# Patient Record
Sex: FEMALE | Race: BLACK OR AFRICAN AMERICAN | NOT HISPANIC OR LATINO | Employment: OTHER | URBAN - METROPOLITAN AREA
[De-identification: names, ages, dates, MRNs, and addresses within clinical notes are randomized per-mention and may not be internally consistent; named-entity substitution may affect disease eponyms.]

---

## 2017-01-11 ENCOUNTER — PATIENT OUTREACH (OUTPATIENT)
Dept: ADMINISTRATIVE | Facility: HOSPITAL | Age: 67
End: 2017-01-11

## 2017-01-11 DIAGNOSIS — Z12.39 SCREENING FOR MALIGNANT NEOPLASM OF BREAST: ICD-10-CM

## 2017-01-11 DIAGNOSIS — Z78.0 POST-MENOPAUSAL: Primary | ICD-10-CM

## 2017-01-11 DIAGNOSIS — Z11.59 NEED FOR HEPATITIS C SCREENING TEST: ICD-10-CM

## 2017-01-11 DIAGNOSIS — M89.9 BONE DISORDER: ICD-10-CM

## 2017-01-11 NOTE — LETTER
January 11, 2017    Kailey Stokes  [address]  [place] [address]             [facility]  [address]  [facility] [address]  Phone: [phone] Dear Ms. Stokes:        [facility] is committed to your overall health.  To help you get the most out of each of your visits, we will review your information to make sure you are up to date on all of your recommended tests and/or procedures.      Karel Flowers MD has found that you may be due for   Health Maintenance Due   Topic    Hepatitis C Screening     Pap Smear     Mammogram     DEXA SCAN     TETANUS VACCINE         If you have had any of the above done at another facility, please bring the records or information with you so that your record at [facility] will be complete.    If you are currently taking medication, please bring it with you to your appointment for review.    We will be happy to assist you with scheduling any necessary appointments or you may contact the [facility] appointment desk at [phone] to schedule at your convenience.     Thank you for choosing [facility] for your healthcare needs,    Clarisa MARY LPN  Care Coordination Department  [facility] [place]

## 2017-01-24 ENCOUNTER — OFFICE VISIT (OUTPATIENT)
Dept: INTERNAL MEDICINE | Facility: CLINIC | Age: 67
End: 2017-01-24
Payer: COMMERCIAL

## 2017-01-24 VITALS
BODY MASS INDEX: 28.11 KG/M2 | WEIGHT: 189.81 LBS | HEIGHT: 69 IN | SYSTOLIC BLOOD PRESSURE: 114 MMHG | TEMPERATURE: 98 F | DIASTOLIC BLOOD PRESSURE: 70 MMHG

## 2017-01-24 DIAGNOSIS — R35.89 POLYURIA: ICD-10-CM

## 2017-01-24 DIAGNOSIS — Z11.59 NEED FOR HEPATITIS C SCREENING TEST: ICD-10-CM

## 2017-01-24 DIAGNOSIS — E78.5 HYPERLIPIDEMIA, UNSPECIFIED HYPERLIPIDEMIA TYPE: Primary | ICD-10-CM

## 2017-01-24 DIAGNOSIS — H93.13 TINNITUS, BILATERAL: ICD-10-CM

## 2017-01-24 DIAGNOSIS — H53.9 VISUAL CHANGES: ICD-10-CM

## 2017-01-24 DIAGNOSIS — I10 ESSENTIAL HYPERTENSION: ICD-10-CM

## 2017-01-24 DIAGNOSIS — R73.09 ABNORMAL GLUCOSE: ICD-10-CM

## 2017-01-24 PROCEDURE — 3078F DIAST BP <80 MM HG: CPT | Mod: S$GLB,,, | Performed by: FAMILY MEDICINE

## 2017-01-24 PROCEDURE — 99214 OFFICE O/P EST MOD 30 MIN: CPT | Mod: S$GLB,,, | Performed by: FAMILY MEDICINE

## 2017-01-24 PROCEDURE — 90471 IMMUNIZATION ADMIN: CPT | Mod: S$GLB,,, | Performed by: FAMILY MEDICINE

## 2017-01-24 PROCEDURE — 99999 PR PBB SHADOW E&M-EST. PATIENT-LVL III: CPT | Mod: PBBFAC,,, | Performed by: FAMILY MEDICINE

## 2017-01-24 PROCEDURE — 90715 TDAP VACCINE 7 YRS/> IM: CPT | Mod: S$GLB,,, | Performed by: FAMILY MEDICINE

## 2017-01-24 PROCEDURE — 1159F MED LIST DOCD IN RCRD: CPT | Mod: S$GLB,,, | Performed by: FAMILY MEDICINE

## 2017-01-24 PROCEDURE — 3074F SYST BP LT 130 MM HG: CPT | Mod: S$GLB,,, | Performed by: FAMILY MEDICINE

## 2017-01-24 RX ORDER — LISINOPRIL 20 MG/1
20 TABLET ORAL
COMMUNITY
Start: 2016-12-09 | End: 2017-07-17 | Stop reason: SDUPTHER

## 2017-01-24 RX ORDER — FLUTICASONE PROPIONATE 50 MCG
2 SPRAY, SUSPENSION (ML) NASAL
COMMUNITY
Start: 2016-06-23 | End: 2017-06-23

## 2017-01-24 RX ORDER — ACETAMINOPHEN 500 MG
1 TABLET ORAL
COMMUNITY
Start: 2016-12-09 | End: 2017-06-22

## 2017-01-24 RX ORDER — PANTOPRAZOLE SODIUM 40 MG/1
40 TABLET, DELAYED RELEASE ORAL
COMMUNITY
Start: 2016-12-09 | End: 2017-07-17 | Stop reason: SDUPTHER

## 2017-01-24 RX ORDER — ESTRADIOL 1 MG/1
TABLET ORAL
COMMUNITY
Start: 2017-01-11 | End: 2017-01-24

## 2017-01-24 NOTE — PROGRESS NOTES
"Subjective:      Patient ID: Kailey Stokes is a 66 y.o. female.    Chief Complaint: Dizziness; Blurred Vision; and Headache    HPI  67 yo female here to re-establish.  Insurance changed and she was going elsewhere for awhile.  Not been seen now for over a year, no labs.  Hx of inability to smell, saw Neuro in past/work up negative.  Reports more recently blurred vision/glare at night with TV.    Notes occ dizziness, increased thirst and urination.  Occ burning in her lower legs, worse at night with sheets touching them.  Strong fam hx of DM.    Been exercising regularly, doing Dominique/spin class.  No CP/SOB while exercising.  Feels better since doing this and has dropped weight.  Has tinnitus, some hearing changes.  Due for Colonoscopy    Past Medical History   Diagnosis Date    Abnormal Pap smear     Anxiety      Dr. Bose    Breast disorder      Pain in left breast    Hyperlipidemia     Hypertension      Family History   Problem Relation Age of Onset    Diabetes Mother     Glaucoma Mother     Hypertension Father     Heart disease Father     Breast cancer Maternal Aunt     Diabetes Sister     Glaucoma Sister     Diabetes Brother      Past Surgical History   Procedure Laterality Date    Knee surgery      Total abdominal hysterectomy w/ bilateral salpingoophorectomy      Hysterectomy       Social History   Substance Use Topics    Smoking status: Never Smoker    Smokeless tobacco: None    Alcohol use Yes      Comment: occ use       Visit Vitals    /70 (BP Location: Right arm, Patient Position: Sitting, BP Method: Manual)    Temp 97.7 °F (36.5 °C) (Tympanic)    Ht 5' 9" (1.753 m)    Wt 86.1 kg (189 lb 13.1 oz)    BMI 28.03 kg/m2       Review of Systems   Constitutional: Negative for activity change, appetite change, chills, diaphoresis, fatigue, fever and unexpected weight change.   HENT: Positive for hearing loss and tinnitus. Negative for ear pain, postnasal drip and rhinorrhea.  "   Eyes: Positive for visual disturbance. Negative for pain.   Respiratory: Negative for cough, shortness of breath and wheezing.    Cardiovascular: Negative for chest pain, palpitations and leg swelling.   Gastrointestinal: Negative for abdominal distention, abdominal pain, constipation and diarrhea.   Endocrine: Positive for polydipsia and polyuria.   Genitourinary: Negative for dysuria, frequency, hematuria and urgency.   Musculoskeletal: Negative for back pain and joint swelling.   Skin: Negative.    Neurological: Positive for dizziness and headaches. Negative for weakness.   Hematological: Negative for adenopathy.   Psychiatric/Behavioral: Negative.      Objective:     Physical Exam   Constitutional: She is oriented to person, place, and time. She appears well-developed and well-nourished. No distress.   HENT:   Right Ear: External ear normal.   Left Ear: External ear normal.   Nose: Nose normal.   Mouth/Throat: Oropharynx is clear and moist.   Eyes: Pupils are equal, round, and reactive to light.   Neck: Normal range of motion. Neck supple. No thyromegaly present.   Cardiovascular: Normal rate, regular rhythm and normal heart sounds.    Pulmonary/Chest: Effort normal and breath sounds normal. No respiratory distress. She has no wheezes. She has no rales.   Abdominal: Soft. Bowel sounds are normal. She exhibits no distension. There is no tenderness.   Musculoskeletal: She exhibits no edema.   Neurological: She is alert and oriented to person, place, and time. No cranial nerve deficit.   Skin: Skin is warm and dry. No rash noted.   Psychiatric: She has a normal mood and affect. Her behavior is normal. Judgment and thought content normal.   Nursing note and vitals reviewed.      Lab Results   Component Value Date    WBC 5.63 01/17/2013    HGB 13.9 01/17/2013    HCT 39.3 01/17/2013     01/17/2013    CHOL 249 (H) 08/15/2013    TRIG 290 (H) 08/15/2013    HDL 44 08/15/2013    ALT 14 08/15/2013    AST 22  08/15/2013     08/15/2013    K 4.3 08/15/2013     08/15/2013    CREATININE 0.9 08/15/2013    BUN 11 08/15/2013    CO2 23 08/15/2013    TSH 1.918 01/17/2013    INR 1.0 02/08/2008    HGBA1C 5.7 10/05/2009       Assessment:     1. Hyperlipidemia, unspecified hyperlipidemia type    2. Polyuria    3. Essential hypertension    4. Abnormal glucose    5. Visual changes    6. Tinnitus, bilateral    7. Need for hepatitis C screening test       Plan:   Hyperlipidemia, unspecified hyperlipidemia type  -     TSH; Future; Expected date: 1/24/17  -     Lipid panel; Future; Expected date: 1/24/17    Polyuria    Essential hypertension  -     CBC auto differential; Future; Expected date: 1/24/17  -     Comprehensive metabolic panel; Future; Expected date: 1/24/17    Abnormal glucose  -     Hemoglobin A1c; Future; Expected date: 1/24/17    Visual changes  -     Ambulatory referral to Optometry    Tinnitus, bilateral  -     Ambulatory consult to Audiology    Need for hepatitis C screening test  -     Hepatitis C antibody; Future; Expected date: 1/24/17    Other orders  -     Tdap Vaccine (Adult)    Given symptoms/strong fam hx of DM need some labs/A1C level.  Monitor for low BP with recent exercise increase.  BP on lower side, may cause some lightheadedness at times.  Needs eye exam and hearing exam  Can see Gyn for well woman  Adacel today  Update labs, on statin.  Check TSH/lipids and CBC/CMP.  F/u to be determined

## 2017-01-24 NOTE — MR AVS SNAPSHOT
[facility]  [address] [place] [address]  Phone: [phone]  Fax: [phone]                  Kailey Stokes   2017 1:40 PM   Office Visit    Description:  Female : 1950   Provider:  Karel Flowers MD   [facility]           Reason for Visit     Dizziness     Blurred Vision     Headache           Diagnoses this Visit        Comments    Visual changes    -  Primary     Essential hypertension         Tinnitus, bilateral         Hyperlipidemia, unspecified hyperlipidemia type         Abnormal glucose         Polyuria         Need for hepatitis C screening test                To Do List           Future Appointments        Provider Department Dept Phone    2017 12:00 PM LABORATORY, [place] [facility] [phone]      Goals (5 Years of Data)     None      [facility]     [facility] On [facility] Nurse Care Line -  Assistance  Registered nurses in the [facility] provide clinical advisement, health education, appointment booking, and other advisory services.  Call for this free service at [phone].             Medications           Message regarding Medications     Verify the changes and/or additions to your medication regime listed below are the same as discussed with your clinician today.  If any of these changes or additions are incorrect, please notify your healthcare provider.        STOP taking these medications     clonazepam (KLONOPIN) 1 MG tablet Take 1 tablet by mouth At bedtime.    estradiol 0.1 mg/24 hr td ptwk 0.1 mg/24 hr PTWK PLACE 1 PATCH ONTO SKIN EVERY 7 DAYS    escitalopram oxalate (LEXAPRO) 10 MG tablet     ipratropium (ATROVENT) 0.03 % nasal spray 2 sprays by Nasal route Three times a day.    estradiol (ESTRACE) 1 MG tablet TAKE 1 TABLET BY MOUTH DAILY           Verify that the below list of medications is an accurate representation of the medications you are  "currently taking.  If none reported, the list may be blank. If incorrect, please contact your healthcare provider. Carry this list with you in case of emergency.           Current Medications     aspirin (ADULT LOW DOSE ASPIRIN) 81 MG EC tablet Take 1 tablet by mouth Daily.    cholecalciferol, vitamin D3, 2,000 unit Cap Take 1 capsule by mouth.    fluticasone (FLONASE) 50 mcg/actuation nasal spray 2 sprays by Nasal route.    lisinopril (PRINIVIL,ZESTRIL) 20 MG tablet Take 20 mg by mouth.    pantoprazole (PROTONIX) 40 MG tablet Take 40 mg by mouth.    pravastatin (PRAVACHOL) 80 MG tablet Take 1 tablet (80 mg total) by mouth once daily.           Clinical Reference Information           Vital Signs - Last Recorded  Most recent update: 1/24/2017  1:09 PM by Henrique Traore LPN    BP Temp Ht Wt BMI    114/70 (BP Location: Right arm, Patient Position: Sitting, BP Method: Manual) 97.7 °F (36.5 °C) (Tympanic) 5' 9" (1.753 m) 86.1 kg (189 lb 13.1 oz) 28.03 kg/m2      Blood Pressure          Most Recent Value    BP  114/70      Allergies as of 1/24/2017     Bromocriptine    Codeine      Immunizations Administered on Date of Encounter - 1/24/2017     Name Date Dose VIS Date Route    TDAP 1/24/2017 0.5 mL 2/24/2015 Intramuscular      Orders Placed During Today's Visit      Normal Orders This Visit    Ambulatory consult to Audiology     Ambulatory referral to Optometry     Tdap Vaccine (Adult)     Future Labs/Procedures Expected by Expires    CBC auto differential  1/24/2017 4/24/2017    Comprehensive metabolic panel  1/24/2017 3/25/2017    Hemoglobin A1c  1/24/2017 4/24/2017    Hepatitis C antibody  1/24/2017 3/25/2018    Lipid panel  1/24/2017 4/24/2017    TSH  1/24/2017 4/24/2017      [facility] Sign-Up     Activating your [facility] account is as easy as 1-2-3!     1) Visit [email or web address], select Sign Up Now, enter this activation code and your date of birth, then select Next.  [email or web address]  Expires: 3/10/2017  1:59 " PM      2) Create a username and password to use when you visit [facility] in the future and select a security question in case you lose your password and select Next.    3) Enter your e-mail address and click Sign Up!    Additional Information  If you have questions, please e-mail [email or web address] or call [phone] to talk to our [facility] staff. Remember, [facility] is NOT to be used for urgent needs. For medical emergencies, dial 911.

## 2017-01-26 ENCOUNTER — LAB VISIT (OUTPATIENT)
Dept: LAB | Facility: HOSPITAL | Age: 67
End: 2017-01-26
Attending: FAMILY MEDICINE
Payer: COMMERCIAL

## 2017-01-26 DIAGNOSIS — R73.09 ABNORMAL GLUCOSE: ICD-10-CM

## 2017-01-26 DIAGNOSIS — I10 ESSENTIAL HYPERTENSION: ICD-10-CM

## 2017-01-26 DIAGNOSIS — E78.5 HYPERLIPIDEMIA, UNSPECIFIED HYPERLIPIDEMIA TYPE: ICD-10-CM

## 2017-01-26 DIAGNOSIS — Z11.59 NEED FOR HEPATITIS C SCREENING TEST: ICD-10-CM

## 2017-01-26 LAB
ALBUMIN SERPL BCP-MCNC: 3.7 G/DL
ALP SERPL-CCNC: 83 U/L
ALT SERPL W/O P-5'-P-CCNC: 5 U/L
ANION GAP SERPL CALC-SCNC: 8 MMOL/L
AST SERPL-CCNC: 14 U/L
BASOPHILS # BLD AUTO: 0.04 K/UL
BASOPHILS NFR BLD: 0.6 %
BILIRUB SERPL-MCNC: 0.6 MG/DL
BUN SERPL-MCNC: 11 MG/DL
CALCIUM SERPL-MCNC: 9.3 MG/DL
CHLORIDE SERPL-SCNC: 104 MMOL/L
CHOLEST/HDLC SERPL: 5.1 {RATIO}
CO2 SERPL-SCNC: 27 MMOL/L
CREAT SERPL-MCNC: 1.1 MG/DL
DIFFERENTIAL METHOD: NORMAL
EOSINOPHIL # BLD AUTO: 0.1 K/UL
EOSINOPHIL NFR BLD: 1.7 %
ERYTHROCYTE [DISTWIDTH] IN BLOOD BY AUTOMATED COUNT: 13.3 %
EST. GFR  (AFRICAN AMERICAN): >60 ML/MIN/1.73 M^2
EST. GFR  (NON AFRICAN AMERICAN): 53.2 ML/MIN/1.73 M^2
GLUCOSE SERPL-MCNC: 82 MG/DL
HCT VFR BLD AUTO: 38 %
HDL/CHOLESTEROL RATIO: 19.8 %
HDLC SERPL-MCNC: 283 MG/DL
HDLC SERPL-MCNC: 56 MG/DL
HGB BLD-MCNC: 13.3 G/DL
LDLC SERPL CALC-MCNC: 178.2 MG/DL
LYMPHOCYTES # BLD AUTO: 2 K/UL
LYMPHOCYTES NFR BLD: 31.7 %
MCH RBC QN AUTO: 28.9 PG
MCHC RBC AUTO-ENTMCNC: 35 %
MCV RBC AUTO: 82 FL
MONOCYTES # BLD AUTO: 0.7 K/UL
MONOCYTES NFR BLD: 11.3 %
NEUTROPHILS # BLD AUTO: 3.5 K/UL
NEUTROPHILS NFR BLD: 54.5 %
NONHDLC SERPL-MCNC: 227 MG/DL
PLATELET # BLD AUTO: 254 K/UL
PMV BLD AUTO: 10 FL
POTASSIUM SERPL-SCNC: 4.3 MMOL/L
PROT SERPL-MCNC: 7 G/DL
RBC # BLD AUTO: 4.61 M/UL
SODIUM SERPL-SCNC: 139 MMOL/L
TRIGL SERPL-MCNC: 244 MG/DL
TSH SERPL DL<=0.005 MIU/L-ACNC: 1.53 UIU/ML
WBC # BLD AUTO: 6.35 K/UL

## 2017-01-26 PROCEDURE — 84443 ASSAY THYROID STIM HORMONE: CPT

## 2017-01-26 PROCEDURE — 80053 COMPREHEN METABOLIC PANEL: CPT

## 2017-01-26 PROCEDURE — 83036 HEMOGLOBIN GLYCOSYLATED A1C: CPT

## 2017-01-26 PROCEDURE — 80061 LIPID PANEL: CPT

## 2017-01-26 PROCEDURE — 86803 HEPATITIS C AB TEST: CPT

## 2017-01-26 PROCEDURE — 36415 COLL VENOUS BLD VENIPUNCTURE: CPT | Mod: PO

## 2017-01-26 PROCEDURE — 85025 COMPLETE CBC W/AUTO DIFF WBC: CPT

## 2017-01-27 LAB
ESTIMATED AVG GLUCOSE: 114 MG/DL
HBA1C MFR BLD HPLC: 5.6 %
HCV AB SERPL QL IA: NEGATIVE

## 2017-01-31 ENCOUNTER — TELEPHONE (OUTPATIENT)
Dept: INTERNAL MEDICINE | Facility: CLINIC | Age: 67
End: 2017-01-31

## 2017-01-31 NOTE — TELEPHONE ENCOUNTER
----- Message from Karel Flowers MD sent at 1/31/2017  9:55 AM CST -----  Labs overall look good except the cholesterol is too high.    Has pt been taking the statin medication every night?

## 2017-01-31 NOTE — TELEPHONE ENCOUNTER
Notified pt of results. She states, she had not been taking her cholesterol medicine, but she will start taking it every day.

## 2017-06-13 ENCOUNTER — PATIENT OUTREACH (OUTPATIENT)
Dept: ADMINISTRATIVE | Facility: HOSPITAL | Age: 67
End: 2017-06-13

## 2017-06-21 ENCOUNTER — TELEPHONE (OUTPATIENT)
Dept: INTERNAL MEDICINE | Facility: CLINIC | Age: 67
End: 2017-06-21

## 2017-06-21 NOTE — TELEPHONE ENCOUNTER
"----- Message from Yoko Eid sent at 6/21/2017 10:36 AM CDT -----  Insurance coverage currently on file is "[employer]"  Verified by phone with company, Has only 2 SICK visits per year, Insurance pays only $75 max per visit. Does not have WELL visit coverage or LAB WORK coverage. I did not ask if any other medical test is covered. I left message on patients cell phone with information. tc  "

## 2017-06-22 ENCOUNTER — HOSPITAL ENCOUNTER (OUTPATIENT)
Dept: RADIOLOGY | Facility: HOSPITAL | Age: 67
Discharge: HOME | End: 2017-06-22
Attending: FAMILY MEDICINE
Payer: MEDICARE

## 2017-06-22 ENCOUNTER — OFFICE VISIT (OUTPATIENT)
Dept: INTERNAL MEDICINE | Facility: CLINIC | Age: 67
End: 2017-06-22
Payer: MEDICARE

## 2017-06-22 VITALS
TEMPERATURE: 97 F | WEIGHT: 194.44 LBS | HEIGHT: 69 IN | DIASTOLIC BLOOD PRESSURE: 70 MMHG | BODY MASS INDEX: 28.8 KG/M2 | HEART RATE: 70 BPM | SYSTOLIC BLOOD PRESSURE: 100 MMHG

## 2017-06-22 DIAGNOSIS — M54.5 CHRONIC LOW BACK PAIN, UNSPECIFIED BACK PAIN LATERALITY, WITH SCIATICA PRESENCE UNSPECIFIED: ICD-10-CM

## 2017-06-22 DIAGNOSIS — G89.29 CHRONIC LOW BACK PAIN, UNSPECIFIED BACK PAIN LATERALITY, WITH SCIATICA PRESENCE UNSPECIFIED: ICD-10-CM

## 2017-06-22 DIAGNOSIS — M79.2 NEURALGIA: ICD-10-CM

## 2017-06-22 DIAGNOSIS — M79.2 NEURALGIA: Primary | ICD-10-CM

## 2017-06-22 PROCEDURE — 99214 OFFICE O/P EST MOD 30 MIN: CPT | Mod: S$PBB,,, | Performed by: FAMILY MEDICINE

## 2017-06-22 PROCEDURE — 72100 X-RAY EXAM L-S SPINE 2/3 VWS: CPT | Mod: TC,PO

## 2017-06-22 PROCEDURE — 99999 PR PBB SHADOW E&M-EST. PATIENT-LVL III: CPT | Mod: PBBFAC,,, | Performed by: FAMILY MEDICINE

## 2017-06-22 PROCEDURE — 72100 X-RAY EXAM L-S SPINE 2/3 VWS: CPT | Mod: 26,,, | Performed by: RADIOLOGY

## 2017-06-22 RX ORDER — ESTRADIOL 1 MG/1
1 TABLET ORAL DAILY
COMMUNITY
End: 2017-07-17 | Stop reason: SDUPTHER

## 2017-06-22 RX ORDER — GABAPENTIN 300 MG/1
300 CAPSULE ORAL NIGHTLY PRN
Qty: 30 CAPSULE | Refills: 1 | Status: SHIPPED | OUTPATIENT
Start: 2017-06-22 | End: 2017-07-17 | Stop reason: SDUPTHER

## 2017-06-22 NOTE — PROGRESS NOTES
"Subjective:      Patient ID: Kailey Stokes is a 66 y.o. female.    Chief Complaint: Leg Pain (burning feeling)    HPI  67 yo female here with c/o burning sensation/feeling in both legs.  Going on for a few mos.  Had labs earlier this year, normal B12 and A1C.  She has had no trauma/injury.  Mainly is noticeable at bedtime.  She admits to having chronic low back/hip pain.  She is active, does mariana and tries to do yoga/pilates.  No skin changes/rash.      Past Medical History:   Diagnosis Date    Abnormal Pap smear     Anxiety     Dr. Bose    Breast disorder     Pain in left breast    Hyperlipidemia     Hypertension      Family History   Problem Relation Age of Onset    Diabetes Mother     Glaucoma Mother     Hypertension Father     Heart disease Father     Breast cancer Maternal Aunt     Diabetes Sister     Glaucoma Sister     Diabetes Brother      Past Surgical History:   Procedure Laterality Date    HYSTERECTOMY      KNEE SURGERY      TOTAL ABDOMINAL HYSTERECTOMY W/ BILATERAL SALPINGOOPHORECTOMY       Social History   Substance Use Topics    Smoking status: Never Smoker    Smokeless tobacco: Not on file    Alcohol use Yes      Comment: occ use       /70 (BP Location: Right arm, Patient Position: Sitting, BP Method: Manual)   Pulse 70   Temp 97.3 °F (36.3 °C) (Tympanic)   Ht 5' 9" (1.753 m)   Wt 88.2 kg (194 lb 7.1 oz)   BMI 28.71 kg/m²     Review of Systems   Constitutional: Negative.    Respiratory: Negative.    Cardiovascular: Negative.    Gastrointestinal: Negative.    Musculoskeletal: Positive for back pain.     Objective:     Physical Exam   Constitutional: She appears well-developed and well-nourished.   Cardiovascular: Normal rate, regular rhythm and normal heart sounds.    Pulmonary/Chest: Breath sounds normal. No respiratory distress.   Abdominal: Soft. Bowel sounds are normal. She exhibits no distension. There is no tenderness.   Musculoskeletal: She exhibits tenderness. " She exhibits no edema.        Lumbar back: She exhibits tenderness. She exhibits normal range of motion.        Back:    Skin: Skin is warm and dry.   Psychiatric: She has a normal mood and affect. Her behavior is normal. Judgment and thought content normal.   Nursing note and vitals reviewed.      Lab Results   Component Value Date    WBC 6.35 01/26/2017    HGB 13.3 01/26/2017    HCT 38.0 01/26/2017     01/26/2017    CHOL 283 (H) 01/26/2017    TRIG 244 (H) 01/26/2017    HDL 56 01/26/2017    ALT 5 (L) 01/26/2017    AST 14 01/26/2017     01/26/2017    K 4.3 01/26/2017     01/26/2017    CREATININE 1.1 01/26/2017    BUN 11 01/26/2017    CO2 27 01/26/2017    TSH 1.527 01/26/2017    INR 1.0 02/08/2008    HGBA1C 5.6 01/26/2017       Assessment:     1. Neuralgia    2. Chronic low back pain, unspecified back pain laterality, with sciatica presence unspecified       Plan:   Neuralgia  -     X-Ray Lumbar Spine Ap And Lateral; Future; Expected date: 06/22/2017    Chronic low back pain, unspecified back pain laterality, with sciatica presence unspecified  -     X-Ray Lumbar Spine Ap And Lateral; Future; Expected date: 06/22/2017    Other orders  -     gabapentin (NEURONTIN) 300 MG capsule; Take 1 capsule (300 mg total) by mouth nightly as needed.  Dispense: 30 capsule; Refill: 1    L spine with some chronic findings  Cont to focus on core strengthening/stretching/low back strengthening  Trial of neurontin nightly  Discussed bowels.  Try probiotics and monitor  F/u 6 wks

## 2017-07-17 DIAGNOSIS — E78.5 OTHER AND UNSPECIFIED HYPERLIPIDEMIA: ICD-10-CM

## 2017-07-17 RX ORDER — PANTOPRAZOLE SODIUM 40 MG/1
40 TABLET, DELAYED RELEASE ORAL DAILY
Qty: 30 TABLET | Refills: 6 | Status: SHIPPED | OUTPATIENT
Start: 2017-07-17 | End: 2018-05-26 | Stop reason: SDUPTHER

## 2017-07-17 RX ORDER — GABAPENTIN 300 MG/1
300 CAPSULE ORAL NIGHTLY PRN
Qty: 30 CAPSULE | Refills: 6 | Status: SHIPPED | OUTPATIENT
Start: 2017-07-17 | End: 2019-05-19 | Stop reason: SDUPTHER

## 2017-07-17 RX ORDER — PRAVASTATIN SODIUM 80 MG/1
80 TABLET ORAL DAILY
Qty: 30 TABLET | Refills: 11 | Status: SHIPPED | OUTPATIENT
Start: 2017-07-17 | End: 2018-07-23 | Stop reason: SDUPTHER

## 2017-07-17 RX ORDER — ESTRADIOL 1 MG/1
1 TABLET ORAL DAILY
Qty: 30 TABLET | Refills: 6 | Status: SHIPPED | OUTPATIENT
Start: 2017-07-17 | End: 2017-08-23

## 2017-07-17 RX ORDER — LISINOPRIL 20 MG/1
20 TABLET ORAL DAILY
Qty: 30 TABLET | Refills: 11 | Status: SHIPPED | OUTPATIENT
Start: 2017-07-17 | End: 2017-08-23

## 2017-07-17 NOTE — TELEPHONE ENCOUNTER
----- Message from Leny Bermeo sent at 7/17/2017  8:12 AM CDT -----  Contact: pt  Please call pt @ [phone] or [phone] regarding blood pressure medication, states pharmacy do not have and she will be leaving out of town this week.

## 2017-08-21 RX ORDER — GABAPENTIN 300 MG/1
CAPSULE ORAL
Qty: 30 CAPSULE | Refills: 1 | Status: SHIPPED | OUTPATIENT
Start: 2017-08-21 | End: 2017-08-23 | Stop reason: SDUPTHER

## 2017-08-23 ENCOUNTER — OFFICE VISIT (OUTPATIENT)
Dept: INTERNAL MEDICINE | Facility: CLINIC | Age: 67
End: 2017-08-23
Payer: MEDICARE

## 2017-08-23 VITALS
HEART RATE: 60 BPM | BODY MASS INDEX: 28.71 KG/M2 | TEMPERATURE: 96 F | SYSTOLIC BLOOD PRESSURE: 94 MMHG | HEIGHT: 69 IN | DIASTOLIC BLOOD PRESSURE: 68 MMHG | WEIGHT: 193.81 LBS

## 2017-08-23 DIAGNOSIS — R07.89 CHEST DISCOMFORT: ICD-10-CM

## 2017-08-23 DIAGNOSIS — M79.2 NEURALGIA: ICD-10-CM

## 2017-08-23 DIAGNOSIS — E78.5 HYPERLIPIDEMIA, UNSPECIFIED HYPERLIPIDEMIA TYPE: Primary | ICD-10-CM

## 2017-08-23 DIAGNOSIS — I10 ESSENTIAL HYPERTENSION: ICD-10-CM

## 2017-08-23 DIAGNOSIS — I95.9 HYPOTENSION, UNSPECIFIED HYPOTENSION TYPE: ICD-10-CM

## 2017-08-23 PROCEDURE — 99999 PR PBB SHADOW E&M-EST. PATIENT-LVL III: CPT | Mod: PBBFAC,,, | Performed by: FAMILY MEDICINE

## 2017-08-23 PROCEDURE — 99213 OFFICE O/P EST LOW 20 MIN: CPT | Mod: PBBFAC,PO | Performed by: FAMILY MEDICINE

## 2017-08-23 PROCEDURE — 3078F DIAST BP <80 MM HG: CPT | Mod: ,,, | Performed by: FAMILY MEDICINE

## 2017-08-23 PROCEDURE — 90670 PCV13 VACCINE IM: CPT | Mod: PBBFAC,PO

## 2017-08-23 PROCEDURE — 3074F SYST BP LT 130 MM HG: CPT | Mod: ,,, | Performed by: FAMILY MEDICINE

## 2017-08-23 PROCEDURE — G0009 ADMIN PNEUMOCOCCAL VACCINE: HCPCS | Mod: PBBFAC,PO

## 2017-08-23 PROCEDURE — 93005 ELECTROCARDIOGRAM TRACING: CPT | Mod: PBBFAC,PO | Performed by: FAMILY MEDICINE

## 2017-08-23 PROCEDURE — 99214 OFFICE O/P EST MOD 30 MIN: CPT | Mod: S$PBB,,, | Performed by: FAMILY MEDICINE

## 2017-08-23 PROCEDURE — 93010 ELECTROCARDIOGRAM REPORT: CPT | Mod: ,,, | Performed by: INTERNAL MEDICINE

## 2017-08-23 RX ORDER — ESTRADIOL 0.5 MG/1
1 TABLET ORAL DAILY
Refills: 11 | COMMUNITY
Start: 2017-07-28 | End: 2018-10-08 | Stop reason: SDUPTHER

## 2017-08-23 NOTE — PROGRESS NOTES
"Subjective:      Patient ID: Kailey Stokes is a 67 y.o. female.    Chief Complaint:  F/U neuralgia/hip/back pain    HPI  68 yo female here today for f/u.  Since starting the neurontin, she has seen great improvement in the back/hip pain and burning sensation in the leg.  Tolerating med just fine.  BP has been running on low side.  She will occ feel lightheaded/pre-syncopal.  Occ palpitations/chest pressure.   Is getting back to her exercise, took off some during summer while caring for her grandson.  Took her BP med last night.  No problems with CP/SOB on exertion.    Past Medical History:   Diagnosis Date    Abnormal Pap smear     Anxiety     Dr. Bose    Breast disorder     Pain in left breast    Hyperlipidemia     Hypertension      Family History   Problem Relation Age of Onset    Diabetes Mother     Glaucoma Mother     Hypertension Father     Heart disease Father     Breast cancer Maternal Aunt     Diabetes Sister     Glaucoma Sister     Diabetes Brother      Past Surgical History:   Procedure Laterality Date    HYSTERECTOMY      KNEE SURGERY      TOTAL ABDOMINAL HYSTERECTOMY W/ BILATERAL SALPINGOOPHORECTOMY       Social History   Substance Use Topics    Smoking status: Never Smoker    Smokeless tobacco: Never Used    Alcohol use Yes      Comment: occ use       BP (!) 82/58   Pulse 60   Temp 96.4 °F (35.8 °C) (Tympanic)   Ht 5' 8.5" (1.74 m)   Wt 87.9 kg (193 lb 12.6 oz)   BMI 29.04 kg/m²     Review of Systems   Constitutional: Negative.    Respiratory: Negative.    Cardiovascular: Negative.    Gastrointestinal: Negative.      Objective:     Physical Exam   Constitutional: She is oriented to person, place, and time. She appears well-developed and well-nourished.   Eyes: Pupils are equal, round, and reactive to light.   Neck: Normal range of motion. Neck supple.   Cardiovascular: Normal rate, regular rhythm and normal heart sounds.    Pulmonary/Chest: Effort normal and breath sounds " normal. No respiratory distress. She has no wheezes.   Abdominal: Soft. Bowel sounds are normal. She exhibits no distension.   Neurological: She is alert and oriented to person, place, and time.   Skin: Skin is warm and dry.   Nursing note and vitals reviewed.      Lab Results   Component Value Date    WBC 6.35 01/26/2017    HGB 13.3 01/26/2017    HCT 38.0 01/26/2017     01/26/2017    CHOL 283 (H) 01/26/2017    TRIG 244 (H) 01/26/2017    HDL 56 01/26/2017    ALT 5 (L) 01/26/2017    AST 14 01/26/2017     01/26/2017    K 4.3 01/26/2017     01/26/2017    CREATININE 1.1 01/26/2017    BUN 11 01/26/2017    CO2 27 01/26/2017    TSH 1.527 01/26/2017    INR 1.0 02/08/2008    HGBA1C 5.6 01/26/2017       Assessment:     1. Hyperlipidemia, unspecified hyperlipidemia type    2. Essential hypertension    3. Neuralgia    4. Hypotension, unspecified hypotension type    5. Chest discomfort       Plan:   Hyperlipidemia, unspecified hyperlipidemia type    Essential hypertension    Neuralgia    Hypotension, unspecified hypotension type    Chest discomfort  -     IN OFFICE EKG 12-LEAD (to [place])    Other orders  -     (In Office Administered) Pneumococcal Conjugate Vaccine (13 Valent) (IM)    EKG ok  BP low, hold med.  Monitor BP at home, report readings to MD in 10-14 days.  Neuralgia/hip/back pain improving, cont the Neurontin.  Drink plenty of water  Caution with exercise today with low BP  F/u 3-4 mos

## 2017-09-11 ENCOUNTER — TELEPHONE (OUTPATIENT)
Dept: INTERNAL MEDICINE | Facility: CLINIC | Age: 67
End: 2017-09-11

## 2017-12-28 ENCOUNTER — OFFICE VISIT (OUTPATIENT)
Dept: URGENT CARE | Facility: CLINIC | Age: 67
End: 2017-12-28
Payer: MEDICARE

## 2017-12-28 VITALS
HEART RATE: 91 BPM | DIASTOLIC BLOOD PRESSURE: 80 MMHG | SYSTOLIC BLOOD PRESSURE: 130 MMHG | WEIGHT: 201.25 LBS | BODY MASS INDEX: 30.5 KG/M2 | TEMPERATURE: 98 F | HEIGHT: 68 IN

## 2017-12-28 DIAGNOSIS — J11.1 INFLUENZA-LIKE ILLNESS: Primary | ICD-10-CM

## 2017-12-28 PROCEDURE — 99213 OFFICE O/P EST LOW 20 MIN: CPT | Mod: PBBFAC,PO | Performed by: PHYSICIAN ASSISTANT

## 2017-12-28 PROCEDURE — 99214 OFFICE O/P EST MOD 30 MIN: CPT | Mod: S$PBB,,, | Performed by: PHYSICIAN ASSISTANT

## 2017-12-28 PROCEDURE — 99999 PR PBB SHADOW E&M-EST. PATIENT-LVL III: CPT | Mod: PBBFAC,,, | Performed by: PHYSICIAN ASSISTANT

## 2017-12-28 RX ORDER — OSELTAMIVIR PHOSPHATE 75 MG/1
75 CAPSULE ORAL 2 TIMES DAILY
Qty: 10 CAPSULE | Refills: 0 | Status: SHIPPED | OUTPATIENT
Start: 2017-12-28 | End: 2018-01-02

## 2017-12-28 NOTE — PATIENT INSTRUCTIONS
Influenza (Adult)    Influenza is also called the flu. It is a viral illness that affects the air passages of your lungs. It is different from the common cold. The flu can easily be passed from one to person to another. It may be spread through the air by coughing and sneezing. Or it can be spread by touching the sick person and then touching your own eyes, nose, or mouth.  The flu starts 1 to 3 days after you are exposed to the flu virus. It may last for 1 to 2 weeks but many people feel tired or fatigued for many weeks afterward. You usually dont need to take antibiotics unless you have a complication. This might be an ear or sinus infection or pneumonia.  Symptoms of the flu may be mild or severe. They can include extreme tiredness (wanting to stay in bed all day), chills, fevers, muscle aches, soreness with eye movement, headache, and a dry, hacking cough.  Home care  Follow these guidelines when caring for yourself at home:  · Avoid being around cigarette smoke, whether yours or other peoples.  · Acetaminophen or ibuprofen will help ease your fever, muscle aches, and headache. Dont give aspirin to anyone younger than 18 who has the flu. Aspirin can harm the liver.  · Nausea and loss of appetite are common with the flu. Eat light meals. Drink 6 to 8 glasses of liquids every day. Good choices are water, sport drinks, soft drinks without caffeine, juices, tea, and soup. Extra fluids will also help loosen secretions in your nose and lungs.  · Over-the-counter cold medicines will not make the flu go away faster. But the medicines may help with coughing, sore throat, and congestion in your nose and sinuses. Dont use a decongestant if you have high blood pressure.  · Stay home until your fever has been gone for at least 24 hours without using medicine to reduce fever.  Follow-up care  Follow up with your healthcare provider, or as advised, if you are not getting better over the next week.  If you are age 65 or  older, talk with your provider about getting a pneumococcal vaccine every 5 years. You should also get this vaccine if you have chronic asthma or COPD. All adults should get a flu vaccine every fall. Ask your provider about this.  When to seek medical advice  Call your healthcare provider right away if any of these occur:  · Cough with lots of colored mucus (sputum) or blood in your mucus  · Chest pain, shortness of breath, wheezing, or trouble breathing  · Severe headache, or face, neck, or ear pain  · New rash with fever  · Fever of 100.4°F (38°C) or higher, or as directed by your healthcare provider  · Confusion, behavior change, or seizure  · Severe weakness or dizziness  · You get a new fever or cough after getting better for a few days  Date Last Reviewed: 1/1/2017  © 5785-4252 [employer]. [address] [place] [address], [place] [address]. All rights reserved. This information is not intended as a substitute for professional medical care. Always follow your healthcare professional's instructions.      Complete Tamiflu  Rest  Drink plenty of clear fluids--at least 64 ounces of water/juice  Normal saline nasal wash (example Ocean spray) to irrigate sinuses and for congestion/runny nose  Flonase or Nasonex to decrease inflammation  Tylenol for fever, headache and body aches  Mucinex DM to help thin secretions and reduce congestion  Cool mist humidifier/vaporizer  Warm salt water gargles for throat comfort  Chloraseptic spray or lozenges for throat comfort  Warm tea with honey  Practice good handwashing      Please see your PCP or go to ER if symptoms worsen, you have fever, or begin to have shortness of breath.       Tinnitus (Ringing in the Ears)     Treatment may include maskers and hearing aids.     Tinnitus is the term for a noise in your ear not caused by an outside sound. The noise might be a ringing, clicking, hiss, or roar. It can vary in pitch and may be soft or quite loud. For some people,  tinnitus is a minor nuisance. But for others, the noise can make it hard to hear, work, and even sleep. When tinnitus can't be cured, a number of treatments may offer relief.  What causes tinnitus?  Loud noises, hearing loss, and ear wax can cause tinnitus. So can certain medicines. Large amounts of aspirin or caffeine are sometimes to blame. In many cases, the exact cause of tinnitus is unknown.  How is tinnitus treated?  Identifying and removing the cause is the best way to treat tinnitus. For that reason, your healthcare provider may refer you to an otolaryngologist (ear, nose, and throat doctor). Your hearing may also be checked by an audiologist (hearing specialist). If you have hearing loss, wearing a hearing aid may help your tinnitus. When the cause can't be found, the tinnitus itself may be treated. Some of the treatments are listed below, and your healthcare provider can tell you more about them:  · Maskers are small devices that look like hearing aids. They emit a pleasant sound that helps cover up the ringing in your ears. Hearing aids and maskers are sometimes used together.  · Medicines that treat anxiety and depression may ease tinnitus in some people.  · Hypnosis or relaxation therapy may help head noise seem less severe.  · Tinnitus retraining therapy combines counseling and maskers. Both can help take your mind off the tinnitus.  For more information  · American Speech-Hearing-Language Association 585-132-9629 www.jose.org  · American Tinnitus Association 436-531-2495 www.violette.org  · National [place] on Deafness and other Communication Disorders 311-066-7030 www.nidcd.nih.gov   Date Last Reviewed: 7/1/2016 © 2000-2017 [employer]. [address] [place] [address], [place] [address]. All rights reserved. This information is not intended as a substitute for professional medical care. Always follow your healthcare professional's instructions.

## 2017-12-28 NOTE — PROGRESS NOTES
"Subjective:       Patient ID: Kailey Stokes is a 67 y.o. female.    Chief Complaint: Sinus Problem    Sinus Problem   This is a new problem. The current episode started in the past 7 days (2 days). The problem is unchanged. There has been no fever. Associated symptoms include chills, congestion, headaches and a sore throat (scratchy throat). Pertinent negatives include no coughing, ear pain, shortness of breath, sinus pressure or sneezing. Past treatments include nothing.     Review of Systems   Constitutional: Positive for chills and fatigue. Negative for fever.   HENT: Positive for congestion, rhinorrhea and sore throat (scratchy throat). Negative for ear discharge, ear pain, postnasal drip, sinus pressure and sneezing.    Eyes: Negative for pain and discharge.   Respiratory: Negative for cough, shortness of breath and wheezing.    Cardiovascular: Negative for chest pain and leg swelling.   Gastrointestinal: Negative for abdominal pain, nausea and vomiting.   Musculoskeletal: Negative for myalgias.   Skin: Negative for rash.   Neurological: Positive for headaches.       Objective:      /80 (BP Location: Right arm, Patient Position: Sitting, BP Method: Large (Manual))   Pulse 91   Temp 97.5 °F (36.4 °C) (Tympanic)   Ht 5' 8" (1.727 m)   Wt 91.3 kg (201 lb 4.5 oz)   BMI 30.60 kg/m²   Physical Exam   Constitutional: She is oriented to person, place, and time. She appears well-developed and well-nourished. No distress.   HENT:   Head: Normocephalic and atraumatic.   Right Ear: Tympanic membrane, external ear and ear canal normal.   Left Ear: Tympanic membrane, external ear and ear canal normal.   Nose: Nose normal. Right sinus exhibits no maxillary sinus tenderness and no frontal sinus tenderness. Left sinus exhibits no maxillary sinus tenderness and no frontal sinus tenderness.   Mouth/Throat: Oropharynx is clear and moist. No oropharyngeal exudate or posterior oropharyngeal erythema. No tonsillar " exudate.   Eyes: Conjunctivae and EOM are normal. Pupils are equal, round, and reactive to light. Right eye exhibits no discharge. Left eye exhibits no discharge.   Neck: Normal range of motion. Neck supple.   Cardiovascular: Normal rate, regular rhythm, normal heart sounds and intact distal pulses.  Exam reveals no gallop and no friction rub.    No murmur heard.  Pulmonary/Chest: Effort normal and breath sounds normal. No stridor. No respiratory distress. She has no wheezes. She has no rales. She exhibits no tenderness.   Lymphadenopathy:     She has no cervical adenopathy.   Neurological: She is alert and oriented to person, place, and time. Coordination normal.   Skin: Skin is warm and dry. No rash noted. She is not diaphoretic. No erythema. No pallor.   Nursing note and vitals reviewed.      Assessment:       1. Influenza-like illness        Plan:       Influenza-like illness  -     oseltamivir (TAMIFLU) 75 MG capsule; Take 1 capsule (75 mg total) by mouth 2 (two) times daily.  Dispense: 10 capsule; Refill: 0      Patient declines flu testing, her  is in clinic today with influenza like illness, high suspicion so will start Tamiflu.  Complete Tamiflu  Rest  Drink plenty of clear fluids--at least 64 ounces of water/juice  Normal saline nasal wash (example Ocean spray) to irrigate sinuses and for congestion/runny nose  Flonase or Nasonex to decrease inflammation  Tylenol for fever, headache and body aches  Mucinex DM to help thin secretions and reduce congestion  Cool mist humidifier/vaporizer  Warm salt water gargles for throat comfort  Chloraseptic spray or lozenges for throat comfort  Warm tea with honey  Practice good handwashing      Please see your PCP or go to ER if symptoms worsen, you have fever, or begin to have shortness of breath.       Heather Trant PA-C [facility]

## 2018-03-05 ENCOUNTER — OFFICE VISIT (OUTPATIENT)
Dept: INTERNAL MEDICINE | Facility: CLINIC | Age: 68
End: 2018-03-05
Payer: MEDICARE

## 2018-03-05 VITALS
DIASTOLIC BLOOD PRESSURE: 80 MMHG | SYSTOLIC BLOOD PRESSURE: 126 MMHG | BODY MASS INDEX: 30.14 KG/M2 | WEIGHT: 203.5 LBS | HEART RATE: 74 BPM | HEIGHT: 69 IN | TEMPERATURE: 98 F

## 2018-03-05 DIAGNOSIS — E78.5 HYPERLIPIDEMIA, UNSPECIFIED HYPERLIPIDEMIA TYPE: ICD-10-CM

## 2018-03-05 DIAGNOSIS — J02.9 SORE THROAT: Primary | ICD-10-CM

## 2018-03-05 DIAGNOSIS — I10 ESSENTIAL HYPERTENSION: ICD-10-CM

## 2018-03-05 LAB
CTP QC/QA: YES
S PYO RRNA THROAT QL PROBE: NEGATIVE

## 2018-03-05 PROCEDURE — 87880 STREP A ASSAY W/OPTIC: CPT | Mod: QW,S$GLB,, | Performed by: FAMILY MEDICINE

## 2018-03-05 PROCEDURE — 99999 PR PBB SHADOW E&M-EST. PATIENT-LVL III: CPT | Mod: PBBFAC,,, | Performed by: FAMILY MEDICINE

## 2018-03-05 PROCEDURE — 3074F SYST BP LT 130 MM HG: CPT | Mod: S$GLB,,, | Performed by: FAMILY MEDICINE

## 2018-03-05 PROCEDURE — 96372 THER/PROPH/DIAG INJ SC/IM: CPT | Mod: S$GLB,,, | Performed by: FAMILY MEDICINE

## 2018-03-05 PROCEDURE — 3079F DIAST BP 80-89 MM HG: CPT | Mod: S$GLB,,, | Performed by: FAMILY MEDICINE

## 2018-03-05 PROCEDURE — 99213 OFFICE O/P EST LOW 20 MIN: CPT | Mod: 25,S$GLB,, | Performed by: FAMILY MEDICINE

## 2018-03-05 PROCEDURE — 87081 CULTURE SCREEN ONLY: CPT

## 2018-03-05 RX ORDER — METHYLPREDNISOLONE ACETATE 80 MG/ML
80 INJECTION, SUSPENSION INTRA-ARTICULAR; INTRALESIONAL; INTRAMUSCULAR; SOFT TISSUE
Status: COMPLETED | OUTPATIENT
Start: 2018-03-05 | End: 2018-03-05

## 2018-03-05 RX ADMIN — METHYLPREDNISOLONE ACETATE 80 MG: 80 INJECTION, SUSPENSION INTRA-ARTICULAR; INTRALESIONAL; INTRAMUSCULAR; SOFT TISSUE at 11:03

## 2018-03-05 NOTE — PROGRESS NOTES
"Subjective:      Patient ID: Kailey Stokes is a 67 y.o. female.    Chief Complaint: Sore Throat    HPI  66 yo female here today with c/o sore throat off and on for several days, possibly more than week.  No fever/chills.  Some PND/throat clearing.  No ear pain, no sinus pressure.  Took Keflex, some of her sisters Abx a few doses over weekend along with Aleve and Theraflu.  Today, feeling a little better.  Still sore with swallowing food/liquid.    Past Medical History:   Diagnosis Date    Abnormal Pap smear     Anxiety     Dr. Bose    Breast disorder     Pain in left breast    Hyperlipidemia     Hypertension      Family History   Problem Relation Age of Onset    Diabetes Mother     Glaucoma Mother     Hypertension Father     Heart disease Father     Breast cancer Maternal Aunt     Diabetes Sister     Glaucoma Sister     Diabetes Brother      Past Surgical History:   Procedure Laterality Date    HYSTERECTOMY      KNEE SURGERY      TOTAL ABDOMINAL HYSTERECTOMY W/ BILATERAL SALPINGOOPHORECTOMY       Social History   Substance Use Topics    Smoking status: Never Smoker    Smokeless tobacco: Never Used    Alcohol use Yes      Comment: occ use       /80   Pulse 74   Temp 97.8 °F (36.6 °C) (Tympanic)   Ht 5' 8.5" (1.74 m)   Wt 92.3 kg (203 lb 7.8 oz)   BMI 30.49 kg/m²     Review of Systems   Constitutional: Negative for chills and fever.   HENT: Positive for sore throat.    Respiratory: Negative for cough.        Objective:     Physical Exam   Constitutional: She appears well-developed and well-nourished.   HENT:   Right Ear: External ear normal.   Left Ear: External ear normal.   Mouth/Throat: Uvula is midline and mucous membranes are normal. Posterior oropharyngeal erythema present. No oropharyngeal exudate. No tonsillar exudate.   Cardiovascular: Normal rate, regular rhythm and normal heart sounds.    Pulmonary/Chest: Effort normal and breath sounds normal. No respiratory distress. She " has no wheezes.   Nursing note and vitals reviewed.      Lab Results   Component Value Date    WBC 6.35 01/26/2017    HGB 13.3 01/26/2017    HCT 38.0 01/26/2017     01/26/2017    CHOL 283 (H) 01/26/2017    TRIG 244 (H) 01/26/2017    HDL 56 01/26/2017    ALT 5 (L) 01/26/2017    AST 14 01/26/2017     01/26/2017    K 4.3 01/26/2017     01/26/2017    CREATININE 1.1 01/26/2017    BUN 11 01/26/2017    CO2 27 01/26/2017    TSH 1.527 01/26/2017    INR 1.0 02/08/2008    HGBA1C 5.6 01/26/2017       Assessment:     1. Sore throat    2. Essential hypertension    3. Hyperlipidemia, unspecified hyperlipidemia type         Plan:     Sore throat  -     POCT Rapid Strep A  -     Strep A culture, throat    Essential hypertension  -     CBC auto differential; Future; Expected date: 06/05/2018  -     Comprehensive metabolic panel; Future; Expected date: 06/05/2018    Hyperlipidemia, unspecified hyperlipidemia type  -     Lipid panel; Future; Expected date: 06/05/2018  -     TSH; Future; Expected date: 06/05/2018    Other orders  -     methylPREDNISolone acetate injection 80 mg; Inject 1 mL (80 mg total) into the muscle one time.    Rapid strep neg  Cx pending  Depo medrol 80mg IM for pain/swelling  Warm salt water gargles  F/u 3 mos with labs prior

## 2018-03-07 LAB — BACTERIA THROAT CULT: NORMAL

## 2018-05-28 ENCOUNTER — OFFICE VISIT (OUTPATIENT)
Dept: INTERNAL MEDICINE | Facility: CLINIC | Age: 68
End: 2018-05-28
Payer: MEDICARE

## 2018-05-28 VITALS
HEART RATE: 84 BPM | SYSTOLIC BLOOD PRESSURE: 128 MMHG | HEIGHT: 69 IN | TEMPERATURE: 97 F | BODY MASS INDEX: 29.71 KG/M2 | WEIGHT: 200.63 LBS | DIASTOLIC BLOOD PRESSURE: 80 MMHG

## 2018-05-28 DIAGNOSIS — M47.22 OSTEOARTHRITIS OF SPINE WITH RADICULOPATHY, CERVICAL REGION: Primary | ICD-10-CM

## 2018-05-28 DIAGNOSIS — M50.20 CERVICAL DISC HERNIATION: ICD-10-CM

## 2018-05-28 PROCEDURE — 99213 OFFICE O/P EST LOW 20 MIN: CPT | Mod: S$GLB,,, | Performed by: PHYSICIAN ASSISTANT

## 2018-05-28 PROCEDURE — 3079F DIAST BP 80-89 MM HG: CPT | Mod: CPTII,S$GLB,, | Performed by: PHYSICIAN ASSISTANT

## 2018-05-28 PROCEDURE — 3074F SYST BP LT 130 MM HG: CPT | Mod: CPTII,S$GLB,, | Performed by: PHYSICIAN ASSISTANT

## 2018-05-28 PROCEDURE — 99999 PR PBB SHADOW E&M-EST. PATIENT-LVL III: CPT | Mod: PBBFAC,,, | Performed by: PHYSICIAN ASSISTANT

## 2018-05-28 PROCEDURE — 3008F BODY MASS INDEX DOCD: CPT | Mod: CPTII,S$GLB,, | Performed by: PHYSICIAN ASSISTANT

## 2018-05-28 RX ORDER — PANTOPRAZOLE SODIUM 40 MG/1
TABLET, DELAYED RELEASE ORAL
Qty: 30 TABLET | Refills: 3 | Status: SHIPPED | OUTPATIENT
Start: 2018-05-28 | End: 2018-08-15 | Stop reason: SDUPTHER

## 2018-05-28 RX ORDER — MELOXICAM 7.5 MG/1
7.5 TABLET ORAL DAILY
Qty: 30 TABLET | Refills: 0 | Status: SHIPPED | OUTPATIENT
Start: 2018-05-28 | End: 2018-06-21

## 2018-05-28 RX ORDER — TIZANIDINE 4 MG/1
4 TABLET ORAL EVERY 8 HOURS PRN
Qty: 30 TABLET | Refills: 0 | Status: SHIPPED | OUTPATIENT
Start: 2018-05-28 | End: 2018-06-07

## 2018-05-28 NOTE — PROGRESS NOTES
Subjective:       Patient ID: Kailey Stokes is a 67 y.o. female.    Chief Complaint: Neck Pain and Shoulder Pain   Patient comes in today for acute on chronic neck and shoulder pain.  She has been having on and off neck pain for several years actually did have an MRI in the area of in 2007 which did show disc herniation.  She states over the last 2 days her neck muscles have been tight and she is having trouble moving her neck and sleeping.  It has gradually improved with heat and rest.  He is not taking any anti-inflammatories.  She has not seen provider for this in awhile she also some lower back pains that she did see PCP for he does have lumbar degeneration.  She not denies any upper respiratory symptoms, no fever no headaches.  Pain does not radiate.  Full range of motion of upper extremities.  No numbness no tingling.  No sore throat or difficulty swallowing.  Neck Pain    This is a new problem. The current episode started in the past 7 days. Pertinent negatives include no chest pain, fever, photophobia, trouble swallowing or weakness.   Shoulder Pain    Pertinent negatives include no fever.       Health Maintenance Due   Topic Date Due    Lipid Panel  01/26/2018    Mammogram  07/03/2018       Past Medical History:   Diagnosis Date    Abnormal Pap smear     Anxiety     Dr. Bose    Breast disorder     Pain in left breast    Hyperlipidemia     Hypertension        Current Outpatient Prescriptions   Medication Sig Dispense Refill    estradiol (ESTRACE) 0.5 MG tablet Take 1 tablet by mouth once daily.  11    gabapentin (NEURONTIN) 300 MG capsule Take 1 capsule (300 mg total) by mouth nightly as needed. 30 capsule 6    pantoprazole (PROTONIX) 40 MG tablet TAKE 1 TABLET(40 MG) BY MOUTH EVERY DAY 30 tablet 3    pravastatin (PRAVACHOL) 80 MG tablet Take 1 tablet (80 mg total) by mouth once daily. 30 tablet 11    aspirin (ADULT LOW DOSE ASPIRIN) 81 MG EC tablet Take 1 tablet by mouth Daily.       "meloxicam (MOBIC) 7.5 MG tablet Take 1 tablet (7.5 mg total) by mouth once daily. 30 tablet 0    tiZANidine (ZANAFLEX) 4 MG tablet Take 1 tablet (4 mg total) by mouth every 8 (eight) hours as needed. 30 tablet 0     No current facility-administered medications for this visit.        Review of Systems   Constitutional: Negative for activity change, appetite change, chills, fever and unexpected weight change.   HENT: Negative for trouble swallowing and voice change.    Eyes: Negative for photophobia and visual disturbance.   Respiratory: Negative for apnea and choking.    Cardiovascular: Negative for chest pain, palpitations and leg swelling.   Gastrointestinal: Negative for abdominal distention, abdominal pain and anal bleeding.   Endocrine: Negative for cold intolerance and heat intolerance.   Genitourinary: Negative for difficulty urinating, dyspareunia, menstrual problem and pelvic pain.   Musculoskeletal: Positive for arthralgias, myalgias and neck pain. Negative for back pain.   Skin: Negative for rash and wound.   Allergic/Immunologic: Negative for immunocompromised state.   Neurological: Negative for dizziness, syncope and weakness.   Hematological: Negative for adenopathy. Does not bruise/bleed easily.   Psychiatric/Behavioral: Negative for sleep disturbance and suicidal ideas.       Objective:   /80   Pulse 84   Temp 96.7 °F (35.9 °C) (Tympanic)   Ht 5' 8.5" (1.74 m)   Wt 91 kg (200 lb 9.9 oz)   BMI 30.06 kg/m²      Physical Exam   Constitutional: She is oriented to person, place, and time. She appears well-developed and well-nourished. No distress.   HENT:   Head: Normocephalic and atraumatic.   Right Ear: External ear normal.   Mouth/Throat: Oropharynx is clear and moist.   Eyes: Conjunctivae and EOM are normal. Pupils are equal, round, and reactive to light.   Neck:       Cardiovascular: Normal rate, regular rhythm, normal heart sounds and intact distal pulses.  Exam reveals no gallop and no " friction rub.    No murmur heard.  Musculoskeletal: Normal range of motion.   Neurological: She is alert and oriented to person, place, and time.   Psychiatric: She has a normal mood and affect. Her behavior is normal. Judgment and thought content normal.         Lab Results   Component Value Date    WBC 6.35 01/26/2017    HGB 13.3 01/26/2017    HCT 38.0 01/26/2017     01/26/2017    CHOL 283 (H) 01/26/2017    TRIG 244 (H) 01/26/2017    HDL 56 01/26/2017    ALT 5 (L) 01/26/2017    AST 14 01/26/2017     01/26/2017    K 4.3 01/26/2017     01/26/2017    CREATININE 1.1 01/26/2017    BUN 11 01/26/2017    CO2 27 01/26/2017    TSH 1.527 01/26/2017    INR 1.0 02/08/2008    HGBA1C 5.6 01/26/2017       Assessment:       1. Osteoarthritis of spine with radiculopathy, cervical region    2. Cervical disc herniation        Plan:   Osteoarthritis of spine with radiculopathy, cervical region  -     Ambulatory referral to Spine Surgery    Cervical disc herniation  -     Ambulatory referral to Spine Surgery    Other orders  -     meloxicam (MOBIC) 7.5 MG tablet; Take 1 tablet (7.5 mg total) by mouth once daily.  Dispense: 30 tablet; Refill: 0  -     tiZANidine (ZANAFLEX) 4 MG tablet; Take 1 tablet (4 mg total) by mouth every 8 (eight) hours as needed.  Dispense: 30 tablet; Refill: 0    Start NSAID  Given known DDD and herniation, referral to spine as well for recommendations     No Follow-up on file.

## 2018-05-29 ENCOUNTER — TELEPHONE (OUTPATIENT)
Dept: INTERNAL MEDICINE | Facility: CLINIC | Age: 68
End: 2018-05-29

## 2018-06-12 ENCOUNTER — LAB VISIT (OUTPATIENT)
Dept: LAB | Facility: HOSPITAL | Age: 68
End: 2018-06-12
Attending: FAMILY MEDICINE
Payer: MEDICARE

## 2018-06-12 DIAGNOSIS — E78.5 HYPERLIPIDEMIA, UNSPECIFIED HYPERLIPIDEMIA TYPE: ICD-10-CM

## 2018-06-12 DIAGNOSIS — I10 ESSENTIAL HYPERTENSION: ICD-10-CM

## 2018-06-12 LAB
ALBUMIN SERPL BCP-MCNC: 4 G/DL
ALP SERPL-CCNC: 90 U/L
ALT SERPL W/O P-5'-P-CCNC: 11 U/L
ANION GAP SERPL CALC-SCNC: 9 MMOL/L
AST SERPL-CCNC: 19 U/L
BASOPHILS # BLD AUTO: 0.05 K/UL
BASOPHILS NFR BLD: 0.8 %
BILIRUB SERPL-MCNC: 0.6 MG/DL
BUN SERPL-MCNC: 11 MG/DL
CALCIUM SERPL-MCNC: 8.9 MG/DL
CHLORIDE SERPL-SCNC: 102 MMOL/L
CHOLEST SERPL-MCNC: 243 MG/DL
CHOLEST/HDLC SERPL: 4.7 {RATIO}
CO2 SERPL-SCNC: 28 MMOL/L
CREAT SERPL-MCNC: 1 MG/DL
DIFFERENTIAL METHOD: NORMAL
EOSINOPHIL # BLD AUTO: 0.1 K/UL
EOSINOPHIL NFR BLD: 2.3 %
ERYTHROCYTE [DISTWIDTH] IN BLOOD BY AUTOMATED COUNT: 13.8 %
EST. GFR  (AFRICAN AMERICAN): >60 ML/MIN/1.73 M^2
EST. GFR  (NON AFRICAN AMERICAN): 59.3 ML/MIN/1.73 M^2
GLUCOSE SERPL-MCNC: 94 MG/DL
HCT VFR BLD AUTO: 40.5 %
HDLC SERPL-MCNC: 52 MG/DL
HDLC SERPL: 21.4 %
HGB BLD-MCNC: 13.9 G/DL
IMM GRANULOCYTES # BLD AUTO: 0.02 K/UL
IMM GRANULOCYTES NFR BLD AUTO: 0.3 %
LDLC SERPL CALC-MCNC: 144.2 MG/DL
LYMPHOCYTES # BLD AUTO: 2.4 K/UL
LYMPHOCYTES NFR BLD: 38.6 %
MCH RBC QN AUTO: 29.2 PG
MCHC RBC AUTO-ENTMCNC: 34.3 G/DL
MCV RBC AUTO: 85 FL
MONOCYTES # BLD AUTO: 0.5 K/UL
MONOCYTES NFR BLD: 8.3 %
NEUTROPHILS # BLD AUTO: 3 K/UL
NEUTROPHILS NFR BLD: 49.7 %
NONHDLC SERPL-MCNC: 191 MG/DL
NRBC BLD-RTO: 0 /100 WBC
PLATELET # BLD AUTO: 297 K/UL
PMV BLD AUTO: 9.9 FL
POTASSIUM SERPL-SCNC: 4.5 MMOL/L
PROT SERPL-MCNC: 7.4 G/DL
RBC # BLD AUTO: 4.76 M/UL
SODIUM SERPL-SCNC: 139 MMOL/L
TRIGL SERPL-MCNC: 234 MG/DL
TSH SERPL DL<=0.005 MIU/L-ACNC: 1.74 UIU/ML
WBC # BLD AUTO: 6.11 K/UL

## 2018-06-12 PROCEDURE — 85025 COMPLETE CBC W/AUTO DIFF WBC: CPT

## 2018-06-12 PROCEDURE — 36415 COLL VENOUS BLD VENIPUNCTURE: CPT | Mod: PO

## 2018-06-12 PROCEDURE — 84443 ASSAY THYROID STIM HORMONE: CPT

## 2018-06-12 PROCEDURE — 80053 COMPREHEN METABOLIC PANEL: CPT

## 2018-06-12 PROCEDURE — 80061 LIPID PANEL: CPT

## 2018-06-21 ENCOUNTER — OFFICE VISIT (OUTPATIENT)
Dept: INTERNAL MEDICINE | Facility: CLINIC | Age: 68
End: 2018-06-21
Payer: MEDICARE

## 2018-06-21 VITALS
DIASTOLIC BLOOD PRESSURE: 74 MMHG | TEMPERATURE: 98 F | WEIGHT: 206.56 LBS | HEIGHT: 69 IN | HEART RATE: 82 BPM | SYSTOLIC BLOOD PRESSURE: 130 MMHG | BODY MASS INDEX: 30.59 KG/M2

## 2018-06-21 DIAGNOSIS — I10 ESSENTIAL HYPERTENSION: ICD-10-CM

## 2018-06-21 DIAGNOSIS — E78.5 HYPERLIPIDEMIA, UNSPECIFIED HYPERLIPIDEMIA TYPE: ICD-10-CM

## 2018-06-21 DIAGNOSIS — Z12.11 COLON CANCER SCREENING: ICD-10-CM

## 2018-06-21 DIAGNOSIS — Z00.00 ANNUAL PHYSICAL EXAM: Primary | ICD-10-CM

## 2018-06-21 DIAGNOSIS — M47.22 OSTEOARTHRITIS OF SPINE WITH RADICULOPATHY, CERVICAL REGION: ICD-10-CM

## 2018-06-21 PROCEDURE — 3078F DIAST BP <80 MM HG: CPT | Mod: CPTII,S$GLB,, | Performed by: FAMILY MEDICINE

## 2018-06-21 PROCEDURE — 99397 PER PM REEVAL EST PAT 65+ YR: CPT | Mod: S$GLB,,, | Performed by: FAMILY MEDICINE

## 2018-06-21 PROCEDURE — 99999 PR PBB SHADOW E&M-EST. PATIENT-LVL III: CPT | Mod: PBBFAC,,, | Performed by: FAMILY MEDICINE

## 2018-06-21 PROCEDURE — 3075F SYST BP GE 130 - 139MM HG: CPT | Mod: CPTII,S$GLB,, | Performed by: FAMILY MEDICINE

## 2018-06-21 NOTE — PROGRESS NOTES
"Subjective:      Patient ID: Kailey Stokes is a 67 y.o. female.    Chief Complaint: Annual Exam    HPI  66 yo female here for annual visit.  Followed by Gyn at [facility] for Mammo.  Up to date  Seeing back specialist, had recent MRI.  Will be going to pain management soon.  Off her BP med, does pretty well.  BP doesn't get too much higher than today, unless she is having a lot of pain.  Saw Dr. Fischer/Mame, I can see that note.  Had exercise treadmill, normal per pt.  She is on pravastatin for cholesterol.  Didn't tolerate the crestor/lipitor in the past.  Not been exercising, but planning to start back next week.  Bowels moving well, off all probiotics.  No falls.  Mood is good.    Past Medical History:   Diagnosis Date    Abnormal Pap smear     Anxiety     Dr. Bose    Breast disorder     Pain in left breast    Hyperlipidemia     Hypertension      Family History   Problem Relation Age of Onset    Diabetes Mother     Glaucoma Mother     Hypertension Father     Heart disease Father     Breast cancer Maternal Aunt     Diabetes Sister     Glaucoma Sister     Diabetes Brother      Past Surgical History:   Procedure Laterality Date    HYSTERECTOMY      KNEE SURGERY      TOTAL ABDOMINAL HYSTERECTOMY W/ BILATERAL SALPINGOOPHORECTOMY       Social History   Substance Use Topics    Smoking status: Never Smoker    Smokeless tobacco: Never Used    Alcohol use Yes      Comment: occ use       /74 (BP Location: Right arm, Patient Position: Sitting, BP Method: Large (Manual))   Pulse 82   Temp 97.8 °F (36.6 °C) (Tympanic)   Ht 5' 8.5" (1.74 m)   Wt 93.7 kg (206 lb 9.1 oz)   BMI 30.95 kg/m²     Review of Systems   Constitutional: Positive for activity change. Negative for appetite change, chills, diaphoresis, fatigue, fever and unexpected weight change.   HENT: Negative for ear pain, hearing loss, postnasal drip, rhinorrhea and tinnitus.    Eyes: Negative for visual disturbance.   Respiratory: " Negative for cough, shortness of breath and wheezing.    Cardiovascular: Negative for chest pain, palpitations and leg swelling.   Gastrointestinal: Negative for abdominal distention and abdominal pain.   Genitourinary: Negative for dysuria, frequency, hematuria and urgency.   Musculoskeletal: Positive for back pain. Negative for joint swelling.   Neurological: Negative for weakness and headaches.   Hematological: Negative for adenopathy.   Psychiatric/Behavioral: Negative for confusion and decreased concentration.       Objective:     Physical Exam   Constitutional: She is oriented to person, place, and time. She appears well-developed and well-nourished. No distress.   HENT:   Right Ear: External ear normal.   Left Ear: External ear normal.   Nose: Nose normal.   Mouth/Throat: Oropharynx is clear and moist.   Eyes: Conjunctivae are normal. Pupils are equal, round, and reactive to light.   Neck: Normal range of motion. Neck supple. Carotid bruit is not present.   Cardiovascular: Normal rate, regular rhythm and normal heart sounds.    Pulmonary/Chest: Effort normal and breath sounds normal. No respiratory distress. She has no wheezes. She has no rales.   Abdominal: Soft. Bowel sounds are normal. She exhibits no distension. There is no tenderness. There is no guarding.   Musculoskeletal: She exhibits no edema.   Neurological: She is alert and oriented to person, place, and time. No cranial nerve deficit.   Skin: Skin is warm and dry. No rash noted.   Psychiatric: She has a normal mood and affect. Her behavior is normal. Judgment and thought content normal.   Nursing note and vitals reviewed.      Lab Results   Component Value Date    WBC 6.11 06/12/2018    HGB 13.9 06/12/2018    HCT 40.5 06/12/2018     06/12/2018    CHOL 243 (H) 06/12/2018    TRIG 234 (H) 06/12/2018    HDL 52 06/12/2018    ALT 11 06/12/2018    AST 19 06/12/2018     06/12/2018    K 4.5 06/12/2018     06/12/2018    CREATININE 1.0  06/12/2018    BUN 11 06/12/2018    CO2 28 06/12/2018    TSH 1.739 06/12/2018    INR 1.0 02/08/2008    HGBA1C 5.6 01/26/2017       Assessment:     1. Annual physical exam    2. Osteoarthritis of spine with radiculopathy, cervical region    3. Hyperlipidemia, unspecified hyperlipidemia type    4. Essential hypertension    5. Colon cancer screening         Plan:     Annual physical exam    Osteoarthritis of spine with radiculopathy, cervical region    Hyperlipidemia, unspecified hyperlipidemia type    Essential hypertension    Colon cancer screening  -     Case request GI: COLONOSCOPY    Labs overall are stable  Lipids are ok at max pravastatin, has not tolerated stronger statins  BP stable, monitor.  Consider low dose HCTZ if any elevation.  Get back to exercise/healthy diet/weight loss  Due for Cscope according to report from 2011.  Order in  F/u annually and PRN  Get mammo from [facility]

## 2018-06-26 ENCOUNTER — DOCUMENTATION ONLY (OUTPATIENT)
Dept: ENDOSCOPY | Facility: HOSPITAL | Age: 68
End: 2018-06-26

## 2018-06-26 RX ORDER — SODIUM, POTASSIUM,MAG SULFATES 17.5-3.13G
SOLUTION, RECONSTITUTED, ORAL ORAL
Qty: 354 ML | Refills: 0 | Status: SHIPPED | OUTPATIENT
Start: 2018-06-26 | End: 2019-01-10 | Stop reason: ALTCHOICE

## 2018-06-26 NOTE — PROGRESS NOTES
06/26/18 Per [employer], Person pressed touch tone key to speak with an endoscopy .  Colonoscopy scheduled for 08/09/18. Instructions given and mailed. Suprep ordered.

## 2018-07-09 ENCOUNTER — TELEPHONE (OUTPATIENT)
Dept: INTERNAL MEDICINE | Facility: CLINIC | Age: 68
End: 2018-07-09

## 2018-07-09 NOTE — TELEPHONE ENCOUNTER
----- Message from Nataliia Lozano sent at 7/9/2018 12:08 PM CDT -----  Contact: Patient   Patient stated that her pressure is still high, Please call her at [phone].    Thanks  Td

## 2018-07-16 ENCOUNTER — TELEPHONE (OUTPATIENT)
Dept: INTERNAL MEDICINE | Facility: CLINIC | Age: 68
End: 2018-07-16

## 2018-07-16 RX ORDER — HYDROCHLOROTHIAZIDE 12.5 MG/1
12.5 TABLET ORAL DAILY
Qty: 30 TABLET | Refills: 11 | Status: SHIPPED | OUTPATIENT
Start: 2018-07-16 | End: 2019-01-10 | Stop reason: SINTOL

## 2018-07-16 NOTE — TELEPHONE ENCOUNTER
----- Message from Rosie Whalen sent at 7/16/2018 10:10 AM CDT -----  Contact: patient  Calling regarding the status of changing her BP medication due to BP elevation. Please call patient ASAP today @ [phone]. Thanks, [id number] [place]  [address] AT [facility] [address]  [address]  [place] [address]  Phone: [phone] Fax: [phone]

## 2018-07-16 NOTE — TELEPHONE ENCOUNTER
Will send in low dose hctz 12.5mg tablet to take each AM except morning of colonoscopy. Cont to check readings and can enroll in digital hypertension program possibly in future or followup with Dr HOGAN in 1-2 weeks for BP.

## 2018-07-16 NOTE — TELEPHONE ENCOUNTER
Pt states at her last visit with  she mentioned that her bp has been running higher than usual. She has been having neck pain. Dr. Flowers told her that it could be due the pain, but keep a log of the readings. If it consistently runs high, then she may put her on a low dose fluid medicine. She said, her bp this morning wsas 139/95. BP readings over the past few days were: 137/83, 139/84, 126/ 78. She said, those readings are high for her. She said, she's having a colonoscopy on August 8th and wants to make sure her bp is ok.

## 2018-07-23 RX ORDER — PRAVASTATIN SODIUM 80 MG/1
TABLET ORAL
Qty: 30 TABLET | Refills: 6 | Status: SHIPPED | OUTPATIENT
Start: 2018-07-23 | End: 2019-05-19 | Stop reason: SDUPTHER

## 2018-08-15 ENCOUNTER — OFFICE VISIT (OUTPATIENT)
Dept: INTERNAL MEDICINE | Facility: CLINIC | Age: 68
End: 2018-08-15
Payer: MEDICARE

## 2018-08-15 VITALS
BODY MASS INDEX: 30.5 KG/M2 | HEART RATE: 72 BPM | TEMPERATURE: 98 F | HEIGHT: 69 IN | SYSTOLIC BLOOD PRESSURE: 118 MMHG | WEIGHT: 205.94 LBS | DIASTOLIC BLOOD PRESSURE: 82 MMHG

## 2018-08-15 DIAGNOSIS — F41.0 PANIC ATTACKS: ICD-10-CM

## 2018-08-15 DIAGNOSIS — Z12.11 COLON CANCER SCREENING: ICD-10-CM

## 2018-08-15 DIAGNOSIS — F41.9 ANXIETY: Primary | ICD-10-CM

## 2018-08-15 PROCEDURE — 99999 PR PBB SHADOW E&M-EST. PATIENT-LVL III: CPT | Mod: PBBFAC,,, | Performed by: FAMILY MEDICINE

## 2018-08-15 PROCEDURE — 3079F DIAST BP 80-89 MM HG: CPT | Mod: CPTII,S$GLB,, | Performed by: FAMILY MEDICINE

## 2018-08-15 PROCEDURE — 99213 OFFICE O/P EST LOW 20 MIN: CPT | Mod: S$GLB,,, | Performed by: FAMILY MEDICINE

## 2018-08-15 PROCEDURE — 3074F SYST BP LT 130 MM HG: CPT | Mod: CPTII,S$GLB,, | Performed by: FAMILY MEDICINE

## 2018-08-15 RX ORDER — CLONAZEPAM 0.5 MG/1
0.5 TABLET ORAL NIGHTLY
Qty: 30 TABLET | Refills: 2 | Status: SHIPPED | OUTPATIENT
Start: 2018-08-15 | End: 2019-01-03 | Stop reason: SDUPTHER

## 2018-08-15 NOTE — PROGRESS NOTES
"Subjective:      Patient ID: Kailey Stokes is a 68 y.o. female.    Chief Complaint: Panic Attack    HPI  69 yo female here with c/o panic attacks and anxiety.  Seems it may have started around time she went for MRI of neck.  Couldn't do closed, had to go for open.  Has hx of anxiety and was bad before.  Asking for something she took in past at bedtime for awhile.  Cancelled her cscope b/c anxiety is too bad.  Bp stable    Past Medical History:   Diagnosis Date    Abnormal Pap smear     Anxiety     Dr. Bose    Breast disorder     Pain in left breast    Hyperlipidemia     Hypertension      Family History   Problem Relation Age of Onset    Diabetes Mother     Glaucoma Mother     Hypertension Father     Heart disease Father     Breast cancer Maternal Aunt     Diabetes Sister     Glaucoma Sister     Diabetes Brother      Past Surgical History:   Procedure Laterality Date    HYSTERECTOMY      KNEE SURGERY      TOTAL ABDOMINAL HYSTERECTOMY W/ BILATERAL SALPINGOOPHORECTOMY       Social History     Tobacco Use    Smoking status: Never Smoker    Smokeless tobacco: Never Used   Substance Use Topics    Alcohol use: Yes     Comment: occ use    Drug use: No       /82 (BP Location: Left arm, Patient Position: Sitting, BP Method: Large (Manual))   Pulse 72   Temp 97.9 °F (36.6 °C) (Tympanic)   Ht 5' 8.5" (1.74 m)   Wt 93.4 kg (205 lb 14.6 oz)   BMI 30.85 kg/m²     Review of Systems   Constitutional: Positive for activity change.   Psychiatric/Behavioral: The patient is nervous/anxious.        Objective:     Physical Exam   Constitutional: She is oriented to person, place, and time. She appears well-developed and well-nourished.   Neurological: She is alert and oriented to person, place, and time.   Skin: Skin is warm and dry.   Psychiatric: She has a normal mood and affect. Her behavior is normal. Judgment and thought content normal.   Nursing note and vitals reviewed.      Lab Results   Component " Value Date    WBC 6.11 06/12/2018    HGB 13.9 06/12/2018    HCT 40.5 06/12/2018     06/12/2018    CHOL 243 (H) 06/12/2018    TRIG 234 (H) 06/12/2018    HDL 52 06/12/2018    ALT 11 06/12/2018    AST 19 06/12/2018     06/12/2018    K 4.5 06/12/2018     06/12/2018    CREATININE 1.0 06/12/2018    BUN 11 06/12/2018    CO2 28 06/12/2018    TSH 1.739 06/12/2018    INR 1.0 02/08/2008    HGBA1C 5.6 01/26/2017       Assessment:     1. Anxiety    2. Panic attacks    3. Colon cancer screening         Plan:     Anxiety    Panic attacks    Colon cancer screening  -     Case request GI: COLONOSCOPY    Other orders  -     clonazePAM (KLONOPIN) 0.5 MG tablet; Take 1 tablet (0.5 mg total) by mouth every evening.  Dispense: 30 tablet; Refill: 2    Klonopin nightly PRN, is was in med hx.  She thinks that was what she took in the past for awhile  Focus on healthy coping skills/breathing techniques  Cscope order in  Follow-up in about 12 weeks (around 11/7/2018).

## 2018-08-17 ENCOUNTER — DOCUMENTATION ONLY (OUTPATIENT)
Dept: ENDOSCOPY | Facility: HOSPITAL | Age: 68
End: 2018-08-17

## 2018-08-17 NOTE — PROGRESS NOTES
08/17/18. Pt called and rescheduled colonoscopy to 10/18/18. Instructions mailed. Suprep already ordered.

## 2018-09-18 ENCOUNTER — TELEPHONE (OUTPATIENT)
Dept: INTERNAL MEDICINE | Facility: CLINIC | Age: 68
End: 2018-09-18

## 2018-09-18 NOTE — TELEPHONE ENCOUNTER
Called pt and let know that Dr. Flowers doesn't have anything available but can schedule with Lucy Jeong.  Booked for 9-19-18 at 9:00 am.

## 2018-09-18 NOTE — TELEPHONE ENCOUNTER
----- Message from Naeem Philippe sent at 9/18/2018  4:36 PM CDT -----  Contact: pt   Pt would like to be worked in for lump on neck for 9/19/18         ..[phone] (home) [phone] (work)

## 2018-09-18 NOTE — TELEPHONE ENCOUNTER
Called Barbara back, she said that pt said she is only taking pravastatin 1 every other day, that Dr. Flowers told her it was okay to do.  I told them we have her taking 1 daily, don't see documentation of a change.  She said she will tell pt and have her call us to discuss.

## 2018-09-18 NOTE — TELEPHONE ENCOUNTER
----- Message from Becka Giron sent at 9/18/2018  4:04 PM CDT -----  Barbara Manntheresas ) is requesting a call from nurse to get directions on a prescription.          Please call Barbara Ramos ) back at [phone]

## 2018-09-19 ENCOUNTER — LAB VISIT (OUTPATIENT)
Dept: LAB | Facility: HOSPITAL | Age: 68
End: 2018-09-19
Attending: NURSE PRACTITIONER
Payer: MEDICARE

## 2018-09-19 ENCOUNTER — OFFICE VISIT (OUTPATIENT)
Dept: INTERNAL MEDICINE | Facility: CLINIC | Age: 68
End: 2018-09-19
Payer: MEDICARE

## 2018-09-19 VITALS
HEIGHT: 68 IN | DIASTOLIC BLOOD PRESSURE: 82 MMHG | WEIGHT: 203.69 LBS | SYSTOLIC BLOOD PRESSURE: 124 MMHG | RESPIRATION RATE: 16 BRPM | TEMPERATURE: 97 F | HEART RATE: 76 BPM | BODY MASS INDEX: 30.87 KG/M2

## 2018-09-19 DIAGNOSIS — R22.2 SUPRACLAVICULAR MASS: ICD-10-CM

## 2018-09-19 DIAGNOSIS — R22.2 SUPRACLAVICULAR MASS: Primary | ICD-10-CM

## 2018-09-19 DIAGNOSIS — Z80.7 FAMILY HISTORY OF LYMPHOMA: ICD-10-CM

## 2018-09-19 LAB
BASOPHILS # BLD AUTO: 0.03 K/UL
BASOPHILS NFR BLD: 0.5 %
DIFFERENTIAL METHOD: NORMAL
EOSINOPHIL # BLD AUTO: 0.1 K/UL
EOSINOPHIL NFR BLD: 1.9 %
ERYTHROCYTE [DISTWIDTH] IN BLOOD BY AUTOMATED COUNT: 13.2 %
HCT VFR BLD AUTO: 39.7 %
HGB BLD-MCNC: 13.5 G/DL
IMM GRANULOCYTES # BLD AUTO: 0.01 K/UL
IMM GRANULOCYTES NFR BLD AUTO: 0.2 %
LYMPHOCYTES # BLD AUTO: 2.3 K/UL
LYMPHOCYTES NFR BLD: 39.3 %
MCH RBC QN AUTO: 28.8 PG
MCHC RBC AUTO-ENTMCNC: 34 G/DL
MCV RBC AUTO: 85 FL
MONOCYTES # BLD AUTO: 0.5 K/UL
MONOCYTES NFR BLD: 8.2 %
NEUTROPHILS # BLD AUTO: 2.9 K/UL
NEUTROPHILS NFR BLD: 49.9 %
NRBC BLD-RTO: 0 /100 WBC
PLATELET # BLD AUTO: 285 K/UL
PMV BLD AUTO: 10.2 FL
RBC # BLD AUTO: 4.68 M/UL
WBC # BLD AUTO: 5.83 K/UL

## 2018-09-19 PROCEDURE — 1101F PT FALLS ASSESS-DOCD LE1/YR: CPT | Mod: CPTII,,, | Performed by: NURSE PRACTITIONER

## 2018-09-19 PROCEDURE — 99213 OFFICE O/P EST LOW 20 MIN: CPT | Mod: PBBFAC,PO | Performed by: NURSE PRACTITIONER

## 2018-09-19 PROCEDURE — 99999 PR PBB SHADOW E&M-EST. PATIENT-LVL III: CPT | Mod: PBBFAC,,, | Performed by: NURSE PRACTITIONER

## 2018-09-19 PROCEDURE — 85025 COMPLETE CBC W/AUTO DIFF WBC: CPT

## 2018-09-19 PROCEDURE — 36415 COLL VENOUS BLD VENIPUNCTURE: CPT | Mod: PO

## 2018-09-19 PROCEDURE — 3079F DIAST BP 80-89 MM HG: CPT | Mod: CPTII,,, | Performed by: NURSE PRACTITIONER

## 2018-09-19 PROCEDURE — 3074F SYST BP LT 130 MM HG: CPT | Mod: CPTII,,, | Performed by: NURSE PRACTITIONER

## 2018-09-19 PROCEDURE — 99214 OFFICE O/P EST MOD 30 MIN: CPT | Mod: S$PBB,,, | Performed by: NURSE PRACTITIONER

## 2018-09-19 NOTE — PROGRESS NOTES
"Subjective:       Patient ID: Kailey Stokes is a 68 y.o. female.    Chief Complaint: Mass    Patient comes in today with a mass to the supraclavicular area on the right side.  She noticed this yesterday when looking in the mirror.  Her sister has a history of lymphoma that she is concerned.  There has been no fever.  No recent upper respiratory illness.  No sore throat.  The mass is not mobile and does not hurt.      Mass   This is a new problem. The current episode started yesterday. The problem occurs constantly. The problem has been unchanged. Pertinent negatives include no abdominal pain, anorexia, arthralgias, change in bowel habit, chest pain, chills, congestion, coughing, diaphoresis, fatigue, fever, headaches, joint swelling, myalgias, nausea, neck pain, numbness, rash, sore throat, swollen glands, urinary symptoms, vertigo, visual change, vomiting or weakness. Nothing aggravates the symptoms. She has tried nothing for the symptoms. The treatment provided no relief.       /82 (BP Location: Right arm, Patient Position: Sitting, BP Method: Medium (Automatic))   Pulse 76   Temp 97 °F (36.1 °C) (Tympanic)   Resp 16   Ht 5' 8" (1.727 m)   Wt 92.4 kg (203 lb 11.3 oz)   BMI 30.97 kg/m²     Review of Systems   Constitutional: Negative for activity change, appetite change, chills, diaphoresis, fatigue, fever and unexpected weight change.   HENT: Negative.  Negative for congestion, postnasal drip, rhinorrhea, sinus pressure, sinus pain, sneezing, sore throat, tinnitus, trouble swallowing and voice change.    Eyes: Negative for visual disturbance.   Respiratory: Negative for apnea, cough, choking, chest tightness, shortness of breath, wheezing and stridor.    Cardiovascular: Negative for chest pain, palpitations and leg swelling.   Gastrointestinal: Negative for abdominal distention, abdominal pain, anal bleeding, anorexia, blood in stool, change in bowel habit, constipation, diarrhea, nausea, rectal pain " and vomiting.   Genitourinary: Negative for decreased urine volume, difficulty urinating, dysuria, frequency, hematuria and urgency.   Musculoskeletal: Negative for arthralgias, joint swelling, myalgias and neck pain.   Skin: Negative for color change, pallor, rash and wound.        Mass to supraclavicular area   Neurological: Negative.  Negative for dizziness, vertigo, syncope, speech difficulty, weakness, light-headedness, numbness and headaches.   Hematological: Negative for adenopathy. Does not bruise/bleed easily.   Psychiatric/Behavioral: Negative for agitation, confusion and hallucinations. The patient is not nervous/anxious.        Objective:      Physical Exam   Constitutional: She is oriented to person, place, and time. She appears well-developed and well-nourished. She is cooperative. No distress.   HENT:   Head: Normocephalic and atraumatic.   Nose: Nose normal.   Mouth/Throat: Uvula is midline, oropharynx is clear and moist and mucous membranes are normal.   Eyes: Conjunctivae are normal. Right eye exhibits no discharge. Left eye exhibits no discharge.   Neck: Trachea normal, normal range of motion, full passive range of motion without pain and phonation normal. Neck supple. No tracheal tenderness, no spinous process tenderness and no muscular tenderness present. No neck rigidity. No tracheal deviation, no edema, no erythema and normal range of motion present. No thyroid mass and no thyromegaly present.       Firm, fixed area of fullness to the right clavicular area. This area is more prominent compared to left. No redness or warmth. Non tender.    Cardiovascular: Normal rate, regular rhythm and normal heart sounds.   No murmur heard.  Pulmonary/Chest: Effort normal and breath sounds normal. No stridor. No respiratory distress. She has no decreased breath sounds. She has no wheezes. She has no rhonchi. She has no rales. She exhibits no tenderness.   Abdominal: Soft. She exhibits no distension.    Musculoskeletal: Normal range of motion.   Lymphadenopathy:        Head (right side): No submental, no submandibular, no tonsillar, no preauricular, no posterior auricular and no occipital adenopathy present.        Head (left side): No submental, no submandibular, no tonsillar, no preauricular, no posterior auricular and no occipital adenopathy present.     She has no cervical adenopathy.     She has no axillary adenopathy.        Right: Supraclavicular adenopathy present.   Neurological: She is alert and oriented to person, place, and time.   Skin: Skin is warm, dry and intact. No rash noted. She is not diaphoretic.   Psychiatric: She has a normal mood and affect. Her behavior is normal. Judgment and thought content normal.   Nursing note and vitals reviewed.      Assessment:       1. Supraclavicular mass    2. Family history of lymphoma        Plan:       Kailey was seen today for mass.    Diagnoses and all orders for this visit:    Supraclavicular mass  -     US Soft Tissue Head Neck Thyroid; Future  -     CBC auto differential; Future    Family history of lymphoma    discussed with Dr. Flowers who agrees with plan  Will follow up with results when available.

## 2018-09-21 ENCOUNTER — HOSPITAL ENCOUNTER (OUTPATIENT)
Dept: RADIOLOGY | Facility: HOSPITAL | Age: 68
Discharge: HOME | End: 2018-09-21
Attending: NURSE PRACTITIONER
Payer: MEDICARE

## 2018-09-21 DIAGNOSIS — R22.2 SUPRACLAVICULAR MASS: ICD-10-CM

## 2018-09-21 PROCEDURE — 76536 US EXAM OF HEAD AND NECK: CPT | Mod: 26,,, | Performed by: RADIOLOGY

## 2018-09-21 PROCEDURE — 76536 US EXAM OF HEAD AND NECK: CPT | Mod: TC

## 2018-10-02 RX ORDER — PANTOPRAZOLE SODIUM 40 MG/1
TABLET, DELAYED RELEASE ORAL
Qty: 30 TABLET | Refills: 3 | Status: SHIPPED | OUTPATIENT
Start: 2018-10-02 | End: 2019-05-27 | Stop reason: SDUPTHER

## 2018-10-08 ENCOUNTER — OFFICE VISIT (OUTPATIENT)
Dept: URGENT CARE | Facility: CLINIC | Age: 68
End: 2018-10-08
Payer: MEDICARE

## 2018-10-08 VITALS
OXYGEN SATURATION: 98 % | BODY MASS INDEX: 30.64 KG/M2 | WEIGHT: 201.5 LBS | TEMPERATURE: 98 F | RESPIRATION RATE: 18 BRPM | HEART RATE: 89 BPM | SYSTOLIC BLOOD PRESSURE: 122 MMHG | DIASTOLIC BLOOD PRESSURE: 74 MMHG

## 2018-10-08 DIAGNOSIS — R30.0 DYSURIA: Primary | ICD-10-CM

## 2018-10-08 DIAGNOSIS — Z79.890 HORMONE REPLACEMENT THERAPY: ICD-10-CM

## 2018-10-08 DIAGNOSIS — R35.0 FREQUENCY OF URINATION: ICD-10-CM

## 2018-10-08 DIAGNOSIS — R10.9 FLANK PAIN: ICD-10-CM

## 2018-10-08 LAB
BILIRUB SERPL-MCNC: ABNORMAL MG/DL
BLOOD URINE, POC: ABNORMAL
COLOR, POC UA: ABNORMAL
GLUCOSE UR QL STRIP: NORMAL
KETONES UR QL STRIP: NEGATIVE
LEUKOCYTE ESTERASE URINE, POC: NEGATIVE
NITRITE, POC UA: NEGATIVE
PH, POC UA: 5
PROTEIN, POC: ABNORMAL
SPECIFIC GRAVITY, POC UA: 1.02
UROBILINOGEN, POC UA: NORMAL

## 2018-10-08 PROCEDURE — 81001 URINALYSIS AUTO W/SCOPE: CPT | Mod: PBBFAC,PO | Performed by: NURSE PRACTITIONER

## 2018-10-08 PROCEDURE — 3078F DIAST BP <80 MM HG: CPT | Mod: CPTII,,, | Performed by: NURSE PRACTITIONER

## 2018-10-08 PROCEDURE — 3074F SYST BP LT 130 MM HG: CPT | Mod: CPTII,,, | Performed by: NURSE PRACTITIONER

## 2018-10-08 PROCEDURE — 99214 OFFICE O/P EST MOD 30 MIN: CPT | Mod: PBBFAC,PO | Performed by: NURSE PRACTITIONER

## 2018-10-08 PROCEDURE — 99999 PR PBB SHADOW E&M-EST. PATIENT-LVL IV: CPT | Mod: PBBFAC,,, | Performed by: NURSE PRACTITIONER

## 2018-10-08 PROCEDURE — 1101F PT FALLS ASSESS-DOCD LE1/YR: CPT | Mod: CPTII,,, | Performed by: NURSE PRACTITIONER

## 2018-10-08 PROCEDURE — 99214 OFFICE O/P EST MOD 30 MIN: CPT | Mod: S$PBB,,, | Performed by: NURSE PRACTITIONER

## 2018-10-08 PROCEDURE — 87086 URINE CULTURE/COLONY COUNT: CPT

## 2018-10-08 RX ORDER — GABAPENTIN 300 MG/1
1 CAPSULE ORAL
COMMUNITY
Start: 2018-05-26 | End: 2019-06-25 | Stop reason: SDUPTHER

## 2018-10-08 RX ORDER — ESTRADIOL 0.5 MG/1
0.5 TABLET ORAL DAILY
Qty: 30 TABLET | Refills: 0 | Status: SHIPPED | OUTPATIENT
Start: 2018-10-08 | End: 2021-10-25

## 2018-10-08 RX ORDER — ESTRADIOL 0.5 MG/1
1 TABLET ORAL
COMMUNITY
Start: 2018-04-27 | End: 2018-10-08

## 2018-10-08 RX ORDER — SULFAMETHOXAZOLE AND TRIMETHOPRIM 800; 160 MG/1; MG/1
1 TABLET ORAL 2 TIMES DAILY
Qty: 10 TABLET | Refills: 0 | Status: SHIPPED | OUTPATIENT
Start: 2018-10-08 | End: 2018-10-13

## 2018-10-08 RX ORDER — PRAVASTATIN SODIUM 80 MG/1
1 TABLET ORAL
COMMUNITY
Start: 2018-04-27 | End: 2018-10-08

## 2018-10-08 NOTE — PATIENT INSTRUCTIONS
Dysuria with Uncertain Cause (Adult)    The urethra is the tube that allows urine to pass out of the body. In a woman, the urethra is the opening above the vagina. In men, the urethra is the opening on the tip of the penis. Dysuria is the feeling of pain or burning in the urethra when passing urine.  Dysuria can be caused by anything that irritates or inflames the urethra. An infection or chemical irritation can cause this reaction. A bladder infection is the most common cause of dysuria in adults. A urine test can diagnose this. A bladder infection needs antibiotic treatment.  Soaps, lotions, colognes and feminine hygiene products can cause dysuria. So can birth control jellies, creams, and foams. It will go away 1 to 3 days after using these irritants.  Sexually transmitted diseases (STDs) such as chlamydia or gonorrhea can cause dysuria. Your healthcare provider may take a culture sample. Your provider may start you on antibiotic medicine before the culture test returns.  In women who have gone through menopause, dysuria can be from dryness in the lining of the urethra. This can be treated with hormones. Dysuria becomes long-term (chronic) when it lasts for weeks or months. You may need to see a specialist (urologist) to diagnose and treat chronic dysuria.  Home care  These home care tips may help:  · Don't use any chemicals or products that you think may be causing your symptoms.  · If you were given a prescription medicine, take as directed. Be sure to take it until it is all used up.  · If a culture was taken, don't have sex until you have been told that it is negative. This means you don't have an infection. Then follow your healthcare provider's advice to treat your condition.  If a culture was done and it is positive:  · Both you and your sexual partner may need to be treated. This is true even if your partner has no symptoms.  · Contact your healthcare provider or go to an urgent care clinic or the  [facility] health department to be looked at and treated.  · Don't have sex until both you and your partner(s) have finished all antibiotics and your healthcare provider says you are no longer contagious.  · Learn about and use safe sex practices. The safest sex is with a partner who has tested negative and only has sex with you. Condoms can prevent STDs from spreading, but they aren't a guarantee.  Follow-up care  Follow up with your healthcare provider, or as advised. If a culture was taken, you may call as directed for the results. If you have an STD, follow up with your provider or the public health department for a complete STD screening, including HIV testing. For more information, contact CDC-INFO at 270-171-9779.  When to seek medical advice  Call your healthcare provider right away if any of these occur:  · You aren't better after 3 days of treatment  · Fever of 100.4ºF (38ºC) or higher, or as directed by your healthcare provider  · Back or belly pain that gets worse  · You can't urinate because of pain  · New discharge from the urethra, vagina, or penis  · Painful sores on the penis  · Rash or joint pain  · Painful lumps (lymph nodes) in the groin  · Testicle pain or swelling of the scrotum  Date Last Reviewed: 11/1/2016  © 3392-7426 [employer]. [address] [place] [address], [place] [address]. All rights reserved. This information is not intended as a substitute for professional medical care. Always follow your healthcare professional's instructions.

## 2018-10-08 NOTE — PROGRESS NOTES
Subjective:       Patient ID: Kailey Stokes is a 68 y.o. female.    Chief Complaint: Urinary Tract Infection    Pt is a 68 year old female to clinic today with complaints of right sided flank pain, frequency, dysuria and lower abd pain that began 1-2 weeks ago. Pt also requesting 30 day supply of hormone replacement. She is seeking new GYN and has appt in 2 weeks.       Dysuria    This is a recurrent problem. The current episode started 1 to 4 weeks ago. The problem occurs every urination. The problem has been gradually worsening. The quality of the pain is described as aching (right flank). The pain is at a severity of 6/10. The pain is moderate. Maximum temperature: subjective. Associated symptoms include flank pain, frequency, hesitancy, urgency and withholding. Pertinent negatives include no behavior changes, chills, discharge, hematuria, nausea, possible pregnancy, sweats, vomiting, weight loss, bubble bath use, constipation or rash. She has tried nothing for the symptoms.     Review of Systems   Constitutional: Negative for chills, diaphoresis, fatigue, fever and weight loss.   Gastrointestinal: Positive for abdominal pain. Negative for constipation, diarrhea, nausea and vomiting.   Genitourinary: Positive for dysuria, flank pain, frequency, hesitancy and urgency. Negative for difficulty urinating and hematuria.   Musculoskeletal: Negative for back pain, gait problem, joint swelling, myalgias and neck pain.   Skin: Negative for rash.   Neurological: Negative for dizziness, weakness, light-headedness and headaches.       Objective:      Physical Exam   Constitutional: She is oriented to person, place, and time. She appears well-developed and well-nourished. No distress.   Cardiovascular:   Pulses:       Radial pulses are 2+ on the right side, and 2+ on the left side.   Abdominal: Soft. Normal appearance. There is no tenderness. There is no CVA tenderness.   Musculoskeletal:        Right shoulder: She exhibits  normal range of motion, no tenderness, no bony tenderness, no swelling, no effusion, no crepitus, no deformity, no laceration, no pain, no spasm, normal pulse and normal strength.        Left shoulder: She exhibits normal range of motion, no tenderness, no bony tenderness, no swelling, no effusion, no crepitus, no deformity, no laceration, no pain, no spasm, normal pulse and normal strength.        Cervical back: She exhibits normal range of motion, no tenderness, no bony tenderness, no swelling, no edema, no deformity, no laceration, no pain and no spasm.        Thoracic back: She exhibits normal range of motion, no tenderness, no bony tenderness, no swelling, no edema, no deformity, no laceration, no pain and no spasm.        Lumbar back: She exhibits normal range of motion, no tenderness, no bony tenderness, no swelling, no edema, no deformity, no laceration, no pain and no spasm.   Neurological: She is alert and oriented to person, place, and time.   Skin: Skin is warm and dry. No rash noted. She is not diaphoretic.   Psychiatric: She has a normal mood and affect. Her speech is normal and behavior is normal. Thought content normal.   Nursing note and vitals reviewed.      Assessment:       1. Dysuria    2. Flank pain    3. Frequency of urination        Plan:   Dysuria  -     POCT urinalysis, dipstick or tablet reag  -     Urine culture  -     sulfamethoxazole-trimethoprim 800-160mg (BACTRIM DS) 800-160 mg Tab; Take 1 tablet by mouth 2 (two) times daily. for 5 days  Dispense: 10 tablet; Refill: 0    Flank pain  -     POCT urinalysis, dipstick or tablet reag  -     Urine culture    Frequency of urination  -     POCT urinalysis, dipstick or tablet reag  -     Urine culture  -     sulfamethoxazole-trimethoprim 800-160mg (BACTRIM DS) 800-160 mg Tab; Take 1 tablet by mouth 2 (two) times daily. for 5 days  Dispense: 10 tablet; Refill: 0      · Increase fluids (avoid caffeine or sugary beverages as these will irritate  the bladder).   · Take your antibiotics exactly as prescribed and make sure to complete entire course even if you begin to feel better. This will prevent recurrence of infection and antibiotic resistance.   · We have prescribed an antibiotic that covers most bacteria that cause urinary tract infections, however, if your urine culture shows that you have any bacterial resistance to the antibiotic you were prescribed or an unusual bacterial strain, we will notify you and make any necessary changes. Urine cultures usually result in about three days.   · Please follow up with your primary care provider if your symptoms do not improve within 2-3 days or sooner for any new or worsening symptoms.  · For any severe pain, bright red blood in urine, difficulty urinating, fever that does not improve with Tylenol or Ibuprofen, inability to urinate, incontinence of urine or bowels, or for any other urgent concerns, please go to the ER for immediate evaluation.

## 2018-10-10 LAB — BACTERIA UR CULT: NORMAL

## 2018-10-16 ENCOUNTER — TELEPHONE (OUTPATIENT)
Dept: URGENT CARE | Facility: CLINIC | Age: 68
End: 2018-10-16

## 2018-10-16 NOTE — TELEPHONE ENCOUNTER
----- Message from Zonia Vaughan sent at 10/16/2018 11:54 AM CDT -----  Contact: pt  She is calling in regards to her results from her lab work completed with urgent care, please advise [phone] (home) [phone] (cell)

## 2018-10-16 NOTE — TELEPHONE ENCOUNTER
Spoke with pt informed her of urine culture.     Urine culture   Order: [id number]   Status:  Final result   Visible to patient:  No (Not Released) Next appt:  11/15/2018 at 11:20 AM in Internal Medicine (Karel Flowers MD) Dx:  Dysuria; Frequency of urination; Flan...   Specimen Information: Urine, Clean Catch        Component 8d ago   Urine Culture, Routine No significant growth    Resulting Agency [facility]         Specimen Collected: 10/08/18 09:15 Last Resulted: 10/10/18 00:22 Lab Flowsheet Order Details View Encounter Lab and            Pt informed to continue treatment plan as discuss in the office and to follow up with pcp if symptom persist. Pt stated understanding thanks for calling.

## 2018-10-18 ENCOUNTER — ANESTHESIA (OUTPATIENT)
Dept: ENDOSCOPY | Facility: HOSPITAL | Age: 68
End: 2018-10-18
Payer: MEDICARE

## 2018-10-18 ENCOUNTER — ANESTHESIA EVENT (OUTPATIENT)
Dept: ENDOSCOPY | Facility: HOSPITAL | Age: 68
End: 2018-10-18
Payer: MEDICARE

## 2018-10-18 ENCOUNTER — HOSPITAL ENCOUNTER (OUTPATIENT)
Facility: HOSPITAL | Age: 68
Discharge: HOME | End: 2018-10-18
Attending: FAMILY MEDICINE | Admitting: FAMILY MEDICINE
Payer: MEDICARE

## 2018-10-18 VITALS
SYSTOLIC BLOOD PRESSURE: 134 MMHG | RESPIRATION RATE: 18 BRPM | OXYGEN SATURATION: 99 % | HEIGHT: 69 IN | DIASTOLIC BLOOD PRESSURE: 79 MMHG | HEART RATE: 68 BPM | BODY MASS INDEX: 29.62 KG/M2 | WEIGHT: 200 LBS | TEMPERATURE: 98 F

## 2018-10-18 DIAGNOSIS — Z12.11 COLON CANCER SCREENING: Primary | ICD-10-CM

## 2018-10-18 DIAGNOSIS — K63.5 POLYP OF COLON, UNSPECIFIED PART OF COLON, UNSPECIFIED TYPE: ICD-10-CM

## 2018-10-18 DIAGNOSIS — Z12.11 SPECIAL SCREENING FOR MALIGNANT NEOPLASMS, COLON: ICD-10-CM

## 2018-10-18 DIAGNOSIS — Z83.719 FAMILY HISTORY OF COLONIC POLYPS: ICD-10-CM

## 2018-10-18 PROCEDURE — 45380 COLONOSCOPY AND BIOPSY: CPT | Performed by: FAMILY MEDICINE

## 2018-10-18 PROCEDURE — 27201012 HC FORCEPS, HOT/COLD, DISP: Performed by: FAMILY MEDICINE

## 2018-10-18 PROCEDURE — 25000003 PHARM REV CODE 250: Performed by: FAMILY MEDICINE

## 2018-10-18 PROCEDURE — 63600175 PHARM REV CODE 636 W HCPCS: Performed by: NURSE ANESTHETIST, CERTIFIED REGISTERED

## 2018-10-18 PROCEDURE — 88305 TISSUE EXAM BY PATHOLOGIST: CPT | Mod: 26,,, | Performed by: PATHOLOGY

## 2018-10-18 PROCEDURE — 88305 TISSUE EXAM BY PATHOLOGIST: CPT | Performed by: PATHOLOGY

## 2018-10-18 PROCEDURE — 37000008 HC ANESTHESIA 1ST 15 MINUTES: Performed by: FAMILY MEDICINE

## 2018-10-18 PROCEDURE — 37000009 HC ANESTHESIA EA ADD 15 MINS: Performed by: FAMILY MEDICINE

## 2018-10-18 PROCEDURE — 25000003 PHARM REV CODE 250: Performed by: NURSE ANESTHETIST, CERTIFIED REGISTERED

## 2018-10-18 PROCEDURE — 45380 COLONOSCOPY AND BIOPSY: CPT | Mod: PT,,, | Performed by: FAMILY MEDICINE

## 2018-10-18 RX ORDER — PROPOFOL 10 MG/ML
VIAL (ML) INTRAVENOUS
Status: DISCONTINUED | OUTPATIENT
Start: 2018-10-18 | End: 2018-10-18

## 2018-10-18 RX ORDER — SODIUM CHLORIDE, SODIUM LACTATE, POTASSIUM CHLORIDE, CALCIUM CHLORIDE 600; 310; 30; 20 MG/100ML; MG/100ML; MG/100ML; MG/100ML
INJECTION, SOLUTION INTRAVENOUS CONTINUOUS
Status: DISCONTINUED | OUTPATIENT
Start: 2018-10-18 | End: 2018-10-18 | Stop reason: HOSPADM

## 2018-10-18 RX ORDER — LIDOCAINE HYDROCHLORIDE 10 MG/ML
INJECTION INFILTRATION; PERINEURAL
Status: DISCONTINUED | OUTPATIENT
Start: 2018-10-18 | End: 2018-10-18

## 2018-10-18 RX ORDER — SODIUM CHLORIDE 0.9 % (FLUSH) 0.9 %
3 SYRINGE (ML) INJECTION
Status: CANCELLED | OUTPATIENT
Start: 2018-10-18

## 2018-10-18 RX ADMIN — PROPOFOL 30 MG: 10 INJECTION, EMULSION INTRAVENOUS at 11:10

## 2018-10-18 RX ADMIN — LIDOCAINE HYDROCHLORIDE 50 MG: 10 INJECTION, SOLUTION INFILTRATION; PERINEURAL at 10:10

## 2018-10-18 RX ADMIN — SODIUM CHLORIDE, SODIUM LACTATE, POTASSIUM CHLORIDE, AND CALCIUM CHLORIDE: .6; .31; .03; .02 INJECTION, SOLUTION INTRAVENOUS at 10:10

## 2018-10-18 RX ADMIN — PROPOFOL 100 MG: 10 INJECTION, EMULSION INTRAVENOUS at 10:10

## 2018-10-18 NOTE — ANESTHESIA RELEASE NOTE
"Anesthesia Release from PACU Note    Patient: Kailey Stokes    Procedure(s) Performed: Procedure(s) (LRB):  COLONOSCOPY (N/A)    Anesthesia type: MAC    Post pain: Adequate analgesia    Post assessment: no apparent anesthetic complications    Last Vitals:   Visit Vitals  BP (!) 148/79   Pulse 86   Temp 36.7 °C (98.1 °F) (Oral)   Resp 16   Ht 5' 9" (1.753 m)   Wt 90.7 kg (200 lb)   SpO2 98%   Breastfeeding? No   BMI 29.53 kg/m²       Post vital signs: stable    Level of consciousness: awake    Nausea/Vomiting: no nausea/no vomiting    Complications: none    Airway Patency: patent    Respiratory: unassisted    Cardiovascular: stable and blood pressure at baseline    Hydration: euvolemic  "

## 2018-10-18 NOTE — ANESTHESIA POSTPROCEDURE EVALUATION
"Anesthesia Post Evaluation    Patient: Kailey Stokes    Procedure(s) Performed: Procedure(s) (LRB):  COLONOSCOPY (N/A)    Final Anesthesia Type: MAC  Patient location during evaluation: PACU  Patient participation: Yes- Able to Participate  Level of consciousness: awake and alert  Post-procedure vital signs: reviewed and stable  Pain management: adequate  Airway patency: patent  PONV status at discharge: No PONV  Anesthetic complications: no      Cardiovascular status: blood pressure returned to baseline  Respiratory status: unassisted  Hydration status: euvolemic          Visit Vitals  BP (!) 148/79   Pulse 86   Temp 36.7 °C (98.1 °F) (Oral)   Resp 16   Ht 5' 9" (1.753 m)   Wt 90.7 kg (200 lb)   SpO2 98%   Breastfeeding? No   BMI 29.53 kg/m²       Pain/Micaela Score: Pain Assessment Performed: Yes (10/18/2018 10:39 AM)  Presence of Pain: denies (10/18/2018 10:39 AM)        "

## 2018-10-18 NOTE — ANESTHESIA PREPROCEDURE EVALUATION
10/18/2018  Kailey Stokes is a 68 y.o., female.    Anesthesia Evaluation    I have reviewed the Patient Summary Reports.    I have reviewed the Nursing Notes.   I have reviewed the Medications.     Review of Systems  Anesthesia Hx:  No problems with previous Anesthesia    Social:  Non-Smoker    Hematology/Oncology:  Hematology Normal   Oncology Normal     EENT/Dental:EENT/Dental Normal   Cardiovascular:   Hypertension, well controlled hyperlipidemia    Pulmonary:  Pulmonary Normal    Renal/:  Renal/ Normal     Hepatic/GI:   Bowel Prep.    Musculoskeletal:  Musculoskeletal Normal    Neurological:  Neurology Normal    Endocrine:  Endocrine Normal    Dermatological:  Skin Normal    Psych:   anxiety          Physical Exam  General:  Morbid Obesity    Airway/Jaw/Neck:  Airway Findings: Mouth Opening: Normal Tongue: Normal  General Airway Assessment: Adult       Chest/Lungs:  Chest/Lungs Findings: Clear to auscultation     Heart/Vascular:  Heart Findings: Rate: Normal             Anesthesia Plan  Type of Anesthesia, risks & benefits discussed:  Anesthesia Type:  MAC  Patient's Preference:   Intra-op Monitoring Plan: standard ASA monitors  Intra-op Monitoring Plan Comments:   Post Op Pain Control Plan:   Post Op Pain Control Plan Comments:   Induction:   IV  Beta Blocker:  Patient is not currently on a Beta-Blocker (No further documentation required).       Informed Consent: Patient understands risks and agrees with Anesthesia plan.  Questions answered. Anesthesia consent signed with patient.  ASA Score: 2     Day of Surgery Review of History & Physical: I have interviewed and examined the patient. I have reviewed the patient's H&P dated:  There are no significant changes.          Ready For Surgery From Anesthesia Perspective.

## 2018-10-18 NOTE — DISCHARGE INSTRUCTIONS

## 2018-10-18 NOTE — H&P
Short Stay Endoscopy History and Physical    PCP - Karel Flowers MD    Procedure - Colonoscopy  ASA - 2  Mallampati - per anesthesia  History of Anesthesia problems - no  Family history Anesthesia problems -  no     HPI:  This is a 68 y.o. female here for evaluation of :   Active Hospital Problems    Diagnosis  POA    *Colon cancer screening [Z12.11]  Not Applicable    Family history of colonic polyps [Z83.71]  Not Applicable     Her brother and sister have both had colon polyps.        Resolved Hospital Problems   No resolved problems to display.         Health Maintenance       Date Due Completion Date    Colonoscopy 06/29/2018 6/29/2011    Override on 2/5/2004: Done    Mammogram 07/03/2018 7/3/2017 (Done)    Override on 7/3/2017: Done    Override on 6/29/2012: Done    Override on 6/8/2011: Done    Override on 6/28/2010: Done    Override on 6/7/2010: Done    Influenza Vaccine 08/01/2018 11/16/2017    Override on 12/7/2012: Done    Pneumococcal (65+) (2 of 2 - PPSV23) 08/23/2018 8/23/2017    Lipid Panel 06/12/2019 6/12/2018    Override on 6/14/2016: Done ([facility])    DEXA SCAN 07/03/2022 7/3/2017 (Done)    Override on 7/3/2017: Done    Override on 6/7/2010: Done (Recheck 5 years)    TETANUS VACCINE 01/24/2027 1/24/2017 (Done)    Override on 1/24/2017: Done          Screening - yes  History of polyps - no  Diarrhea - no  Anemia - no  Blood in stools - no  Abdominal pain - no  Other - no    ROS:  CONSTITUTIONAL: Denies weight change,  fatigue, fevers, chills, night sweats.  CARDIOVASCULAR: Denies chest pain, shortness of breath, orthopnea and edema.  RESPIRATORY: Denies cough, hemoptysis, dyspnea, and wheezing.  GI: See HPI.    Medical History:   Past Medical History:   Diagnosis Date    Abnormal Pap smear     Anxiety     Dr. Bose    Breast disorder     Pain in left breast    Family history of colonic polyps 10/18/2018    Her brother and sister have both had colon polyps.     Hyperlipidemia     Hypertension        Surgical History:   Past Surgical History:   Procedure Laterality Date    HYSTERECTOMY      KNEE SURGERY      TOTAL ABDOMINAL HYSTERECTOMY W/ BILATERAL SALPINGOOPHORECTOMY         Family History:   Family History   Problem Relation Age of Onset    Diabetes Mother     Glaucoma Mother     Hypertension Father     Heart disease Father     Breast cancer Maternal Aunt     Diabetes Sister     Glaucoma Sister     Lymphoma Sister     Diabetes Brother        Social History:   Social History     Tobacco Use    Smoking status: Never Smoker    Smokeless tobacco: Never Used   Substance Use Topics    Alcohol use: Yes     Comment: occ use    Drug use: No       Allergies:   Review of patient's allergies indicates:   Allergen Reactions    Bromocriptine      Other reaction(s): nightmares  Other reaction(s): anixety    Codeine      Other reaction(s): Headache    Morphine Rash       Medications:   No current facility-administered medications on file prior to encounter.      Current Outpatient Medications on File Prior to Encounter   Medication Sig Dispense Refill    clonazePAM (KLONOPIN) 0.5 MG tablet Take 1 tablet (0.5 mg total) by mouth every evening. 30 tablet 2    hydroCHLOROthiazide (HYDRODIURIL) 12.5 MG Tab Take 1 tablet (12.5 mg total) by mouth once daily. 30 tablet 11    pravastatin (PRAVACHOL) 80 MG tablet TAKE 1 TABLET(80 MG) BY MOUTH EVERY DAY 30 tablet 6    sodium,potassium,mag sulfates (SUPREP BOWEL PREP KIT) 17.5-3.13-1.6 gram SolR As directed 354 mL 0       Physical Exam:  Vital Signs:   Vitals:    10/18/18 1037   BP: (!) 148/79   Pulse:    Resp:    Temp:      General Appearance: Well appearing in no acute distress  ENT: OP clear  Chest: CTA B  CV: RRR, no m/r/g  Abd: s/nt/nd/nabs  Ext: no edema    Labs:Reviewed    IMP:  Active Hospital Problems    Diagnosis  POA    *Colon cancer screening [Z12.11]  Not Applicable    Family history of colonic polyps [Z83.71]   Not Applicable     Her brother and sister have both had colon polyps.        Resolved Hospital Problems   No resolved problems to display.         Plan:   I have explained the risks and benefits of colonoscopy to the patient including but not limited to bleeding, perforation, infection, and death. The patient wishes to proceed.

## 2018-10-18 NOTE — TRANSFER OF CARE
"Anesthesia Transfer of Care Note    Patient: Kailey Stokes    Procedure(s) Performed: Procedure(s) (LRB):  COLONOSCOPY (N/A)    Patient location: PACU    Anesthesia Type: MAC    Transport from OR: Transported from OR on room air with adequate spontaneous ventilation    Post pain: adequate analgesia    Post assessment: no apparent anesthetic complications    Post vital signs: stable    Level of consciousness: awake    Nausea/Vomiting: no nausea/vomiting    Complications: none    Transfer of care protocol was followed      Last vitals:   Visit Vitals  BP (!) 148/79   Pulse 86   Temp 36.7 °C (98.1 °F) (Oral)   Resp 16   Ht 5' 9" (1.753 m)   Wt 90.7 kg (200 lb)   SpO2 98%   Breastfeeding? No   BMI 29.53 kg/m²     "

## 2018-10-18 NOTE — DISCHARGE SUMMARY
Endoscopy Discharge Summary      Admit Date: 10/18/2018    Discharge Date and Time:  10/18/2018 11:14 AM    Attending Physician: Jayjay Grier MD     Discharge Physician: Jayjay Grier MD     Principal Admitting Diagnoses: Colon cancer screening         Discharge Diagnosis: The primary encounter diagnosis was Colon cancer screening. Diagnoses of Special screening for malignant neoplasms, colon, Family history of colonic polyps, and Polyp of colon, unspecified part of colon, unspecified type were also pertinent to this visit.     Discharged Condition: Good    Indication for Admission: Colon cancer screening     Hospital Course: Patient was admitted for an inpatient procedure and tolerated the procedure well with no complications.    Significant Diagnostic Studies: Colonoscopy with cold biopsy polypectomy    Pathology (if any):  Specimen (12h ago, onward)    Start     Ordered    10/18/18 1104  Specimen to Pathology - Surgery  Once     Comments:  1. Ascending colon polyp     Start Status   10/18/18 1104 Collected (10/18/18 1111)       10/18/18 1111          Estimated Blood Loss: 1 ml.    Discussed with: patient and family.    Disposition: Home.    Follow Up/Patient Instructions:   Current Discharge Medication List      CONTINUE these medications which have NOT CHANGED    Details   clonazePAM (KLONOPIN) 0.5 MG tablet Take 1 tablet (0.5 mg total) by mouth every evening.  Qty: 30 tablet, Refills: 2      estradiol (ESTRACE) 0.5 MG tablet Take 1 tablet (0.5 mg total) by mouth once daily.  Qty: 30 tablet, Refills: 0    Associated Diagnoses: Hormone replacement therapy      gabapentin (NEURONTIN) 300 MG capsule Take 1 capsule by mouth.      hydroCHLOROthiazide (HYDRODIURIL) 12.5 MG Tab Take 1 tablet (12.5 mg total) by mouth once daily.  Qty: 30 tablet, Refills: 11      pantoprazole (PROTONIX) 40 MG tablet TAKE 1 TABLET(40 MG) BY MOUTH EVERY DAY  Qty: 30 tablet, Refills: 3      pravastatin (PRAVACHOL) 80 MG tablet TAKE  1 TABLET(80 MG) BY MOUTH EVERY DAY  Qty: 30 tablet, Refills: 6      sodium,potassium,mag sulfates (SUPREP BOWEL PREP KIT) 17.5-3.13-1.6 gram SolR As directed  Qty: 354 mL, Refills: 0             Discharge Procedure Orders   Diet general     Call MD for:  temperature >100.4     Call MD for:  persistent nausea and vomiting     Call MD for:  severe uncontrolled pain     Call MD for:  difficulty breathing, headache or visual disturbances     Call MD for:  redness, tenderness, or signs of infection (pain, swelling, redness, odor or green/yellow discharge around incision site)     Call MD for:  hives     Call MD for:  persistent dizziness or light-headedness     No dressing needed       Follow-up Information     Jayjay Grier MD. Call in 1 week.    Specialty:  Family Medicine  Why:  To receive pathology results.  Contact information:  [id number] [facility] [address] [phone]

## 2018-10-18 NOTE — [employer] PATIENT INSTRUCTIONS
Discharge Summary/Instructions after an Endoscopic Procedure  Patient Name: Kailey Stokes  Patient MRN: [id number]  Patient YOB: 1950 Thursday, October 18, 2018 Jayjay Grier MD  RESTRICTIONS:  During your procedure today, you received medications for sedation.  These   medications may affect your judgment, balance and coordination.  Therefore,   for 24 hours, you have the following restrictions:   - DO NOT drive a car, operate machinery, make legal/financial decisions,   sign important papers or drink alcohol.    ACTIVITY:  Today: no heavy lifting, straining or running due to procedural   sedation/anesthesia.  The following day: return to full activity including work.  DIET:  Eat and drink normally unless instructed otherwise.     TREATMENT FOR COMMON SIDE EFFECTS:  - Mild abdominal pain, nausea, belching, bloating or excessive gas:  rest,   eat lightly and use a heating pad.  - Sore Throat: treat with throat lozenges and/or gargle with warm salt   water.  - Because air was used during the procedure, expelling large amounts of air   from your rectum or belching is normal.  - If a bowel prep was taken, you may not have a bowel movement for 1-3 days.    This is normal.  SYMPTOMS TO WATCH FOR AND REPORT TO YOUR PHYSICIAN:  1. Abdominal pain or bloating, other than gas cramps.  2. Chest pain.  3. Back pain.  4. Signs of infection such as: chills or fever occurring within 24 hours   after the procedure.  5. Rectal bleeding, which would show as bright red, maroon, or black stools.   (A tablespoon of blood from the rectum is not serious, especially if   hemorrhoids are present.)  6. Vomiting.  7. Weakness or dizziness.  GO DIRECTLY TO THE NEAREST EMERGENCY ROOM IF YOU HAVE ANY OF THE FOLLOWING:      Difficulty breathing              Chills and/or fever over 101 F   Persistent vomiting and/or vomiting blood   Severe abdominal pain   Severe chest pain   Black, tarry stools   Bleeding- more than one  tablespoon   Any other symptom or condition that you feel may need urgent attention  Your doctor recommends these additional instructions:  If any biopsies were taken, your doctors clinic will contact you in 1 to 2   weeks with any results.  - Patient has a contact number available for emergencies.  The signs and   symptoms of potential delayed complications were discussed with the   patient.  Return to normal activities tomorrow.  Written discharge   instructions were provided to the patient.   - Resume previous diet.   - Continue present medications.   - Await pathology results.   - Repeat colonoscopy in 5 years for surveillance.   - Telephone my office for pathology results in 1 week.   - Discharge patient to home (via wheelchair).  For questions, problems or results please call your physician Jayjay Grier MD at Work:  [phone]  If you have any questions about the above instructions, call the GI   department at [phone] or call the endoscopy unit at [phone]   from 7am until 3 pm.  [facility], EMERGENCY ROOM PHONE NUMBER:   [phone]  IF A COMPLICATION OR EMERGENCY SITUATION ARISES AND YOU ARE UNABLE TO REACH   YOUR PHYSICIAN - GO DIRECTLY TO THE EMERGENCY ROOM.  I have read or have had read to me these discharge instructions for my   procedure and have received a written copy.  I understand these   instructions and will follow-up with my physician if I have any questions.     __________________________________       _____________________________________  Nurse Signature                                          Patient/Designated   Responsible Party Signature  Jayjay Grier MD  10/18/2018 11:12:53 AM  This report has been verified and signed electronically.  [employer]

## 2018-10-29 NOTE — PROGRESS NOTES
Dear Karel Flowers MD,    I recently cared for Kailey Stokes and performed an endoscopy.  Tissue was sent for pathology evaluation and I will have a letter written to ask the patient to repeat the colonoscopy in 5 years.  The pathology showed that there was adenomatous tissue present.  Thank you for allowing me to participate in the care of your patient.  Please call me for any questions that you might have.      Dr. Jayjay Grier  [phone] cell  [phone] office      NURSING STAFF:Please  inform the patient that I reviewed the recent pathology obtained at the time of colonoscopy.    The results showed that there was adenomatous tissue present which is benign and based on that, I recommend that the patient have a repeat colonoscopy performed in 5 years.     If the patient has MyChart, this message has been sent to them.  Confirm that they read the note.  If not, copy the information and print a letter to send to the patient at this time.  Confirm that a notation to the PCP was done.      Dear Kailey Stokes,    This is to inform you that I have reviewed your recent colonoscopy pathology.  The results showed that you had adenomatous tissue present which is benign and based on that, I recommend that you have a repeat colonoscopy performed in 5 years.      Dr. Jayjay Grier  [phone]

## 2018-11-09 ENCOUNTER — IMMUNIZATION (OUTPATIENT)
Dept: PHARMACY | Facility: CLINIC | Age: 68
End: 2018-11-09
Payer: MEDICARE

## 2018-11-16 ENCOUNTER — OFFICE VISIT (OUTPATIENT)
Dept: INTERNAL MEDICINE | Facility: CLINIC | Age: 68
End: 2018-11-16
Payer: MEDICARE

## 2018-11-16 VITALS
DIASTOLIC BLOOD PRESSURE: 80 MMHG | SYSTOLIC BLOOD PRESSURE: 112 MMHG | HEIGHT: 69 IN | TEMPERATURE: 97 F | HEART RATE: 78 BPM | BODY MASS INDEX: 30.1 KG/M2 | WEIGHT: 203.25 LBS

## 2018-11-16 DIAGNOSIS — M50.20 CERVICAL DISC HERNIATION: ICD-10-CM

## 2018-11-16 DIAGNOSIS — F41.9 ANXIETY: Primary | ICD-10-CM

## 2018-11-16 DIAGNOSIS — M47.22 OSTEOARTHRITIS OF SPINE WITH RADICULOPATHY, CERVICAL REGION: ICD-10-CM

## 2018-11-16 PROCEDURE — 99213 OFFICE O/P EST LOW 20 MIN: CPT | Mod: 25,HCNC,S$GLB, | Performed by: FAMILY MEDICINE

## 2018-11-16 PROCEDURE — 3074F SYST BP LT 130 MM HG: CPT | Mod: CPTII,HCNC,S$GLB, | Performed by: FAMILY MEDICINE

## 2018-11-16 PROCEDURE — G0009 ADMIN PNEUMOCOCCAL VACCINE: HCPCS | Mod: HCNC,S$GLB,, | Performed by: FAMILY MEDICINE

## 2018-11-16 PROCEDURE — 99999 PR PBB SHADOW E&M-EST. PATIENT-LVL III: CPT | Mod: PBBFAC,HCNC,, | Performed by: FAMILY MEDICINE

## 2018-11-16 PROCEDURE — 1101F PT FALLS ASSESS-DOCD LE1/YR: CPT | Mod: CPTII,HCNC,S$GLB, | Performed by: FAMILY MEDICINE

## 2018-11-16 PROCEDURE — 90732 PPSV23 VACC 2 YRS+ SUBQ/IM: CPT | Mod: HCNC,S$GLB,, | Performed by: FAMILY MEDICINE

## 2018-11-16 PROCEDURE — 3079F DIAST BP 80-89 MM HG: CPT | Mod: CPTII,HCNC,S$GLB, | Performed by: FAMILY MEDICINE

## 2018-11-16 RX ORDER — MELOXICAM 7.5 MG/1
TABLET ORAL
Qty: 90 TABLET | Refills: 1 | OUTPATIENT
Start: 2018-11-16

## 2018-11-16 RX ORDER — MELOXICAM 7.5 MG/1
7.5 TABLET ORAL DAILY PRN
Qty: 30 TABLET | Refills: 1 | Status: SHIPPED | OUTPATIENT
Start: 2018-11-16 | End: 2019-01-10

## 2018-11-16 NOTE — PROGRESS NOTES
"Subjective:      Patient ID: Kailey Stokes is a 68 y.o. female.    Chief Complaint: Follow-up (3 mo )    HPI  67 yo female here for f/u on anxiety.  She is doing fine using her clonazepam nightly.  Anxiety improving.  Seeing Dr. Alex for her neck.  Saw Lucy, had US done supraclavicular area for swelling.  No abnormalities.  Her clavicle on the R side is more pronounced.  No pain.  She saw Gyn recently, having some bladder pain.  Urine bright yellow.  Waiting to hear to hear back on urine tests.  No dysuria/hematuria.  Not drinking enough water, maybe 3 8 oz glasses of water daily.    Past Medical History:   Diagnosis Date    Abnormal Pap smear     Anxiety     Dr. Bose    Breast disorder     Pain in left breast    Family history of colonic polyps 10/18/2018    Her brother and sister have both had colon polyps.    Hyperlipidemia     Hypertension      Family History   Problem Relation Age of Onset    Diabetes Mother     Glaucoma Mother     Hypertension Father     Heart disease Father     Breast cancer Maternal Aunt     Diabetes Sister     Glaucoma Sister     Lymphoma Sister     Diabetes Brother      Past Surgical History:   Procedure Laterality Date    COLONOSCOPY N/A 10/18/2018    Procedure: COLONOSCOPY;  Surgeon: Jayjay Grier MD;  Location: [facility] ENDO;  Service: Endoscopy;  Laterality: N/A;    COLONOSCOPY N/A 10/18/2018    Performed by Jayjay Grier MD at [facility] ENDO    HYSTERECTOMY      KNEE SURGERY      TOTAL ABDOMINAL HYSTERECTOMY W/ BILATERAL SALPINGOOPHORECTOMY       Social History     Tobacco Use    Smoking status: Never Smoker    Smokeless tobacco: Never Used   Substance Use Topics    Alcohol use: Yes     Comment: occ use    Drug use: No       /80 (BP Location: Left arm, Patient Position: Sitting, BP Method: X-Large (Manual))   Pulse 78   Temp 96.7 °F (35.9 °C) (Tympanic)   Ht 5' 9" (1.753 m)   Wt 92.2 kg (203 lb 4.2 oz)   BMI 30.02 kg/m²     Review of Systems "   Respiratory: Negative for shortness of breath.    Gastrointestinal: Negative for abdominal distention.   Musculoskeletal: Positive for neck pain.   Psychiatric/Behavioral: The patient is not nervous/anxious.        Objective:     Physical Exam   Constitutional: She is oriented to person, place, and time. She appears well-developed and well-nourished.   Cardiovascular: Normal rate, regular rhythm and normal heart sounds.   Pulmonary/Chest: Effort normal and breath sounds normal. No stridor. No respiratory distress.   Musculoskeletal: She exhibits no edema.        Arms:  Right clavicle more pronounced than L side.  No pain on palpation.  No swelling noted   Neurological: She is alert and oriented to person, place, and time.   Skin: Skin is warm and dry.   Psychiatric: She has a normal mood and affect. Her behavior is normal. Judgment and thought content normal.   Nursing note and vitals reviewed.      Lab Results   Component Value Date    WBC 5.83 09/19/2018    HGB 13.5 09/19/2018    HCT 39.7 09/19/2018     09/19/2018    CHOL 243 (H) 06/12/2018    TRIG 234 (H) 06/12/2018    HDL 52 06/12/2018    ALT 11 06/12/2018    AST 19 06/12/2018     06/12/2018    K 4.5 06/12/2018     06/12/2018    CREATININE 1.0 06/12/2018    BUN 11 06/12/2018    CO2 28 06/12/2018    TSH 1.739 06/12/2018    INR 1.0 02/08/2008    HGBA1C 5.6 01/26/2017       Assessment:     1. Anxiety    2. Cervical disc herniation    3. Osteoarthritis of spine with radiculopathy, cervical region         Plan:     Anxiety    Cervical disc herniation    Osteoarthritis of spine with radiculopathy, cervical region    Other orders  -     meloxicam (MOBIC) 7.5 MG tablet; Take 1 tablet (7.5 mg total) by mouth daily as needed for Pain.  Dispense: 30 tablet; Refill: 1  -     (In Office Administered) Pneumococcal Polysaccharide Vaccine (23 Valent) (SQ/IM)    Anxiety stable  Trial of mobic to use PRN, low dose.  Avoid OTC NSAIDs  Clavicle is more  bony/arthritic type changes  Pneumo 23 today  Follow-up in about 7 months (around 6/3/2019).

## 2018-12-11 ENCOUNTER — PATIENT MESSAGE (OUTPATIENT)
Dept: INTERNAL MEDICINE | Facility: CLINIC | Age: 68
End: 2018-12-11

## 2018-12-11 ENCOUNTER — TELEPHONE (OUTPATIENT)
Dept: INTERNAL MEDICINE | Facility: CLINIC | Age: 68
End: 2018-12-11

## 2018-12-11 NOTE — TELEPHONE ENCOUNTER
Pt sent request for an appt saying       ----- Message from [facility], System Message sent at 12/11/2018  1:19 PM CST -----      Yes, still having cornelius urine. Saw gynecologist and bladder was sensitive to touch. Urine test all come back ok so what next thing to do. Maybe referral to urologist, I dont know or should I just ignore it?        Do you want to do referral or have her see Lucy?

## 2018-12-11 NOTE — TELEPHONE ENCOUNTER
If there is pain then seeing Urology would be an option.  Urine being more concentrated can be darker in color, so certainly be sure to be drinking plenty of water.

## 2018-12-12 ENCOUNTER — TELEPHONE (OUTPATIENT)
Dept: UROLOGY | Facility: CLINIC | Age: 68
End: 2018-12-12

## 2018-12-12 ENCOUNTER — PATIENT MESSAGE (OUTPATIENT)
Dept: INTERNAL MEDICINE | Facility: CLINIC | Age: 68
End: 2018-12-12

## 2018-12-12 NOTE — TELEPHONE ENCOUNTER
----- Message from Becky Moise sent at 12/12/2018  2:58 PM CST -----  Contact: self- [phone]  Returning call. Please call back at. [phone]. Thanks eris.

## 2018-12-12 NOTE — TELEPHONE ENCOUNTER
Did you want her to see urologist and place referral for here in [place]/[facility]?  Or see Lucy?

## 2018-12-13 NOTE — TELEPHONE ENCOUNTER
She can see Urology in this area.  If they accept [employer] she shouldn't need referral.  Can see Dr. Oretga

## 2019-01-04 RX ORDER — CLONAZEPAM 0.5 MG/1
TABLET ORAL
Qty: 30 TABLET | Refills: 0 | Status: SHIPPED | OUTPATIENT
Start: 2019-01-04 | End: 2019-03-07 | Stop reason: SDUPTHER

## 2019-01-10 ENCOUNTER — OFFICE VISIT (OUTPATIENT)
Dept: INTERNAL MEDICINE | Facility: CLINIC | Age: 69
End: 2019-01-10
Payer: MEDICARE

## 2019-01-10 ENCOUNTER — LAB VISIT (OUTPATIENT)
Dept: LAB | Facility: HOSPITAL | Age: 69
End: 2019-01-10
Attending: FAMILY MEDICINE
Payer: MEDICARE

## 2019-01-10 VITALS
WEIGHT: 202.63 LBS | BODY MASS INDEX: 30.01 KG/M2 | SYSTOLIC BLOOD PRESSURE: 100 MMHG | HEART RATE: 70 BPM | HEIGHT: 69 IN | DIASTOLIC BLOOD PRESSURE: 70 MMHG | TEMPERATURE: 99 F

## 2019-01-10 DIAGNOSIS — M79.2 NEURALGIA: ICD-10-CM

## 2019-01-10 DIAGNOSIS — R82.90 ABNORMAL URINE: ICD-10-CM

## 2019-01-10 DIAGNOSIS — F41.9 ANXIETY: ICD-10-CM

## 2019-01-10 DIAGNOSIS — M79.2 NEURALGIA: Primary | ICD-10-CM

## 2019-01-10 DIAGNOSIS — I10 ESSENTIAL HYPERTENSION: ICD-10-CM

## 2019-01-10 LAB
BILIRUB UR QL STRIP: NEGATIVE
CLARITY UR REFRACT.AUTO: ABNORMAL
COLOR UR AUTO: YELLOW
GLUCOSE UR QL STRIP: NEGATIVE
HGB UR QL STRIP: NEGATIVE
KETONES UR QL STRIP: NEGATIVE
LEUKOCYTE ESTERASE UR QL STRIP: NEGATIVE
MICROSCOPIC COMMENT: ABNORMAL
NITRITE UR QL STRIP: NEGATIVE
PH UR STRIP: 6 [PH] (ref 5–8)
PROT UR QL STRIP: NEGATIVE
RBC #/AREA URNS AUTO: 0 /HPF (ref 0–4)
SP GR UR STRIP: 1.02 (ref 1–1.03)
SQUAMOUS #/AREA URNS AUTO: 11 /HPF
URN SPEC COLLECT METH UR: ABNORMAL
VIT B12 SERPL-MCNC: 460 PG/ML
WBC #/AREA URNS AUTO: 0 /HPF (ref 0–5)
YEAST UR QL AUTO: ABNORMAL

## 2019-01-10 PROCEDURE — 99214 PR OFFICE/OUTPT VISIT, EST, LEVL IV, 30-39 MIN: ICD-10-PCS | Mod: HCNC,S$GLB,, | Performed by: FAMILY MEDICINE

## 2019-01-10 PROCEDURE — 99214 OFFICE O/P EST MOD 30 MIN: CPT | Mod: HCNC,S$GLB,, | Performed by: FAMILY MEDICINE

## 2019-01-10 PROCEDURE — 99999 PR PBB SHADOW E&M-EST. PATIENT-LVL III: CPT | Mod: PBBFAC,HCNC,, | Performed by: FAMILY MEDICINE

## 2019-01-10 PROCEDURE — 3078F PR MOST RECENT DIASTOLIC BLOOD PRESSURE < 80 MM HG: ICD-10-PCS | Mod: CPTII,HCNC,S$GLB, | Performed by: FAMILY MEDICINE

## 2019-01-10 PROCEDURE — 83036 HEMOGLOBIN GLYCOSYLATED A1C: CPT | Mod: HCNC

## 2019-01-10 PROCEDURE — 1101F PT FALLS ASSESS-DOCD LE1/YR: CPT | Mod: CPTII,HCNC,S$GLB, | Performed by: FAMILY MEDICINE

## 2019-01-10 PROCEDURE — 82607 VITAMIN B-12: CPT | Mod: HCNC

## 2019-01-10 PROCEDURE — 99999 PR PBB SHADOW E&M-EST. PATIENT-LVL III: ICD-10-PCS | Mod: PBBFAC,HCNC,, | Performed by: FAMILY MEDICINE

## 2019-01-10 PROCEDURE — 3074F PR MOST RECENT SYSTOLIC BLOOD PRESSURE < 130 MM HG: ICD-10-PCS | Mod: CPTII,HCNC,S$GLB, | Performed by: FAMILY MEDICINE

## 2019-01-10 PROCEDURE — 3074F SYST BP LT 130 MM HG: CPT | Mod: CPTII,HCNC,S$GLB, | Performed by: FAMILY MEDICINE

## 2019-01-10 PROCEDURE — 1101F PR PT FALLS ASSESS DOC 0-1 FALLS W/OUT INJ PAST YR: ICD-10-PCS | Mod: CPTII,HCNC,S$GLB, | Performed by: FAMILY MEDICINE

## 2019-01-10 PROCEDURE — 3078F DIAST BP <80 MM HG: CPT | Mod: CPTII,HCNC,S$GLB, | Performed by: FAMILY MEDICINE

## 2019-01-10 PROCEDURE — 36415 COLL VENOUS BLD VENIPUNCTURE: CPT | Mod: HCNC,PO

## 2019-01-10 PROCEDURE — 81001 URINALYSIS AUTO W/SCOPE: CPT | Mod: HCNC

## 2019-01-10 RX ORDER — AMLODIPINE BESYLATE 2.5 MG/1
2.5 TABLET ORAL DAILY
Qty: 30 TABLET | Refills: 11 | Status: SHIPPED | OUTPATIENT
Start: 2019-01-10 | End: 2020-01-14

## 2019-01-10 NOTE — PROGRESS NOTES
"Subjective:      Patient ID: Kailey Stokes is a 68 y.o. female.    Chief Complaint: disucss urinary issues and Leg Pain (burning in legs and feet )    HPI  69 yo female here to discuss a few things.  Her anxiety is up, she lost 2 more sisters from DM associated complications.  She has the burning sensation off/on in feet/legs and just wants to be sure it is not DM.  Afraid of the fam hx.  Her urine remains a cornelius color since around Aug.   She saw us, normal urine test.  Saw her Gyn, was tender on exam and advised to see Uro.  Saw Uro, neg testing.  Is on cranberry supplements.  Will occ get burning after urination, not during.  Only change around time of urinary changes was addition of the HCTZ.    Past Medical History:   Diagnosis Date    Abnormal Pap smear     Anxiety     Dr. Bose    Breast disorder     Pain in left breast    Family history of colonic polyps 10/18/2018    Her brother and sister have both had colon polyps.    Hyperlipidemia     Hypertension      Family History   Problem Relation Age of Onset    Diabetes Mother     Glaucoma Mother     Hypertension Father     Heart disease Father     Breast cancer Maternal Aunt     Diabetes Sister     Glaucoma Sister     Lymphoma Sister     Diabetes Brother      Past Surgical History:   Procedure Laterality Date    COLONOSCOPY N/A 10/18/2018    Performed by Jayjay Grier [facility] at [facility]    HYSTERECTOMY      KNEE SURGERY      TOTAL ABDOMINAL HYSTERECTOMY W/ BILATERAL SALPINGOOPHORECTOMY       Social History     Tobacco Use    Smoking status: Never Smoker    Smokeless tobacco: Never Used   Substance Use Topics    Alcohol use: Yes     Comment: occ use    Drug use: No       /70 (BP Location: Left arm, Patient Position: Sitting, BP Method: X-Large (Manual))   Pulse 70   Temp 98.7 °F (37.1 °C) (Oral)   Ht 5' 9" (1.753 m)   Wt 91.9 kg (202 lb 9.6 oz)   BMI 29.92 kg/m²     Review of Systems   Constitutional: Positive for activity " change.        Exercising more   Respiratory: Negative for cough.    Cardiovascular: Negative for chest pain.   Genitourinary: Negative for dysuria, flank pain, frequency and hematuria.       Objective:     Physical Exam   Constitutional: She is oriented to person, place, and time. She appears well-developed and well-nourished.   HENT:   Mouth/Throat: Oropharynx is clear and moist.   Cardiovascular: Normal rate, regular rhythm and normal heart sounds.   Pulmonary/Chest: Effort normal and breath sounds normal. No stridor. No respiratory distress.   Abdominal: Soft. Bowel sounds are normal. She exhibits no distension. There is no tenderness.   Neurological: She is alert and oriented to person, place, and time.   Skin: Skin is warm and dry.   Psychiatric: She has a normal mood and affect. Her behavior is normal. Judgment and thought content normal.   Nursing note and vitals reviewed.      Lab Results   Component Value Date    WBC 5.83 09/19/2018    HGB 13.5 09/19/2018    HCT 39.7 09/19/2018     09/19/2018    CHOL 243 (H) 06/12/2018    TRIG 234 (H) 06/12/2018    HDL 52 06/12/2018    ALT 11 06/12/2018    AST 19 06/12/2018     06/12/2018    K 4.5 06/12/2018     06/12/2018    CREATININE 1.0 06/12/2018    BUN 11 06/12/2018    CO2 28 06/12/2018    TSH 1.739 06/12/2018    INR 1.0 02/08/2008    HGBA1C 5.6 01/26/2017       Assessment:     1. Neuralgia    2. Essential hypertension    3. Abnormal urine    4. Anxiety         Plan:     Neuralgia  -     Vitamin B12; Future; Expected date: 01/10/2019  -     Hemoglobin A1c; Future; Expected date: 01/10/2019    Essential hypertension    Abnormal urine  -     URINALYSIS  -     Hemoglobin A1c; Future; Expected date: 01/10/2019    Anxiety    Other orders  -     amLODIPine (NORVASC) 2.5 MG tablet; Take 1 tablet (2.5 mg total) by mouth once daily.  Dispense: 30 tablet; Refill: 11    Reviewed Uro note with pt and negative screening tests done on urine.  Pt reports Gyn was  negative for findings    UA sent   Stop the HCTZ and start norvasc 2.5mg and see if urine improves.  Cont with 64 oz water daily.  Will do screening A1C and B12 as pt is concerned with losing 2 sisters last month, 4 wks apart.  Cont with healthy eating and exercise  F/u 6 mos

## 2019-01-11 LAB
ESTIMATED AVG GLUCOSE: 117 MG/DL
HBA1C MFR BLD HPLC: 5.7 %

## 2019-03-08 RX ORDER — CLONAZEPAM 0.5 MG/1
0.5 TABLET ORAL DAILY PRN
Qty: 30 TABLET | Refills: 0 | Status: SHIPPED | OUTPATIENT
Start: 2019-03-08 | End: 2019-04-23 | Stop reason: SDUPTHER

## 2019-04-24 RX ORDER — CLONAZEPAM 0.5 MG/1
TABLET ORAL
Qty: 30 TABLET | Refills: 0 | Status: SHIPPED | OUTPATIENT
Start: 2019-04-24 | End: 2019-06-18 | Stop reason: SDUPTHER

## 2019-05-20 RX ORDER — GABAPENTIN 300 MG/1
CAPSULE ORAL
Qty: 30 CAPSULE | Refills: 3 | Status: SHIPPED | OUTPATIENT
Start: 2019-05-20 | End: 2021-02-18

## 2019-05-20 RX ORDER — PRAVASTATIN SODIUM 80 MG/1
TABLET ORAL
Qty: 90 TABLET | Refills: 6 | Status: SHIPPED | OUTPATIENT
Start: 2019-05-20 | End: 2020-07-29

## 2019-05-27 RX ORDER — PANTOPRAZOLE SODIUM 40 MG/1
TABLET, DELAYED RELEASE ORAL
Qty: 90 TABLET | Refills: 3 | Status: SHIPPED | OUTPATIENT
Start: 2019-05-27 | End: 2020-07-22

## 2019-06-19 RX ORDER — CLONAZEPAM 0.5 MG/1
TABLET ORAL
Qty: 30 TABLET | Refills: 0 | Status: SHIPPED | OUTPATIENT
Start: 2019-06-19 | End: 2019-08-09 | Stop reason: SDUPTHER

## 2019-06-25 ENCOUNTER — LAB VISIT (OUTPATIENT)
Dept: LAB | Facility: HOSPITAL | Age: 69
End: 2019-06-25
Attending: FAMILY MEDICINE
Payer: MEDICARE

## 2019-06-25 ENCOUNTER — OFFICE VISIT (OUTPATIENT)
Dept: INTERNAL MEDICINE | Facility: CLINIC | Age: 69
End: 2019-06-25
Payer: MEDICARE

## 2019-06-25 VITALS
WEIGHT: 207.44 LBS | HEIGHT: 69 IN | TEMPERATURE: 97 F | RESPIRATION RATE: 16 BRPM | HEART RATE: 96 BPM | DIASTOLIC BLOOD PRESSURE: 88 MMHG | BODY MASS INDEX: 30.72 KG/M2 | SYSTOLIC BLOOD PRESSURE: 130 MMHG

## 2019-06-25 DIAGNOSIS — I10 ESSENTIAL HYPERTENSION: ICD-10-CM

## 2019-06-25 DIAGNOSIS — E55.9 VITAMIN D DEFICIENCY: ICD-10-CM

## 2019-06-25 DIAGNOSIS — E78.5 HYPERLIPIDEMIA, UNSPECIFIED HYPERLIPIDEMIA TYPE: ICD-10-CM

## 2019-06-25 DIAGNOSIS — Z00.00 ANNUAL PHYSICAL EXAM: Primary | ICD-10-CM

## 2019-06-25 DIAGNOSIS — R73.09 ABNORMAL GLUCOSE: ICD-10-CM

## 2019-06-25 DIAGNOSIS — E66.9 OBESITY, UNSPECIFIED CLASSIFICATION, UNSPECIFIED OBESITY TYPE, UNSPECIFIED WHETHER SERIOUS COMORBIDITY PRESENT: ICD-10-CM

## 2019-06-25 LAB
25(OH)D3+25(OH)D2 SERPL-MCNC: 14 NG/ML (ref 30–96)
ALBUMIN SERPL BCP-MCNC: 4 G/DL (ref 3.5–5.2)
ALP SERPL-CCNC: 85 U/L (ref 55–135)
ALT SERPL W/O P-5'-P-CCNC: 12 U/L (ref 10–44)
ANION GAP SERPL CALC-SCNC: 11 MMOL/L (ref 8–16)
AST SERPL-CCNC: 19 U/L (ref 10–40)
BASOPHILS # BLD AUTO: 0.04 K/UL (ref 0–0.2)
BASOPHILS NFR BLD: 0.7 % (ref 0–1.9)
BILIRUB SERPL-MCNC: 0.5 MG/DL (ref 0.1–1)
BUN SERPL-MCNC: 8 MG/DL (ref 8–23)
CALCIUM SERPL-MCNC: 9.7 MG/DL (ref 8.7–10.5)
CHLORIDE SERPL-SCNC: 107 MMOL/L (ref 95–110)
CHOLEST SERPL-MCNC: 233 MG/DL (ref 120–199)
CHOLEST/HDLC SERPL: 4.2 {RATIO} (ref 2–5)
CO2 SERPL-SCNC: 24 MMOL/L (ref 23–29)
CREAT SERPL-MCNC: 0.9 MG/DL (ref 0.5–1.4)
DIFFERENTIAL METHOD: NORMAL
EOSINOPHIL # BLD AUTO: 0.1 K/UL (ref 0–0.5)
EOSINOPHIL NFR BLD: 2.1 % (ref 0–8)
ERYTHROCYTE [DISTWIDTH] IN BLOOD BY AUTOMATED COUNT: 13.7 % (ref 11.5–14.5)
EST. GFR  (AFRICAN AMERICAN): >60 ML/MIN/1.73 M^2
EST. GFR  (NON AFRICAN AMERICAN): >60 ML/MIN/1.73 M^2
GLUCOSE SERPL-MCNC: 83 MG/DL (ref 70–110)
HCT VFR BLD AUTO: 39.2 % (ref 37–48.5)
HDLC SERPL-MCNC: 55 MG/DL (ref 40–75)
HDLC SERPL: 23.6 % (ref 20–50)
HGB BLD-MCNC: 13.4 G/DL (ref 12–16)
IMM GRANULOCYTES # BLD AUTO: 0.01 K/UL (ref 0–0.04)
IMM GRANULOCYTES NFR BLD AUTO: 0.2 % (ref 0–0.5)
LDLC SERPL CALC-MCNC: 140.6 MG/DL (ref 63–159)
LYMPHOCYTES # BLD AUTO: 1.9 K/UL (ref 1–4.8)
LYMPHOCYTES NFR BLD: 33.6 % (ref 18–48)
MCH RBC QN AUTO: 29.2 PG (ref 27–31)
MCHC RBC AUTO-ENTMCNC: 34.2 G/DL (ref 32–36)
MCV RBC AUTO: 85 FL (ref 82–98)
MONOCYTES # BLD AUTO: 0.4 K/UL (ref 0.3–1)
MONOCYTES NFR BLD: 7.3 % (ref 4–15)
NEUTROPHILS # BLD AUTO: 3.1 K/UL (ref 1.8–7.7)
NEUTROPHILS NFR BLD: 56.1 % (ref 38–73)
NONHDLC SERPL-MCNC: 178 MG/DL
NRBC BLD-RTO: 0 /100 WBC
PLATELET # BLD AUTO: 272 K/UL (ref 150–350)
PMV BLD AUTO: 10.1 FL (ref 9.2–12.9)
POTASSIUM SERPL-SCNC: 3.9 MMOL/L (ref 3.5–5.1)
PROT SERPL-MCNC: 7.3 G/DL (ref 6–8.4)
RBC # BLD AUTO: 4.59 M/UL (ref 4–5.4)
SODIUM SERPL-SCNC: 142 MMOL/L (ref 136–145)
TRIGL SERPL-MCNC: 187 MG/DL (ref 30–150)
TSH SERPL DL<=0.005 MIU/L-ACNC: 1.41 UIU/ML (ref 0.4–4)
WBC # BLD AUTO: 5.59 K/UL (ref 3.9–12.7)

## 2019-06-25 PROCEDURE — 99397 PER PM REEVAL EST PAT 65+ YR: CPT | Mod: HCNC,S$GLB,, | Performed by: FAMILY MEDICINE

## 2019-06-25 PROCEDURE — 85025 COMPLETE CBC W/AUTO DIFF WBC: CPT | Mod: HCNC

## 2019-06-25 PROCEDURE — 83036 HEMOGLOBIN GLYCOSYLATED A1C: CPT | Mod: HCNC

## 2019-06-25 PROCEDURE — 3079F DIAST BP 80-89 MM HG: CPT | Mod: HCNC,CPTII,S$GLB, | Performed by: FAMILY MEDICINE

## 2019-06-25 PROCEDURE — 3075F PR MOST RECENT SYSTOLIC BLOOD PRESS GE 130-139MM HG: ICD-10-PCS | Mod: HCNC,CPTII,S$GLB, | Performed by: FAMILY MEDICINE

## 2019-06-25 PROCEDURE — 3075F SYST BP GE 130 - 139MM HG: CPT | Mod: HCNC,CPTII,S$GLB, | Performed by: FAMILY MEDICINE

## 2019-06-25 PROCEDURE — 80053 COMPREHEN METABOLIC PANEL: CPT | Mod: HCNC

## 2019-06-25 PROCEDURE — 80061 LIPID PANEL: CPT | Mod: HCNC

## 2019-06-25 PROCEDURE — 99999 PR PBB SHADOW E&M-EST. PATIENT-LVL III: CPT | Mod: PBBFAC,HCNC,, | Performed by: FAMILY MEDICINE

## 2019-06-25 PROCEDURE — 36415 COLL VENOUS BLD VENIPUNCTURE: CPT | Mod: HCNC,PO

## 2019-06-25 PROCEDURE — 3079F PR MOST RECENT DIASTOLIC BLOOD PRESSURE 80-89 MM HG: ICD-10-PCS | Mod: HCNC,CPTII,S$GLB, | Performed by: FAMILY MEDICINE

## 2019-06-25 PROCEDURE — 99999 PR PBB SHADOW E&M-EST. PATIENT-LVL III: ICD-10-PCS | Mod: PBBFAC,HCNC,, | Performed by: FAMILY MEDICINE

## 2019-06-25 PROCEDURE — 82306 VITAMIN D 25 HYDROXY: CPT | Mod: HCNC

## 2019-06-25 PROCEDURE — 99397 PR PREVENTIVE VISIT,EST,65 & OVER: ICD-10-PCS | Mod: HCNC,S$GLB,, | Performed by: FAMILY MEDICINE

## 2019-06-25 PROCEDURE — 84443 ASSAY THYROID STIM HORMONE: CPT | Mod: HCNC

## 2019-06-25 RX ORDER — ASPIRIN 81 MG/1
81 TABLET ORAL DAILY
COMMUNITY

## 2019-06-25 NOTE — PROGRESS NOTES
"Subjective:      Patient ID: Kailey Stokes is a 68 y.o. female.    Chief Complaint: Annual Exam    HPI  67 yo female with HTN, hyperlipidemia here for annual visit.  Saw Urology for her dark urine, work up neg.  Was given detrol, didn't take it due to cost.  Doing well.  Anxiety stable, using klonopin nightly for sleep PRN.  Trying to get on track with better exercise.  No CP/SOB  Bowels are stable    Past Medical History:   Diagnosis Date    Abnormal Pap smear     Anxiety     Dr. Bose    Breast disorder     Pain in left breast    Family history of colonic polyps 10/18/2018    Her brother and sister have both had colon polyps.    Hyperlipidemia     Hypertension      Family History   Problem Relation Age of Onset    Diabetes Mother     Glaucoma Mother     Hypertension Father     Heart disease Father     Breast cancer Maternal Aunt     Diabetes Sister     Glaucoma Sister     Lymphoma Sister     Diabetes Brother      Past Surgical History:   Procedure Laterality Date    COLONOSCOPY N/A 10/18/2018    Per[facility]    HYSTERECTOMY      KNEE SURGERY      TOTAL ABDOMINAL HYSTERECTOMY W/ BILATERAL SALPINGOOPHORECTOMY       Social History     Tobacco Use    Smoking status: Never Smoker    Smokeless tobacco: Never Used   Substance Use Topics    Alcohol use: Yes     Comment: occ use    Drug use: No       /88 (BP Location: Left arm, Patient Position: Sitting)   Pulse 96   Temp 97.2 °F (36.2 °C) (Tympanic)   Resp 16   Ht 5' 9.25" (1.759 m)   Wt 94.1 kg (207 lb 7.3 oz)   BMI 30.41 kg/m²     Review of Systems   Constitutional: Negative for activity change, appetite change, chills, diaphoresis, fatigue, fever and unexpected weight change.   HENT: Negative for ear pain, hearing loss, postnasal drip, rhinorrhea and tinnitus.    Eyes: Negative for visual disturbance.   Respiratory: Negative for cough, shortness of breath and wheezing.    Cardiovascular: Negative " for chest pain, palpitations and leg swelling.   Gastrointestinal: Negative for abdominal distention and abdominal pain.   Genitourinary: Negative for dysuria, frequency, hematuria and urgency.   Musculoskeletal: Negative for back pain and joint swelling.   Neurological: Negative for weakness and headaches.   Hematological: Negative for adenopathy.   Psychiatric/Behavioral: Positive for sleep disturbance. Negative for confusion and decreased concentration. The patient is nervous/anxious.        Objective:     Physical Exam   Constitutional: She is oriented to person, place, and time. She appears well-developed and well-nourished. No distress.   HENT:   Right Ear: External ear normal.   Left Ear: External ear normal.   Nose: Nose normal.   Mouth/Throat: Oropharynx is clear and moist.   Eyes: Pupils are equal, round, and reactive to light. Conjunctivae are normal.   Neck: Normal range of motion. Neck supple. Carotid bruit is not present.   Cardiovascular: Normal rate, regular rhythm and normal heart sounds.   Pulmonary/Chest: Effort normal and breath sounds normal. No respiratory distress. She has no wheezes. She has no rales.   Abdominal: Soft. Bowel sounds are normal. She exhibits no distension. There is no tenderness. There is no guarding.   Musculoskeletal: She exhibits no edema.   Neurological: She is alert and oriented to person, place, and time. No cranial nerve deficit.   Skin: Skin is warm and dry. No rash noted.   Psychiatric: She has a normal mood and affect. Her behavior is normal. Judgment and thought content normal.   Nursing note and vitals reviewed.      Lab Results   Component Value Date    WBC 5.59 06/25/2019    HGB 13.4 06/25/2019    HCT 39.2 06/25/2019     06/25/2019    CHOL 233 (H) 06/25/2019    TRIG 187 (H) 06/25/2019    HDL 55 06/25/2019    ALT 12 06/25/2019    AST 19 06/25/2019     06/25/2019    K 3.9 06/25/2019     06/25/2019    CREATININE 0.9 06/25/2019    BUN 8 06/25/2019     CO2 24 06/25/2019    TSH 1.407 06/25/2019    INR 1.0 02/08/2008    HGBA1C 5.7 (H) 06/25/2019       Assessment:     1. Annual physical exam    2. Essential hypertension    3. Hyperlipidemia, unspecified hyperlipidemia type    4. Obesity, unspecified classification, unspecified obesity type, unspecified whether serious comorbidity present    5. Abnormal glucose    6. Vitamin D deficiency         Plan:     Annual physical exam    Essential hypertension  -     CBC auto differential; Future; Expected date: 06/25/2019  -     Comprehensive metabolic panel; Future; Expected date: 06/25/2019    Hyperlipidemia, unspecified hyperlipidemia type  -     Lipid panel; Future; Expected date: 06/25/2019  -     TSH; Future; Expected date: 06/25/2019    Obesity, unspecified classification, unspecified obesity type, unspecified whether serious comorbidity present    Abnormal glucose  -     Hemoglobin A1c; Future; Expected date: 06/25/2019    Vitamin D deficiency  -     Vitamin D; Future; Expected date: 06/25/2019    BP stable  Update labs  Cont current meds  Focus on weight loss with good eating/exercise  F/u annually and PRN

## 2019-06-26 LAB
ESTIMATED AVG GLUCOSE: 117 MG/DL (ref 68–131)
HBA1C MFR BLD HPLC: 5.7 % (ref 4–5.6)

## 2019-08-11 RX ORDER — CLONAZEPAM 0.5 MG/1
TABLET ORAL
Qty: 30 TABLET | Refills: 0 | Status: SHIPPED | OUTPATIENT
Start: 2019-08-11 | End: 2019-09-22 | Stop reason: SDUPTHER

## 2019-09-16 ENCOUNTER — HOSPITAL ENCOUNTER (OUTPATIENT)
Dept: RADIOLOGY | Facility: HOSPITAL | Age: 69
Discharge: HOME | End: 2019-09-16
Attending: PHYSICIAN ASSISTANT
Payer: MEDICARE

## 2019-09-16 ENCOUNTER — OFFICE VISIT (OUTPATIENT)
Dept: INTERNAL MEDICINE | Facility: CLINIC | Age: 69
End: 2019-09-16
Payer: MEDICARE

## 2019-09-16 VITALS
SYSTOLIC BLOOD PRESSURE: 120 MMHG | BODY MASS INDEX: 30.67 KG/M2 | HEART RATE: 81 BPM | WEIGHT: 209.19 LBS | OXYGEN SATURATION: 96 % | DIASTOLIC BLOOD PRESSURE: 68 MMHG | TEMPERATURE: 97 F

## 2019-09-16 DIAGNOSIS — R22.2 SUPRACLAVICULAR MASS: Primary | ICD-10-CM

## 2019-09-16 DIAGNOSIS — R22.2 SUPRACLAVICULAR MASS: ICD-10-CM

## 2019-09-16 DIAGNOSIS — R22.1 NECK MASS: ICD-10-CM

## 2019-09-16 PROCEDURE — 3074F SYST BP LT 130 MM HG: CPT | Mod: HCNC,CPTII,S$GLB, | Performed by: PHYSICIAN ASSISTANT

## 2019-09-16 PROCEDURE — 1101F PT FALLS ASSESS-DOCD LE1/YR: CPT | Mod: HCNC,CPTII,S$GLB, | Performed by: PHYSICIAN ASSISTANT

## 2019-09-16 PROCEDURE — 3074F PR MOST RECENT SYSTOLIC BLOOD PRESSURE < 130 MM HG: ICD-10-PCS | Mod: HCNC,CPTII,S$GLB, | Performed by: PHYSICIAN ASSISTANT

## 2019-09-16 PROCEDURE — 99999 PR PBB SHADOW E&M-EST. PATIENT-LVL IV: CPT | Mod: PBBFAC,HCNC,, | Performed by: PHYSICIAN ASSISTANT

## 2019-09-16 PROCEDURE — 71046 X-RAY EXAM CHEST 2 VIEWS: CPT | Mod: TC,HCNC

## 2019-09-16 PROCEDURE — 99214 PR OFFICE/OUTPT VISIT, EST, LEVL IV, 30-39 MIN: ICD-10-PCS | Mod: HCNC,S$GLB,, | Performed by: PHYSICIAN ASSISTANT

## 2019-09-16 PROCEDURE — 71046 XR CHEST PA AND LATERAL: ICD-10-PCS | Mod: 26,HCNC,, | Performed by: RADIOLOGY

## 2019-09-16 PROCEDURE — 3078F DIAST BP <80 MM HG: CPT | Mod: HCNC,CPTII,S$GLB, | Performed by: PHYSICIAN ASSISTANT

## 2019-09-16 PROCEDURE — 99214 OFFICE O/P EST MOD 30 MIN: CPT | Mod: HCNC,S$GLB,, | Performed by: PHYSICIAN ASSISTANT

## 2019-09-16 PROCEDURE — 71046 X-RAY EXAM CHEST 2 VIEWS: CPT | Mod: 26,HCNC,, | Performed by: RADIOLOGY

## 2019-09-16 PROCEDURE — 3078F PR MOST RECENT DIASTOLIC BLOOD PRESSURE < 80 MM HG: ICD-10-PCS | Mod: HCNC,CPTII,S$GLB, | Performed by: PHYSICIAN ASSISTANT

## 2019-09-16 PROCEDURE — 1101F PR PT FALLS ASSESS DOC 0-1 FALLS W/OUT INJ PAST YR: ICD-10-PCS | Mod: HCNC,CPTII,S$GLB, | Performed by: PHYSICIAN ASSISTANT

## 2019-09-16 PROCEDURE — 99999 PR PBB SHADOW E&M-EST. PATIENT-LVL IV: ICD-10-PCS | Mod: PBBFAC,HCNC,, | Performed by: PHYSICIAN ASSISTANT

## 2019-09-16 NOTE — PROGRESS NOTES
Subjective:      Patient ID: Kailey Stokes is a 69 y.o. female.    Chief Complaint: Mass (neck area; present 1 year approx per patient; burning pain x 2-3 wks)    Last few months has had pain and increase in size. Ultrasound completed last year did not show any abnormalities. Has doubled in size in the past couple months.     Mass   This is a new problem. Episode onset: 1 year. The problem occurs constantly. The problem has been gradually worsening. Associated symptoms include a change in bowel habit, chest pain, coughing, diaphoresis (night sweats), headaches and neck pain. Pertinent negatives include no abdominal pain, anorexia, arthralgias, chills, congestion, fatigue (.), fever, joint swelling, myalgias, nausea, numbness, rash, sore throat, swollen glands, urinary symptoms, vertigo, visual change, vomiting or weakness. Nothing aggravates the symptoms. She has tried nothing for the symptoms. The treatment provided no relief.   Also has left sided rib tenderness. Recent mammogram was negative.     Review of Systems   Constitutional: Positive for diaphoresis (night sweats). Negative for activity change, appetite change, chills, fatigue (.), fever and unexpected weight change.   HENT: Negative.  Negative for congestion, hearing loss, postnasal drip, rhinorrhea, sore throat, trouble swallowing and voice change.    Eyes: Negative.  Negative for visual disturbance.   Respiratory: Positive for cough. Negative for choking, chest tightness and shortness of breath.    Cardiovascular: Positive for chest pain. Negative for palpitations and leg swelling.   Gastrointestinal: Positive for change in bowel habit and constipation. Negative for abdominal distention, abdominal pain, anal bleeding, anorexia, blood in stool, diarrhea, nausea, rectal pain and vomiting.   Endocrine: Negative for cold intolerance, heat intolerance, polydipsia and polyuria.   Genitourinary: Negative.  Negative for difficulty urinating and frequency.    Musculoskeletal: Positive for neck pain. Negative for arthralgias, back pain, gait problem, joint swelling and myalgias.   Skin: Negative for color change, pallor, rash and wound.   Neurological: Positive for headaches. Negative for dizziness, vertigo, tremors, weakness, light-headedness and numbness.   Hematological: Negative for adenopathy.   Psychiatric/Behavioral: Negative for behavioral problems, confusion, self-injury, sleep disturbance and suicidal ideas. The patient is not nervous/anxious.      Objective:   /68 (BP Location: Right arm, Patient Position: Sitting, BP Method: Large (Manual))   Pulse 81   Temp 96.7 °F (35.9 °C) (Tympanic)   Wt 94.9 kg (209 lb 3.5 oz)   SpO2 96%   BMI 30.67 kg/m²     Physical Exam   Constitutional: She is oriented to person, place, and time. She appears well-developed and well-nourished. No distress.   HENT:   Head: Normocephalic and atraumatic.   Right Ear: External ear normal.   Left Ear: External ear normal.   Nose: Nose normal.   Mouth/Throat: Oropharynx is clear and moist. No oropharyngeal exudate.   Eyes: Pupils are equal, round, and reactive to light. Conjunctivae and EOM are normal.   Neck: Normal range of motion. Neck supple. Muscular tenderness present. Normal range of motion present.       Cardiovascular: Normal rate, regular rhythm and normal heart sounds. Exam reveals no gallop and no friction rub.   No murmur heard.  Pulmonary/Chest: Effort normal and breath sounds normal. No respiratory distress. She has no wheezes. She has no rales. She exhibits tenderness. She exhibits no mass, no edema and no swelling.       Abdominal: Soft. She exhibits no distension. There is no tenderness.   Musculoskeletal: Normal range of motion.   Lymphadenopathy:     She has no cervical adenopathy.   Neurological: She is alert and oriented to person, place, and time.   Skin: Skin is warm and dry. She is not diaphoretic.   Psychiatric: She has a normal mood and affect. Her  behavior is normal. Judgment and thought content normal.   Vitals reviewed.              X-Ray Chest PA And Lateral  Narrative: EXAMINATION:  XR CHEST PA AND LATERAL    CLINICAL HISTORY:  Localized swelling, mass and lump, trunk    TECHNIQUE:  PA and lateral views of the chest were performed.    COMPARISON:  06/08/2010    FINDINGS:  The cardiac and mediastinal silhouettes appear within normal limits.   The lungs are clear bilaterally.  No acute osseous findings demonstrated.  Impression: No acute findings.    Electronically signed by: Delon Christensen MD  Date:    09/16/2019  Time:    10:30      Assessment:     1. Supraclavicular mass      Plan:   Supraclavicular mass  -     X-Ray Chest PA And Lateral; Future; Expected date: 09/16/2019    -CXR negative.  -CT neck with contrast, serum creatinine, and refer to gen surg    Follow up if symptoms worsen or fail to improve.

## 2019-09-16 NOTE — PROGRESS NOTES
Result and recommendation conveyed to patient. Patient verbalized understanding and scheduled all recommended testing and referrals as per CHER Baker's recommendation. Patient voiced understanding of appt dates, times, and locations.

## 2019-09-17 ENCOUNTER — PATIENT MESSAGE (OUTPATIENT)
Dept: INTERNAL MEDICINE | Facility: CLINIC | Age: 69
End: 2019-09-17

## 2019-09-18 NOTE — TELEPHONE ENCOUNTER
Patient reported she has concerns regarding upcoming CT scan with contrast as several family members have had issues with the contrast and there has even been 1 death of a family member per patient due to contrast complications.    Patient requesting call back from Ms. Olivia PA-C, regarding this matter.

## 2019-09-19 DIAGNOSIS — R22.2 SUPRACLAVICULAR MASS: Primary | ICD-10-CM

## 2019-09-19 NOTE — TELEPHONE ENCOUNTER
Gopi messaged patient with radiology scheduling phone number for her convenience to schedule ultrasound.

## 2019-09-19 NOTE — TELEPHONE ENCOUNTER
Elma Baker PA-C  You 16 minutes ago (9:03 AM)      Patient would like to try an ultrasound first. I put in the order. See if she can have the ultrasound done sooner. Call patient to schedule. Thanks.     Routing comment

## 2019-09-20 ENCOUNTER — TELEPHONE (OUTPATIENT)
Dept: RADIOLOGY | Facility: HOSPITAL | Age: 69
End: 2019-09-20

## 2019-09-22 RX ORDER — CLONAZEPAM 0.5 MG/1
TABLET ORAL
Qty: 30 TABLET | Refills: 3 | Status: SHIPPED | OUTPATIENT
Start: 2019-09-22 | End: 2020-02-05

## 2019-09-23 ENCOUNTER — LAB VISIT (OUTPATIENT)
Dept: LAB | Facility: HOSPITAL | Age: 69
End: 2019-09-23
Attending: PHYSICAL MEDICINE & REHABILITATION
Payer: MEDICARE

## 2019-09-23 ENCOUNTER — OFFICE VISIT (OUTPATIENT)
Dept: SURGERY | Facility: CLINIC | Age: 69
End: 2019-09-23
Payer: MEDICARE

## 2019-09-23 VITALS
WEIGHT: 209 LBS | TEMPERATURE: 99 F | DIASTOLIC BLOOD PRESSURE: 73 MMHG | SYSTOLIC BLOOD PRESSURE: 114 MMHG | HEIGHT: 68 IN | BODY MASS INDEX: 31.67 KG/M2 | HEART RATE: 81 BPM

## 2019-09-23 DIAGNOSIS — M54.2 CERVICALGIA: Primary | ICD-10-CM

## 2019-09-23 DIAGNOSIS — M54.2 NECK PAIN: Primary | ICD-10-CM

## 2019-09-23 DIAGNOSIS — R79.1 ABNORMAL COAGULATION PROFILE: ICD-10-CM

## 2019-09-23 LAB
ALBUMIN SERPL BCP-MCNC: 4.3 G/DL (ref 3.5–5.2)
ALP SERPL-CCNC: 92 U/L (ref 55–135)
ALT SERPL W/O P-5'-P-CCNC: 9 U/L (ref 10–44)
ANION GAP SERPL CALC-SCNC: 12 MMOL/L (ref 8–16)
APTT BLDCRRT: 31.4 SEC (ref 21–32)
AST SERPL-CCNC: 18 U/L (ref 10–40)
BASOPHILS # BLD AUTO: 0.04 K/UL (ref 0–0.2)
BASOPHILS NFR BLD: 0.7 % (ref 0–1.9)
BILIRUB SERPL-MCNC: 0.7 MG/DL (ref 0.1–1)
BUN SERPL-MCNC: 14 MG/DL (ref 8–23)
CALCIUM SERPL-MCNC: 9.7 MG/DL (ref 8.7–10.5)
CHLORIDE SERPL-SCNC: 105 MMOL/L (ref 95–110)
CO2 SERPL-SCNC: 24 MMOL/L (ref 23–29)
CREAT SERPL-MCNC: 1 MG/DL (ref 0.5–1.4)
DIFFERENTIAL METHOD: NORMAL
EOSINOPHIL # BLD AUTO: 0.1 K/UL (ref 0–0.5)
EOSINOPHIL NFR BLD: 1.4 % (ref 0–8)
ERYTHROCYTE [DISTWIDTH] IN BLOOD BY AUTOMATED COUNT: 13 % (ref 11.5–14.5)
EST. GFR  (AFRICAN AMERICAN): >60 ML/MIN/1.73 M^2
EST. GFR  (NON AFRICAN AMERICAN): 58 ML/MIN/1.73 M^2
GLUCOSE SERPL-MCNC: 97 MG/DL (ref 70–110)
HCT VFR BLD AUTO: 39.4 % (ref 37–48.5)
HGB BLD-MCNC: 13.8 G/DL (ref 12–16)
IMM GRANULOCYTES # BLD AUTO: 0.01 K/UL (ref 0–0.04)
IMM GRANULOCYTES NFR BLD AUTO: 0.2 % (ref 0–0.5)
INR PPP: 1 (ref 0.8–1.2)
LYMPHOCYTES # BLD AUTO: 1.9 K/UL (ref 1–4.8)
LYMPHOCYTES NFR BLD: 34 % (ref 18–48)
MCH RBC QN AUTO: 29.1 PG (ref 27–31)
MCHC RBC AUTO-ENTMCNC: 35 G/DL (ref 32–36)
MCV RBC AUTO: 83 FL (ref 82–98)
MONOCYTES # BLD AUTO: 0.5 K/UL (ref 0.3–1)
MONOCYTES NFR BLD: 8 % (ref 4–15)
NEUTROPHILS # BLD AUTO: 3.2 K/UL (ref 1.8–7.7)
NEUTROPHILS NFR BLD: 55.9 % (ref 38–73)
NRBC BLD-RTO: 0 /100 WBC
PLATELET # BLD AUTO: 277 K/UL (ref 150–350)
PMV BLD AUTO: 9.3 FL (ref 9.2–12.9)
POTASSIUM SERPL-SCNC: 4.6 MMOL/L (ref 3.5–5.1)
PROT SERPL-MCNC: 7.9 G/DL (ref 6–8.4)
PROTHROMBIN TIME: 10.3 SEC (ref 9–12.5)
RBC # BLD AUTO: 4.75 M/UL (ref 4–5.4)
SODIUM SERPL-SCNC: 141 MMOL/L (ref 136–145)
WBC # BLD AUTO: 5.64 K/UL (ref 3.9–12.7)

## 2019-09-23 PROCEDURE — 99204 PR OFFICE/OUTPT VISIT, NEW, LEVL IV, 45-59 MIN: ICD-10-PCS | Mod: HCNC,S$GLB,, | Performed by: SURGERY

## 2019-09-23 PROCEDURE — 99999 PR PBB SHADOW E&M-EST. PATIENT-LVL III: ICD-10-PCS | Mod: PBBFAC,HCNC,, | Performed by: SURGERY

## 2019-09-23 PROCEDURE — 1101F PT FALLS ASSESS-DOCD LE1/YR: CPT | Mod: HCNC,CPTII,S$GLB, | Performed by: SURGERY

## 2019-09-23 PROCEDURE — 36415 COLL VENOUS BLD VENIPUNCTURE: CPT | Mod: HCNC

## 2019-09-23 PROCEDURE — 85025 COMPLETE CBC W/AUTO DIFF WBC: CPT | Mod: HCNC

## 2019-09-23 PROCEDURE — 99204 OFFICE O/P NEW MOD 45 MIN: CPT | Mod: HCNC,S$GLB,, | Performed by: SURGERY

## 2019-09-23 PROCEDURE — 3078F PR MOST RECENT DIASTOLIC BLOOD PRESSURE < 80 MM HG: ICD-10-PCS | Mod: HCNC,CPTII,S$GLB, | Performed by: SURGERY

## 2019-09-23 PROCEDURE — 80053 COMPREHEN METABOLIC PANEL: CPT | Mod: HCNC

## 2019-09-23 PROCEDURE — 1101F PR PT FALLS ASSESS DOC 0-1 FALLS W/OUT INJ PAST YR: ICD-10-PCS | Mod: HCNC,CPTII,S$GLB, | Performed by: SURGERY

## 2019-09-23 PROCEDURE — 99999 PR PBB SHADOW E&M-EST. PATIENT-LVL III: CPT | Mod: PBBFAC,HCNC,, | Performed by: SURGERY

## 2019-09-23 PROCEDURE — 85610 PROTHROMBIN TIME: CPT | Mod: HCNC

## 2019-09-23 PROCEDURE — 3078F DIAST BP <80 MM HG: CPT | Mod: HCNC,CPTII,S$GLB, | Performed by: SURGERY

## 2019-09-23 PROCEDURE — 3074F PR MOST RECENT SYSTOLIC BLOOD PRESSURE < 130 MM HG: ICD-10-PCS | Mod: HCNC,CPTII,S$GLB, | Performed by: SURGERY

## 2019-09-23 PROCEDURE — 85730 THROMBOPLASTIN TIME PARTIAL: CPT | Mod: HCNC

## 2019-09-23 PROCEDURE — 3074F SYST BP LT 130 MM HG: CPT | Mod: HCNC,CPTII,S$GLB, | Performed by: SURGERY

## 2019-09-23 NOTE — LETTER
September 23, 2019      Elma Baker PA-C  [address] [address]           [facility]  [address] [place] [address]  Phone: [phone]  Fax: [phone]          Patient: Kailey Stokes   MR Number: [id number]   YOB: 1950   Date of Visit: 9/23/2019       Dear Elma Baker:    Thank you for referring Kailey Stokes to me for evaluation. Attached you will find relevant portions of my assessment and plan of care.    If you have questions, please do not hesitate to call me. I look forward to following Kailey Stokes along with you.    Sincerely,    Robin Quach MD    Enclosure  CC:  No Recipients    If you would like to receive this communication electronically, please contact [email or web address] or [phone] to request more information on [employer] access.    For providers and/or their staff who would like to refer a patient to [facility], please contact us through our one-stop-shop provider referral line, [facility], at [phone].    If you feel you have received this communication in error or would no longer like to receive these types of communications, please e-mail [email or web address]

## 2019-09-23 NOTE — PROGRESS NOTES
History & Physical    SUBJECTIVE:     History of Present Illness:  Patient is a 69 y.o. female referred for neck mass.  Patient feels like she has a neck mass on the right side just above her sternal clavicular junction. She was seen a year ago by her PCP for this who ordered an ultrasound and no mass was identified. Since this time she feels like the area is bigger causing discomfort.  She also has posterior cervical pain from a bulging disc which she sees pain management for injections for.  She has undergone trial of physical therapy for this in the past the reports had to stop early due to the discomfort.    Chief Complaint   Patient presents with    Cyst       Review of patient's allergies indicates:   Allergen Reactions    Bromocriptine      Other reaction(s): nightmares  Other reaction(s): anixety    Codeine      Other reaction(s): Headache    Morphine Rash       Current Outpatient Medications   Medication Sig Dispense Refill    amLODIPine (NORVASC) 2.5 MG tablet Take 1 tablet (2.5 mg total) by mouth once daily. 30 tablet 11    clonazePAM (KLONOPIN) 0.5 MG tablet TAKE 1 TABLET(0.5 MG) BY MOUTH DAILY AS NEEDED FOR ANXIETY 30 tablet 3    estradiol (ESTRACE) 0.5 MG tablet Take 1 tablet (0.5 mg total) by mouth once daily. 30 tablet 0    pantoprazole (PROTONIX) 40 MG tablet TAKE 1 TABLET(40 MG) BY MOUTH EVERY DAY 90 tablet 3    pravastatin (PRAVACHOL) 80 MG tablet TAKE 1 TABLET(80 MG) BY MOUTH EVERY DAY 90 tablet 6    aspirin (ECOTRIN) 81 MG EC tablet Take 81 mg by mouth once daily.      cranberry fruit extract (CRANBERRY EXTRACT ORAL) Take 1 tablet by mouth once daily.      ergocalciferol, vitamin D2, (VITAMIN D ORAL) Take 1 tablet by mouth once daily.      gabapentin (NEURONTIN) 300 MG capsule TAKE 1 CAPSULE(300 MG) BY MOUTH EVERY NIGHT AS NEEDED (Patient not taking: Reported on 9/23/2019) 30 capsule 3     No current facility-administered medications for this visit.        Past Medical History:    Diagnosis Date    Abnormal Pap smear     Anxiety     Dr. Bose    Breast disorder     Pain in left breast    Family history of colonic polyps 10/18/2018    Her brother and sister have both had colon polyps.    Hyperlipidemia     Hypertension      Past Surgical History:   Procedure Laterality Date    COLONOSCOPY N/A 10/18/2018    [facility]    HYSTERECTOMY      KNEE SURGERY      TOTAL ABDOMINAL HYSTERECTOMY W/ BILATERAL SALPINGOOPHORECTOMY       Family History   Problem Relation Age of Onset    Diabetes Mother     Glaucoma Mother     Hypertension Father     Heart disease Father     Breast cancer Maternal Aunt     Diabetes Sister     Glaucoma Sister     Lymphoma Sister     Diabetes Brother      Social History     Tobacco Use    Smoking status: Never Smoker    Smokeless tobacco: Never Used   Substance Use Topics    Alcohol use: Yes     Comment: occ use    Drug use: No        Review of Systems:  Review of Systems   Constitutional: Negative for activity change, appetite change, chills, diaphoresis, fatigue, fever and unexpected weight change.   HENT: Negative for congestion, dental problem, rhinorrhea and sore throat.    Eyes: Negative for visual disturbance.   Respiratory: Negative for cough, chest tightness, shortness of breath, wheezing and stridor.    Cardiovascular: Negative for chest pain, palpitations and leg swelling.   Gastrointestinal: Negative for abdominal distention, abdominal pain, constipation, diarrhea, nausea and vomiting.   Endocrine: Negative for cold intolerance, heat intolerance, polydipsia, polyphagia and polyuria.   Genitourinary: Negative for difficulty urinating, dysuria, frequency, hematuria and urgency.   Musculoskeletal: Negative for arthralgias, gait problem, myalgias and neck pain.   Skin: Negative for color change, pallor, rash and wound.   Neurological: Negative for dizziness, syncope, weakness, light-headedness, numbness and  "headaches.   Hematological: Negative for adenopathy. Does not bruise/bleed easily.   Psychiatric/Behavioral: Negative for confusion, decreased concentration and sleep disturbance. The patient is not nervous/anxious.        OBJECTIVE:     Vital Signs (Most Recent)  Temp: 98.6 °F (37 °C) (09/23/19 1151)  Pulse: 81 (09/23/19 1151)  BP: 114/73 (09/23/19 1151)  5' 8.25" (1.734 m)  94.8 kg (208 lb 15.9 oz)     Physical Exam:  Physical Exam   Constitutional: She is oriented to person, place, and time. She appears well-developed and well-nourished. No distress.   HENT:   Head: Normocephalic and atraumatic.   Right Ear: External ear normal.   Left Ear: External ear normal.   Eyes: Pupils are equal, round, and reactive to light. Conjunctivae and EOM are normal. No scleral icterus.   Neck: Normal range of motion. Neck supple. No tracheal deviation present. No thyromegaly present.       Cardiovascular: Normal rate, regular rhythm, normal heart sounds and intact distal pulses. Exam reveals no gallop and no friction rub.   No murmur heard.  Pulmonary/Chest: Effort normal and breath sounds normal. No respiratory distress. She has no wheezes. She has no rales. She exhibits no tenderness.   Abdominal: Soft. Bowel sounds are normal. She exhibits no distension. There is no tenderness. No hernia.   Musculoskeletal: Normal range of motion. She exhibits no edema, tenderness or deformity.   Lymphadenopathy:     She has no cervical adenopathy.   Neurological: She is alert and oriented to person, place, and time.   Skin: Skin is warm and dry. No rash noted. She is not diaphoretic. No erythema. No pallor.   Psychiatric: She has a normal mood and affect. Her behavior is normal. Judgment and thought content normal.   Vitals reviewed.      Diagnostic Results:  09/2018 Ultrasound neck:  No sonographic abnormality demonstrated.    ASSESSMENT/PLAN:     69-year-old female with neck pain/slightly more prominent right-sided sternoclavicular " junction    PLAN:Plan     Patient scheduled for ultrasound of neck will follow up results, if mass noted further recs to follow  No mass noted on ultrasound recommend patient follow up with physician treating bulging disc and neck pain as this may be accounting for her neck pain/tenseness with her asymmetry; may benefit from physical therapy

## 2019-09-25 ENCOUNTER — HOSPITAL ENCOUNTER (OUTPATIENT)
Dept: RADIOLOGY | Facility: HOSPITAL | Age: 69
Discharge: HOME | End: 2019-09-25
Attending: PHYSICIAN ASSISTANT
Payer: MEDICARE

## 2019-09-25 DIAGNOSIS — R22.2 SUPRACLAVICULAR MASS: ICD-10-CM

## 2019-09-25 PROCEDURE — 76604 PR US CHEST / UPPER BACK: ICD-10-PCS | Mod: 26,52,HCNC, | Performed by: RADIOLOGY

## 2019-09-25 PROCEDURE — 76999 ECHO EXAMINATION PROCEDURE: CPT | Mod: TC,HCNC

## 2019-09-25 PROCEDURE — 76604 US EXAM CHEST: CPT | Mod: 26,52,HCNC, | Performed by: RADIOLOGY

## 2019-09-26 ENCOUNTER — PATIENT MESSAGE (OUTPATIENT)
Dept: INTERNAL MEDICINE | Facility: CLINIC | Age: 69
End: 2019-09-26

## 2019-09-26 DIAGNOSIS — M54.2 NECK PAIN: Primary | ICD-10-CM

## 2019-09-27 ENCOUNTER — TELEPHONE (OUTPATIENT)
Dept: INTERNAL MEDICINE | Facility: CLINIC | Age: 69
End: 2019-09-27

## 2019-09-27 NOTE — TELEPHONE ENCOUNTER
ABDON Ramos Staff (Int Med) 15 hours ago (4:05 PM)      Per general surgery recommendation. Refer to physiatry for evaluation of neck pain and asymmetry. Referral placed.     Routing comment

## 2019-09-27 NOTE — TELEPHONE ENCOUNTER
"Patient reported she [facility] a [facility] "injections" in her neck for pain.    -------------------------------------------------------------------------------------    ABDON Ramos Staff (Int Med)             Is patient currently seeing pain management? It looks like the general surgeon would like her to follow up with her pain management specialist instead of doing physical therapy.    Previous Messages      ----- Message -----   From: Brielle Vizcaino MD   Sent: 9/27/2019  10:49 AM CDT   To: Elma Baker PA-C   Subject: FW: Referral                                     Андрей Wills,     I've looked into this referral and agree with general surgery recommendation that the patient follow-up with her pain management physician. Physical Medicine & Rehabilitation docs aren't the same as pain management. Since no mass was appreciated on ultrasound and she has chronic neck pain, I think sticking with pain management is her best bet.     ThanksBrielle         ----- Message -----   From: Gayla Grace MA   Sent: 9/27/2019  10:48 AM CDT   To: Brielle Vizcaino MD   Subject: Referral             "

## 2019-09-27 NOTE — TELEPHONE ENCOUNTER
Called and left a voicemail for patient to call and follow up with general surgery department about her neck.

## 2019-09-27 NOTE — TELEPHONE ENCOUNTER
Notified patient via [employer] that recommendation is to see a physiatrist as per Ms. CHER Baker PA-C. Offered to schedule soonest appointment (today) with that department also.

## 2019-09-29 NOTE — TELEPHONE ENCOUNTER
Message   Received: 2 days ago   Message Contents   ABDON Ramos LPN   Caller: Unspecified (2 days ago, 11:44 AM)             Patient saw general surgery for the mass. General surgery thinks that her swelling is due to her neck issues. Have her follow up with them to discuss this further.

## 2019-10-07 ENCOUNTER — OFFICE VISIT (OUTPATIENT)
Dept: SURGERY | Facility: CLINIC | Age: 69
End: 2019-10-07
Payer: MEDICARE

## 2019-10-07 VITALS — BODY MASS INDEX: 32.88 KG/M2 | HEIGHT: 68 IN | WEIGHT: 216.94 LBS

## 2019-10-07 DIAGNOSIS — M54.2 NECK PAIN: Primary | ICD-10-CM

## 2019-10-07 PROCEDURE — 99999 PR PBB SHADOW E&M-EST. PATIENT-LVL II: CPT | Mod: PBBFAC,HCNC,, | Performed by: SURGERY

## 2019-10-07 PROCEDURE — 99213 OFFICE O/P EST LOW 20 MIN: CPT | Mod: HCNC,S$GLB,, | Performed by: SURGERY

## 2019-10-07 PROCEDURE — 99999 PR PBB SHADOW E&M-EST. PATIENT-LVL II: ICD-10-PCS | Mod: PBBFAC,HCNC,, | Performed by: SURGERY

## 2019-10-07 PROCEDURE — 1101F PT FALLS ASSESS-DOCD LE1/YR: CPT | Mod: HCNC,CPTII,S$GLB, | Performed by: SURGERY

## 2019-10-07 PROCEDURE — 1101F PR PT FALLS ASSESS DOC 0-1 FALLS W/OUT INJ PAST YR: ICD-10-PCS | Mod: HCNC,CPTII,S$GLB, | Performed by: SURGERY

## 2019-10-07 PROCEDURE — 99213 PR OFFICE/OUTPT VISIT, EST, LEVL III, 20-29 MIN: ICD-10-PCS | Mod: HCNC,S$GLB,, | Performed by: SURGERY

## 2019-10-07 NOTE — PROGRESS NOTES
History & Physical    SUBJECTIVE:     History of Present Illness:  Patient is a 69 y.o. female follows up for ongoing bulging right lower neck.  She recently underwent ultrasound which was negative for any underlying pathology.  Patient is still concerned with her neck bulging and discomfort.    Initially referred for neck mass.  Patient feels like she has a neck mass on the right side just above her sternal clavicular junction. She was seen a year ago by her PCP for this who ordered an ultrasound and no mass was identified. Since this time she feels like the area is bigger causing discomfort.  She also has posterior cervical pain from a bulging disc which she sees pain management for injections for.  She has undergone trial of physical therapy for this in the past the reports had to stop early due to the discomfort.    Chief Complaint   Patient presents with    Follow-up       Review of patient's allergies indicates:   Allergen Reactions    Bromocriptine      Other reaction(s): nightmares  Other reaction(s): anixety    Codeine      Other reaction(s): Headache    Morphine Rash       Current Outpatient Medications   Medication Sig Dispense Refill    amLODIPine (NORVASC) 2.5 MG tablet Take 1 tablet (2.5 mg total) by mouth once daily. 30 tablet 11    aspirin (ECOTRIN) 81 MG EC tablet Take 81 mg by mouth once daily.      clonazePAM (KLONOPIN) 0.5 MG tablet TAKE 1 TABLET(0.5 MG) BY MOUTH DAILY AS NEEDED FOR ANXIETY 30 tablet 3    cranberry fruit extract (CRANBERRY EXTRACT ORAL) Take 1 tablet by mouth once daily.      ergocalciferol, vitamin D2, (VITAMIN D ORAL) Take 1 tablet by mouth once daily.      estradiol (ESTRACE) 0.5 MG tablet Take 1 tablet (0.5 mg total) by mouth once daily. 30 tablet 0    gabapentin (NEURONTIN) 300 MG capsule TAKE 1 CAPSULE(300 MG) BY MOUTH EVERY NIGHT AS NEEDED 30 capsule 3    pantoprazole (PROTONIX) 40 MG tablet TAKE 1 TABLET(40 MG) BY MOUTH EVERY DAY 90 tablet 3    pravastatin  (PRAVACHOL) 80 MG tablet TAKE 1 TABLET(80 MG) BY MOUTH EVERY DAY 90 tablet 6     No current facility-administered medications for this visit.        Past Medical History:   Diagnosis Date    Abnormal Pap smear     Anxiety     Dr. Bose    Breast disorder     Pain in left breast    Family history of colonic polyps 10/18/2018    Her brother and sister have both had colon polyps.    Hyperlipidemia     Hypertension      Past Surgical History:   Procedure Laterality Date    COLONOSCOPY N/A 10/18/2018    Procedure: COLONOSCOPY;  Surgeon: Jayjay Grier MD;  [facility];  Service: Endoscopy;  Laterality: N/A;    HYSTERECTOMY      KNEE SURGERY      TOTAL ABDOMINAL HYSTERECTOMY W/ BILATERAL SALPINGOOPHORECTOMY       Family History   Problem Relation Age of Onset    Diabetes Mother     Glaucoma Mother     Hypertension Father     Heart disease Father     Breast cancer Maternal Aunt     Diabetes Sister     Glaucoma Sister     Lymphoma Sister     Diabetes Brother      Social History     Tobacco Use    Smoking status: Never Smoker    Smokeless tobacco: Never Used   Substance Use Topics    Alcohol use: Yes     Comment: occ use    Drug use: No        Review of Systems:  Review of Systems   Constitutional: Negative for activity change, appetite change, chills, diaphoresis, fatigue, fever and unexpected weight change.   HENT: Negative for congestion, dental problem, rhinorrhea and sore throat.    Eyes: Negative for visual disturbance.   Respiratory: Negative for cough, chest tightness, shortness of breath, wheezing and stridor.    Cardiovascular: Negative for chest pain, palpitations and leg swelling.   Gastrointestinal: Negative for abdominal distention, abdominal pain, constipation, diarrhea, nausea and vomiting.   Endocrine: Negative for cold intolerance, heat intolerance, polydipsia, polyphagia and polyuria.   Genitourinary: Negative for difficulty urinating, dysuria, frequency, hematuria and  "urgency.   Musculoskeletal: Negative for arthralgias, gait problem, myalgias and neck pain.   Skin: Negative for color change, pallor, rash and wound.   Neurological: Negative for dizziness, syncope, weakness, light-headedness, numbness and headaches.   Hematological: Negative for adenopathy. Does not bruise/bleed easily.   Psychiatric/Behavioral: Negative for confusion, decreased concentration and sleep disturbance. The patient is not nervous/anxious.        OBJECTIVE:     Vital Signs (Most Recent)     5' 8.25" (1.734 m)  98.4 kg (216 lb 14.9 oz)     Physical Exam:  Physical Exam   Constitutional: She is oriented to person, place, and time. She appears well-developed and well-nourished. No distress.   HENT:   Head: Normocephalic and atraumatic.   Right Ear: External ear normal.   Left Ear: External ear normal.   Eyes: Pupils are equal, round, and reactive to light. Conjunctivae and EOM are normal. No scleral icterus.   Neck: Normal range of motion. Neck supple. No tracheal deviation present. No thyromegaly present.       Cardiovascular: Normal rate, regular rhythm, normal heart sounds and intact distal pulses. Exam reveals no gallop and no friction rub.   No murmur heard.  Pulmonary/Chest: Effort normal and breath sounds normal. No respiratory distress. She has no wheezes. She has no rales. She exhibits no tenderness.   Abdominal: Soft. Bowel sounds are normal. She exhibits no distension. There is no tenderness. No hernia.   Musculoskeletal: Normal range of motion. She exhibits no edema, tenderness or deformity.   Lymphadenopathy:     She has no cervical adenopathy.   Neurological: She is alert and oriented to person, place, and time.   Skin: Skin is warm and dry. No rash noted. She is not diaphoretic. No erythema. No pallor.   Psychiatric: She has a normal mood and affect. Her behavior is normal. Judgment and thought content normal.   Vitals reviewed.      Diagnostic Results:  Chest x-ray:  The cardiac and " mediastinal silhouettes appear within normal limits.   The lungs are clear bilaterally.  No acute osseous findings demonstrated.    Ultrasound neck:  FINDINGS:  No discrete solid or cystic masses noted.  No lymph nodes.    ASSESSMENT/PLAN:     69-year-old female with neck pain/slightly more prominent right-sided sternoclavicular junction    PLAN:Plan     No mass seen on ultrasound.  Gave patient option to follow up with physician treating bulging disc and neck pain as this may be accounting for her neck pain/tenseness with her asymmetry; may benefit from physical therapy.  We also discussed option for further imaging with CT or MRI to further evaluate.  She will hold off on further imaging at this time.  If no improvement with injections and other treatment she will call to schedule further imaging.  Follow-up p.r.n.

## 2019-10-30 DIAGNOSIS — Z20.828 EXPOSURE TO THE FLU: Primary | ICD-10-CM

## 2019-10-30 RX ORDER — OSELTAMIVIR PHOSPHATE 75 MG/1
75 CAPSULE ORAL DAILY
Qty: 5 CAPSULE | Refills: 0 | Status: SHIPPED | OUTPATIENT
Start: 2019-10-30 | End: 2019-11-04

## 2019-10-30 NOTE — PROGRESS NOTES
Patient's , Rogelio Stokes, as seen by myself in clinic today and diagnosed with influenza A. Patient requested prophylactic treatment for flu; denies current symptoms. E-prescribed medication for patient.     Vanessa Lopez PA-C

## 2019-11-11 ENCOUNTER — TELEPHONE (OUTPATIENT)
Dept: INTERNAL MEDICINE | Facility: CLINIC | Age: 69
End: 2019-11-11

## 2019-11-11 NOTE — TELEPHONE ENCOUNTER
----- Message from Vandana Preston sent at 11/11/2019 11:45 AM CST -----  Contact: pt  Type:  Sooner Apoointment Request    Caller is requesting a sooner appointment.  Caller declined first available appointment listed below.  Caller will not accept being placed on the waitlist and is requesting a message be sent to doctor.  Name of Caller: the pt   When is the first available appointment? 12/17/2019  Symptoms: knot on neck  Would the patient rather a call back or a response via [facility]? Call back  Best Call Back Number: [phone]  Additional Information: n/a

## 2019-11-12 ENCOUNTER — OFFICE VISIT (OUTPATIENT)
Dept: INTERNAL MEDICINE | Facility: CLINIC | Age: 69
End: 2019-11-12
Payer: MEDICARE

## 2019-11-12 ENCOUNTER — LAB VISIT (OUTPATIENT)
Dept: LAB | Facility: HOSPITAL | Age: 69
End: 2019-11-12
Attending: NURSE PRACTITIONER
Payer: MEDICARE

## 2019-11-12 VITALS
WEIGHT: 205.25 LBS | SYSTOLIC BLOOD PRESSURE: 122 MMHG | RESPIRATION RATE: 16 BRPM | HEIGHT: 69 IN | TEMPERATURE: 98 F | HEART RATE: 78 BPM | BODY MASS INDEX: 30.4 KG/M2 | DIASTOLIC BLOOD PRESSURE: 72 MMHG

## 2019-11-12 DIAGNOSIS — I10 ESSENTIAL HYPERTENSION: ICD-10-CM

## 2019-11-12 DIAGNOSIS — R73.03 PREDIABETES: ICD-10-CM

## 2019-11-12 DIAGNOSIS — F43.21 GRIEF REACTION: ICD-10-CM

## 2019-11-12 DIAGNOSIS — R22.2 SUPRACLAVICULAR MASS: ICD-10-CM

## 2019-11-12 DIAGNOSIS — R73.03 PREDIABETES: Primary | ICD-10-CM

## 2019-11-12 LAB
CREAT SERPL-MCNC: 1 MG/DL (ref 0.5–1.4)
EST. GFR  (AFRICAN AMERICAN): >60 ML/MIN/1.73 M^2
EST. GFR  (NON AFRICAN AMERICAN): 58.4 ML/MIN/1.73 M^2
ESTIMATED AVG GLUCOSE: 123 MG/DL (ref 68–131)
HBA1C MFR BLD HPLC: 5.9 % (ref 4–5.6)

## 2019-11-12 PROCEDURE — 99999 PR PBB SHADOW E&M-EST. PATIENT-LVL IV: CPT | Mod: PBBFAC,,, | Performed by: NURSE PRACTITIONER

## 2019-11-12 PROCEDURE — 3074F SYST BP LT 130 MM HG: CPT | Mod: CPTII,S$GLB,, | Performed by: NURSE PRACTITIONER

## 2019-11-12 PROCEDURE — 3078F DIAST BP <80 MM HG: CPT | Mod: CPTII,S$GLB,, | Performed by: NURSE PRACTITIONER

## 2019-11-12 PROCEDURE — 3078F PR MOST RECENT DIASTOLIC BLOOD PRESSURE < 80 MM HG: ICD-10-PCS | Mod: CPTII,S$GLB,, | Performed by: NURSE PRACTITIONER

## 2019-11-12 PROCEDURE — 3074F PR MOST RECENT SYSTOLIC BLOOD PRESSURE < 130 MM HG: ICD-10-PCS | Mod: CPTII,S$GLB,, | Performed by: NURSE PRACTITIONER

## 2019-11-12 PROCEDURE — 99214 PR OFFICE/OUTPT VISIT, EST, LEVL IV, 30-39 MIN: ICD-10-PCS | Mod: S$GLB,,, | Performed by: NURSE PRACTITIONER

## 2019-11-12 PROCEDURE — 82565 ASSAY OF CREATININE: CPT

## 2019-11-12 PROCEDURE — 36415 COLL VENOUS BLD VENIPUNCTURE: CPT | Mod: PO

## 2019-11-12 PROCEDURE — 1101F PT FALLS ASSESS-DOCD LE1/YR: CPT | Mod: CPTII,S$GLB,, | Performed by: NURSE PRACTITIONER

## 2019-11-12 PROCEDURE — 83036 HEMOGLOBIN GLYCOSYLATED A1C: CPT

## 2019-11-12 PROCEDURE — 1101F PR PT FALLS ASSESS DOC 0-1 FALLS W/OUT INJ PAST YR: ICD-10-PCS | Mod: CPTII,S$GLB,, | Performed by: NURSE PRACTITIONER

## 2019-11-12 PROCEDURE — 99999 PR PBB SHADOW E&M-EST. PATIENT-LVL IV: ICD-10-PCS | Mod: PBBFAC,,, | Performed by: NURSE PRACTITIONER

## 2019-11-12 PROCEDURE — 99214 OFFICE O/P EST MOD 30 MIN: CPT | Mod: S$GLB,,, | Performed by: NURSE PRACTITIONER

## 2019-11-12 RX ORDER — MELOXICAM 15 MG/1
TABLET ORAL
Refills: 1 | COMMUNITY
Start: 2019-10-10 | End: 2020-02-17

## 2019-11-12 RX ORDER — ESTRADIOL 1 MG/1
TABLET ORAL
COMMUNITY
Start: 2019-09-19 | End: 2020-02-17 | Stop reason: SDUPTHER

## 2019-11-12 RX ORDER — TIZANIDINE 4 MG/1
TABLET ORAL
Refills: 0 | COMMUNITY
Start: 2019-10-11 | End: 2020-02-17

## 2019-11-12 NOTE — Clinical Note
Patient signed filomena [facility] scheduled 12/26- can you set a reminder to get those records after that date since it is so close to the end of the year?

## 2019-11-12 NOTE — PROGRESS NOTES
"Subjective:       Patient ID: Kailey Stokes is a 69 y.o. female.    Chief Complaint: Mass    Patient comes in today requesting check of her kidney function and a1c. She saw Dr. ROWE back in  and a1c was in prediabetic range. She has not impletmened any diet or exercise routine. She is down 11 pounds in the last month, but this is due to grief from  dying suddenly from flu. She also needs to get a ct scan done with contrast for a neck mass and wants her kidney function checked. She has had family members that have had issues with contrast die and wants to make sure her kidneys are functioning well prior to the scan.    She has had issues with a mass to the right supraclavicular area for about a year. The mass has gotten larger and now is painful. Ultrasounds have been unremarkable. She has seen general surgery who discussed ct versus mri. Patient wants to proceed with imaging with general surgery but wants her kidneys checked first.    Patient's   recently from complications from the flu. She has been using klinopin prn. She feels she is coping well. Has a good support system. Tearful at times.    Wt Readings from Last 10 Encounters:  19 : 93.1 kg (205 lb 4 oz)  10/07/19 : 98.4 kg (216 lb 14.9 oz)  19 : 94.8 kg (208 lb 15.9 oz)  19 : 94.9 kg (209 lb 3.5 oz)  19 : 94.1 kg (207 lb 7.3 oz)  01/10/19 : 91.9 kg (202 lb 9.6 oz)  18 : 92.2 kg (203 lb 4.2 oz)  10/18/18 : 90.7 kg (200 lb)  10/08/18 : 91.4 kg (201 lb 8 oz)  18 : 92.4 kg (203 lb 11.3 oz)          /72 (BP Location: Left arm, Patient Position: Sitting)   Pulse 78   Temp 98 °F (36.7 °C) (Oral)   Resp 16   Ht 5' 9" (1.753 m)   Wt 93.1 kg (205 lb 4 oz)   BMI 30.31 kg/m²     Review of Systems   Constitutional: Negative for activity change, appetite change, chills, diaphoresis, fatigue, fever and unexpected weight change.   HENT: Negative.    Respiratory: Negative for cough and shortness of breath.  "   Cardiovascular: Negative for chest pain, palpitations and leg swelling.   Gastrointestinal: Negative.    Genitourinary: Negative.    Musculoskeletal: Negative.    Skin: Negative for color change, pallor, rash and wound.        Mass right side of neck/chest, supraclavicular region   Allergic/Immunologic: Negative for immunocompromised state.   Neurological: Negative.  Negative for dizziness and facial asymmetry.   Hematological: Negative for adenopathy. Does not bruise/bleed easily.   Psychiatric/Behavioral: Positive for dysphoric mood. Negative for agitation, behavioral problems and confusion.       Objective:      Physical Exam   Constitutional: She is oriented to person, place, and time. She appears well-developed and well-nourished. She is cooperative. No distress.   HENT:   Head: Normocephalic and atraumatic.   Eyes: Conjunctivae are normal. Right eye exhibits no discharge. Left eye exhibits no discharge.   Cardiovascular: Normal rate, regular rhythm and normal heart sounds.   No murmur heard.  Pulmonary/Chest: Effort normal and breath sounds normal. No respiratory distress. She has no wheezes. She has no rales. She exhibits no tenderness.   Firm area of fullness lateral to the right supraclavicular notch. No erythema.   Abdominal: Soft. She exhibits no distension.   Musculoskeletal: Normal range of motion.   Neurological: She is alert and oriented to person, place, and time.   Skin: Skin is warm and dry. No rash noted. She is not diaphoretic.   Psychiatric: Her behavior is normal. Judgment and thought content normal. She exhibits a depressed mood.   Nursing note and vitals reviewed.      Assessment:       1. Prediabetes    2. Supraclavicular mass    3. Essential hypertension    4. Grief reaction        Plan:       Kailey was seen today for mass.    Diagnoses and all orders for this visit:    Prediabetes  -     Creatinine, serum; Future  -     Hemoglobin A1c; Future    Supraclavicular mass    Essential  hypertension    Grief reaction    discussed with patient that we can recheck a1c as well as kidney function today  She will get with Dr. Ontiveros to move forward with imaging of mass  bp controlled today  Patient taking klonopin prn grief. She understands that this is habit forming and controlled. If she feels she is needing klonopin frequently she should consider a daily med like lexapro, zoloft or wellbutrin. She will keep us posted.  No hi/si currently  Will contact patient with results. Follow up Dr. ROWE 6 months or sooner if issues arise or if she needs ssri

## 2019-11-13 ENCOUNTER — PATIENT MESSAGE (OUTPATIENT)
Dept: SURGERY | Facility: CLINIC | Age: 69
End: 2019-11-13

## 2019-11-18 ENCOUNTER — PATIENT OUTREACH (OUTPATIENT)
Dept: ADMINISTRATIVE | Facility: HOSPITAL | Age: 69
End: 2019-11-18

## 2019-11-18 NOTE — PROGRESS NOTES
Received most recent mammogram, dexa scan, pap smear with hpv, fecal immunochemical test scanned into chart today.

## 2019-11-26 ENCOUNTER — TELEPHONE (OUTPATIENT)
Dept: RADIOLOGY | Facility: HOSPITAL | Age: 69
End: 2019-11-26

## 2019-11-27 ENCOUNTER — HOSPITAL ENCOUNTER (OUTPATIENT)
Dept: RADIOLOGY | Facility: HOSPITAL | Age: 69
Discharge: HOME | End: 2019-11-27
Attending: PHYSICIAN ASSISTANT
Payer: MEDICARE

## 2019-11-27 DIAGNOSIS — R22.1 NECK MASS: ICD-10-CM

## 2019-11-27 PROCEDURE — 25500020 PHARM REV CODE 255: Performed by: PHYSICIAN ASSISTANT

## 2019-11-27 PROCEDURE — 70491 CT SOFT TISSUE NECK WITH CONTRAST: ICD-10-PCS | Mod: 26,,, | Performed by: RADIOLOGY

## 2019-11-27 PROCEDURE — 70491 CT SOFT TISSUE NECK W/DYE: CPT | Mod: TC

## 2019-11-27 PROCEDURE — 70491 CT SOFT TISSUE NECK W/DYE: CPT | Mod: 26,,, | Performed by: RADIOLOGY

## 2019-11-27 RX ADMIN — IOHEXOL 75 ML: 350 INJECTION, SOLUTION INTRAVENOUS at 09:11

## 2019-11-29 ENCOUNTER — PATIENT MESSAGE (OUTPATIENT)
Dept: SURGERY | Facility: CLINIC | Age: 69
End: 2019-11-29

## 2019-12-05 ENCOUNTER — PATIENT MESSAGE (OUTPATIENT)
Dept: SURGERY | Facility: HOSPITAL | Age: 69
End: 2019-12-05

## 2019-12-05 DIAGNOSIS — E04.1 THYROID NODULE: Primary | ICD-10-CM

## 2019-12-05 NOTE — TELEPHONE ENCOUNTER
Discussed CT findings with the patient. No mass noted in the area of concern.  Subcentimeter thyroid nodules patient would like to proceed ultrasound for further evaluation.  Will get this scheduled.  We also discussed the prominence of her collar bone on the right may be due to the neck pain that she has and holding her head/neck in certain positions.  She will follow up with her PCP to discuss the cervical spondylosis to see if this may be contributing with her discomfort.    No abnormality identified at the location of skin marker denoting midline palpable abnormality in the lower neck.  See above.    2 subcentimeter low-attenuation nodules inferior pole left thyroid lobe.  Consider ultrasound if not already performed.    Subcentimeter and shotty short axis nodes without bulky or matted adenopathy identified.    Cervical spondylosis without fracture or subluxation.    Atherosclerotic calcification noted in the area of the right carotid bulb, proximal ICA and ECA as well as the aortic arch.    Additional incidental findings as above. 6

## 2019-12-06 ENCOUNTER — TELEPHONE (OUTPATIENT)
Dept: SURGERY | Facility: CLINIC | Age: 69
End: 2019-12-06

## 2019-12-06 NOTE — TELEPHONE ENCOUNTER
----- Message from Robin Quach MD sent at 12/5/2019  4:58 PM CST -----  Regarding: Ultrasound  Please schedule patient for ultrasound of her thyroid.  I will call her with her results.

## 2019-12-10 ENCOUNTER — PES CALL (OUTPATIENT)
Dept: ADMINISTRATIVE | Facility: CLINIC | Age: 69
End: 2019-12-10

## 2019-12-16 ENCOUNTER — TELEPHONE (OUTPATIENT)
Dept: RADIOLOGY | Facility: HOSPITAL | Age: 69
End: 2019-12-16

## 2019-12-17 ENCOUNTER — HOSPITAL ENCOUNTER (OUTPATIENT)
Dept: RADIOLOGY | Facility: HOSPITAL | Age: 69
Discharge: HOME | End: 2019-12-17
Attending: SURGERY
Payer: MEDICARE

## 2019-12-17 DIAGNOSIS — E04.1 THYROID NODULE: ICD-10-CM

## 2019-12-17 PROCEDURE — 76536 US THYROID: ICD-10-PCS | Mod: 26,HCNC,, | Performed by: RADIOLOGY

## 2019-12-17 PROCEDURE — 76536 US EXAM OF HEAD AND NECK: CPT | Mod: 26,HCNC,, | Performed by: RADIOLOGY

## 2019-12-17 PROCEDURE — 76536 US EXAM OF HEAD AND NECK: CPT | Mod: TC,HCNC

## 2019-12-18 ENCOUNTER — PATIENT OUTREACH (OUTPATIENT)
Dept: ADMINISTRATIVE | Facility: HOSPITAL | Age: 69
End: 2019-12-18

## 2019-12-18 NOTE — PROGRESS NOTES
HM reviewed.   Immunizations abstracted.  Care Everywhere abstracted. No new results found.  Health Maintenance Due   Topic    Mammogram    Will have pt sign PAULINE for mammogram at upcoming visit. Previsit chart audit completed.  *KDL*

## 2019-12-20 ENCOUNTER — PATIENT MESSAGE (OUTPATIENT)
Dept: SURGERY | Facility: HOSPITAL | Age: 69
End: 2019-12-20

## 2020-01-02 ENCOUNTER — PATIENT MESSAGE (OUTPATIENT)
Dept: SURGERY | Facility: HOSPITAL | Age: 70
End: 2020-01-02

## 2020-01-02 NOTE — TELEPHONE ENCOUNTER
Discussed ultrasound findings and recommendations with patient. No surgical needs at this time. She will follow back up with her PCP going forward.

## 2020-01-14 RX ORDER — AMLODIPINE BESYLATE 2.5 MG/1
TABLET ORAL
Qty: 30 TABLET | Refills: 11 | Status: SHIPPED | OUTPATIENT
Start: 2020-01-14 | End: 2020-10-25

## 2020-02-05 RX ORDER — CLONAZEPAM 0.5 MG/1
TABLET ORAL
Qty: 30 TABLET | Refills: 0 | Status: SHIPPED | OUTPATIENT
Start: 2020-02-05 | End: 2020-03-16

## 2020-02-17 ENCOUNTER — OFFICE VISIT (OUTPATIENT)
Dept: INTERNAL MEDICINE | Facility: CLINIC | Age: 70
End: 2020-02-17
Payer: MEDICARE

## 2020-02-17 ENCOUNTER — LAB VISIT (OUTPATIENT)
Dept: LAB | Facility: HOSPITAL | Age: 70
End: 2020-02-17
Attending: FAMILY MEDICINE
Payer: MEDICARE

## 2020-02-17 VITALS
WEIGHT: 200.19 LBS | TEMPERATURE: 98 F | BODY MASS INDEX: 29.65 KG/M2 | SYSTOLIC BLOOD PRESSURE: 100 MMHG | DIASTOLIC BLOOD PRESSURE: 64 MMHG | HEART RATE: 62 BPM | HEIGHT: 69 IN

## 2020-02-17 DIAGNOSIS — R73.09 ABNORMAL GLUCOSE: ICD-10-CM

## 2020-02-17 DIAGNOSIS — I10 ESSENTIAL HYPERTENSION: ICD-10-CM

## 2020-02-17 DIAGNOSIS — R73.09 ABNORMAL GLUCOSE: Primary | ICD-10-CM

## 2020-02-17 DIAGNOSIS — R68.89 COLD INTOLERANCE: ICD-10-CM

## 2020-02-17 DIAGNOSIS — E55.9 VITAMIN D DEFICIENCY: ICD-10-CM

## 2020-02-17 LAB
25(OH)D3+25(OH)D2 SERPL-MCNC: 30 NG/ML (ref 30–96)
TSH SERPL DL<=0.005 MIU/L-ACNC: 1.03 UIU/ML (ref 0.4–4)

## 2020-02-17 PROCEDURE — 99214 PR OFFICE/OUTPT VISIT, EST, LEVL IV, 30-39 MIN: ICD-10-PCS | Mod: HCNC,S$GLB,, | Performed by: FAMILY MEDICINE

## 2020-02-17 PROCEDURE — 82306 VITAMIN D 25 HYDROXY: CPT | Mod: HCNC

## 2020-02-17 PROCEDURE — 1101F PR PT FALLS ASSESS DOC 0-1 FALLS W/OUT INJ PAST YR: ICD-10-PCS | Mod: HCNC,CPTII,S$GLB, | Performed by: FAMILY MEDICINE

## 2020-02-17 PROCEDURE — 1125F AMNT PAIN NOTED PAIN PRSNT: CPT | Mod: HCNC,S$GLB,, | Performed by: FAMILY MEDICINE

## 2020-02-17 PROCEDURE — 99999 PR PBB SHADOW E&M-EST. PATIENT-LVL III: CPT | Mod: PBBFAC,HCNC,, | Performed by: FAMILY MEDICINE

## 2020-02-17 PROCEDURE — 99214 OFFICE O/P EST MOD 30 MIN: CPT | Mod: HCNC,S$GLB,, | Performed by: FAMILY MEDICINE

## 2020-02-17 PROCEDURE — 84443 ASSAY THYROID STIM HORMONE: CPT | Mod: HCNC

## 2020-02-17 PROCEDURE — 3078F PR MOST RECENT DIASTOLIC BLOOD PRESSURE < 80 MM HG: ICD-10-PCS | Mod: HCNC,CPTII,S$GLB, | Performed by: FAMILY MEDICINE

## 2020-02-17 PROCEDURE — 3074F SYST BP LT 130 MM HG: CPT | Mod: HCNC,CPTII,S$GLB, | Performed by: FAMILY MEDICINE

## 2020-02-17 PROCEDURE — 99999 PR PBB SHADOW E&M-EST. PATIENT-LVL III: ICD-10-PCS | Mod: PBBFAC,HCNC,, | Performed by: FAMILY MEDICINE

## 2020-02-17 PROCEDURE — 83036 HEMOGLOBIN GLYCOSYLATED A1C: CPT | Mod: HCNC

## 2020-02-17 PROCEDURE — 1159F MED LIST DOCD IN RCRD: CPT | Mod: HCNC,S$GLB,, | Performed by: FAMILY MEDICINE

## 2020-02-17 PROCEDURE — 3074F PR MOST RECENT SYSTOLIC BLOOD PRESSURE < 130 MM HG: ICD-10-PCS | Mod: HCNC,CPTII,S$GLB, | Performed by: FAMILY MEDICINE

## 2020-02-17 PROCEDURE — 3078F DIAST BP <80 MM HG: CPT | Mod: HCNC,CPTII,S$GLB, | Performed by: FAMILY MEDICINE

## 2020-02-17 PROCEDURE — 36415 COLL VENOUS BLD VENIPUNCTURE: CPT | Mod: HCNC,PO

## 2020-02-17 PROCEDURE — 1101F PT FALLS ASSESS-DOCD LE1/YR: CPT | Mod: HCNC,CPTII,S$GLB, | Performed by: FAMILY MEDICINE

## 2020-02-17 PROCEDURE — 1125F PR PAIN SEVERITY QUANTIFIED, PAIN PRESENT: ICD-10-PCS | Mod: HCNC,S$GLB,, | Performed by: FAMILY MEDICINE

## 2020-02-17 PROCEDURE — 1159F PR MEDICATION LIST DOCUMENTED IN MEDICAL RECORD: ICD-10-PCS | Mod: HCNC,S$GLB,, | Performed by: FAMILY MEDICINE

## 2020-02-17 NOTE — PROGRESS NOTES
"Subjective:      Patient ID: Kailey Stokes is a 69 y.o. female.    Chief Complaint:   F/U chronic conditions    HPI  70 yo female here for f/u on chronic conditions.  She lost her  from the flu on Nov 1.  She has good family support and is doing as best as she can.  She does try to stay active.  She had recent thyroid US//small nodules.  Nothing large enough to biopsy.  Has a small bump on her lower leg.  No pain, some yellow/bruising.  Unsure of injury?    Past Medical History:   Diagnosis Date    Abnormal Pap smear     Anxiety     Dr. Bose    Breast disorder     Pain in left breast    Family history of colonic polyps 10/18/2018    Her brother and sister have both had colon polyps.    Hyperlipidemia     Hypertension      Family History   Problem Relation Age of Onset    Diabetes Mother     Glaucoma Mother     Hypertension Father     Heart disease Father     Breast cancer Maternal Aunt     Diabetes Sister     Glaucoma Sister     Lymphoma Sister     Diabetes Brother      Past Surgical History:   Procedure Laterality Date    COLONOSCOPY N/A 10/18/2018    Procedure: COLONOSCOPY;  Surgeon: Jayjay Grier MD;  Location: [facility];  Service: Endoscopy;  Laterality: N/A;    HYSTERECTOMY      KNEE SURGERY      TOTAL ABDOMINAL HYSTERECTOMY W/ BILATERAL SALPINGOOPHORECTOMY       Social History     Tobacco Use    Smoking status: Never Smoker    Smokeless tobacco: Never Used   Substance Use Topics    Alcohol use: Yes     Comment: occ use    Drug use: No       /64   Pulse 62   Temp 98 °F (36.7 °C)   Ht 5' 9" (1.753 m)   Wt 90.8 kg (200 lb 2.8 oz)   BMI 29.56 kg/m²     Review of Systems   Constitutional: Negative for activity change, appetite change, chills, diaphoresis, fatigue, fever and unexpected weight change.   HENT: Negative for ear pain, hearing loss, postnasal drip, rhinorrhea and tinnitus.    Eyes: Negative for visual disturbance.   Respiratory: Negative for cough, " shortness of breath and wheezing.    Cardiovascular: Negative for chest pain, palpitations and leg swelling.   Gastrointestinal: Negative for abdominal distention.   Genitourinary: Negative for dysuria, frequency, hematuria and urgency.   Neurological: Negative for weakness and headaches.   Psychiatric/Behavioral: Negative for decreased concentration.       Objective:     Physical Exam   Constitutional: She is oriented to person, place, and time. She appears well-developed and well-nourished. No distress.   HENT:   Right Ear: External ear normal.   Left Ear: External ear normal.   Nose: Nose normal.   Mouth/Throat: Oropharynx is clear and moist.   Eyes: Pupils are equal, round, and reactive to light. Conjunctivae are normal.   Neck: Normal range of motion. Neck supple. Carotid bruit is not present.   Cardiovascular: Normal rate, regular rhythm and normal heart sounds.   Pulmonary/Chest: Effort normal and breath sounds normal. No respiratory distress. She has no wheezes. She has no rales.   Abdominal: Soft. Bowel sounds are normal. She exhibits no distension. There is no tenderness. There is no guarding.   Musculoskeletal: She exhibits no edema.   Right lower leg with palpable/firm nodule.  Some bruising noted/non-tender   Neurological: She is alert and oriented to person, place, and time. No cranial nerve deficit.   Skin: Skin is warm and dry. No rash noted.   Psychiatric: She has a normal mood and affect. Her behavior is normal. Judgment and thought content normal.   Nursing note and vitals reviewed.      Lab Results   Component Value Date    WBC 5.64 09/23/2019    HGB 13.8 09/23/2019    HCT 39.4 09/23/2019     09/23/2019    CHOL 233 (H) 06/25/2019    TRIG 187 (H) 06/25/2019    HDL 55 06/25/2019    ALT 9 (L) 09/23/2019    AST 18 09/23/2019     09/23/2019    K 4.6 09/23/2019     09/23/2019    CREATININE 1.0 11/12/2019    BUN 14 09/23/2019    CO2 24 09/23/2019    TSH 1.407 06/25/2019    INR 1.0  09/23/2019    HGBA1C 5.9 (H) 11/12/2019       Assessment:     1. Abnormal glucose    2. Essential hypertension    3. Vitamin D deficiency    4. Cold intolerance         Plan:     Abnormal glucose  -     Hemoglobin A1c; Future; Expected date: 02/17/2020    Essential hypertension    Vitamin D deficiency  -     Vitamin D; Future; Expected date: 02/17/2020    Cold intolerance  -     TSH; Future; Expected date: 02/17/2020    Update labs today  Focus on good eating/exercise  Cont to stay active  Cont current meds/BP stable.  Suspect anxiety makes it go up//mentioned by pt  Using klonopin PRN  Follow up in about 6 months (around 8/17/2020), or if symptoms worsen or fail to improve.

## 2020-02-18 LAB
ESTIMATED AVG GLUCOSE: 123 MG/DL (ref 68–131)
HBA1C MFR BLD HPLC: 5.9 % (ref 4–5.6)

## 2020-03-16 RX ORDER — CLONAZEPAM 0.5 MG/1
TABLET ORAL
Qty: 30 TABLET | Refills: 1 | Status: SHIPPED | OUTPATIENT
Start: 2020-03-16 | End: 2020-07-22

## 2020-04-24 ENCOUNTER — PATIENT MESSAGE (OUTPATIENT)
Dept: ADMINISTRATIVE | Facility: OTHER | Age: 70
End: 2020-04-24

## 2020-06-30 ENCOUNTER — TELEPHONE (OUTPATIENT)
Dept: INTERNAL MEDICINE | Facility: CLINIC | Age: 70
End: 2020-06-30

## 2020-06-30 NOTE — TELEPHONE ENCOUNTER
Pt rescheduled----- Message from BECKA Mays sent at 6/30/2020  6:36 AM CDT -----  Regarding: FW: Appointment  Contact: pt    ----- Message -----  From: Shawanda Saleh  Sent: 6/29/2020  10:35 AM CDT  To: [facility] Staff  Subject: Appointment                                      Stated she will like to change her appointment , The [place] office is to far for her to come, she can be reached at  [phone]

## 2020-08-17 ENCOUNTER — LAB VISIT (OUTPATIENT)
Dept: LAB | Facility: HOSPITAL | Age: 70
End: 2020-08-17
Attending: FAMILY MEDICINE
Payer: MEDICARE

## 2020-08-17 ENCOUNTER — OFFICE VISIT (OUTPATIENT)
Dept: INTERNAL MEDICINE | Facility: CLINIC | Age: 70
End: 2020-08-17
Payer: MEDICARE

## 2020-08-17 VITALS
BODY MASS INDEX: 28.52 KG/M2 | TEMPERATURE: 98 F | DIASTOLIC BLOOD PRESSURE: 76 MMHG | HEART RATE: 86 BPM | SYSTOLIC BLOOD PRESSURE: 120 MMHG | WEIGHT: 193.13 LBS | RESPIRATION RATE: 16 BRPM

## 2020-08-17 DIAGNOSIS — E55.9 VITAMIN D DEFICIENCY: ICD-10-CM

## 2020-08-17 DIAGNOSIS — I10 HYPERTENSION, UNSPECIFIED TYPE: ICD-10-CM

## 2020-08-17 DIAGNOSIS — Z00.00 ANNUAL PHYSICAL EXAM: Primary | ICD-10-CM

## 2020-08-17 DIAGNOSIS — R73.09 ABNORMAL GLUCOSE: ICD-10-CM

## 2020-08-17 DIAGNOSIS — E78.5 HYPERLIPIDEMIA, UNSPECIFIED HYPERLIPIDEMIA TYPE: ICD-10-CM

## 2020-08-17 LAB
25(OH)D3+25(OH)D2 SERPL-MCNC: 36 NG/ML (ref 30–96)
BASOPHILS # BLD AUTO: 0.04 K/UL (ref 0–0.2)
BASOPHILS NFR BLD: 0.8 % (ref 0–1.9)
CHOLEST SERPL-MCNC: 236 MG/DL (ref 120–199)
CHOLEST/HDLC SERPL: 4.1 {RATIO} (ref 2–5)
DIFFERENTIAL METHOD: NORMAL
EOSINOPHIL # BLD AUTO: 0.1 K/UL (ref 0–0.5)
EOSINOPHIL NFR BLD: 1.9 % (ref 0–8)
ERYTHROCYTE [DISTWIDTH] IN BLOOD BY AUTOMATED COUNT: 14.3 % (ref 11.5–14.5)
HCT VFR BLD AUTO: 41.7 % (ref 37–48.5)
HDLC SERPL-MCNC: 58 MG/DL (ref 40–75)
HDLC SERPL: 24.6 % (ref 20–50)
HGB BLD-MCNC: 13.8 G/DL (ref 12–16)
IMM GRANULOCYTES # BLD AUTO: 0 K/UL (ref 0–0.04)
IMM GRANULOCYTES NFR BLD AUTO: 0 % (ref 0–0.5)
LDLC SERPL CALC-MCNC: 142.2 MG/DL (ref 63–159)
LYMPHOCYTES # BLD AUTO: 1.8 K/UL (ref 1–4.8)
LYMPHOCYTES NFR BLD: 34.6 % (ref 18–48)
MCH RBC QN AUTO: 29.2 PG (ref 27–31)
MCHC RBC AUTO-ENTMCNC: 33.1 G/DL (ref 32–36)
MCV RBC AUTO: 88 FL (ref 82–98)
MONOCYTES # BLD AUTO: 0.4 K/UL (ref 0.3–1)
MONOCYTES NFR BLD: 7.4 % (ref 4–15)
NEUTROPHILS # BLD AUTO: 2.9 K/UL (ref 1.8–7.7)
NEUTROPHILS NFR BLD: 55.3 % (ref 38–73)
NONHDLC SERPL-MCNC: 178 MG/DL
NRBC BLD-RTO: 0 /100 WBC
PLATELET # BLD AUTO: 269 K/UL (ref 150–350)
PMV BLD AUTO: 10.5 FL (ref 9.2–12.9)
RBC # BLD AUTO: 4.72 M/UL (ref 4–5.4)
TRIGL SERPL-MCNC: 179 MG/DL (ref 30–150)
TSH SERPL DL<=0.005 MIU/L-ACNC: 1.14 UIU/ML (ref 0.4–4)
WBC # BLD AUTO: 5.29 K/UL (ref 3.9–12.7)

## 2020-08-17 PROCEDURE — 83036 HEMOGLOBIN GLYCOSYLATED A1C: CPT | Mod: HCNC

## 2020-08-17 PROCEDURE — 99397 PER PM REEVAL EST PAT 65+ YR: CPT | Mod: HCNC,S$GLB,, | Performed by: FAMILY MEDICINE

## 2020-08-17 PROCEDURE — 3078F DIAST BP <80 MM HG: CPT | Mod: HCNC,CPTII,S$GLB, | Performed by: FAMILY MEDICINE

## 2020-08-17 PROCEDURE — 99999 PR PBB SHADOW E&M-EST. PATIENT-LVL IV: CPT | Mod: PBBFAC,HCNC,, | Performed by: FAMILY MEDICINE

## 2020-08-17 PROCEDURE — 99499 RISK ADDL DX/OHS AUDIT: ICD-10-PCS | Mod: HCNC,S$GLB,, | Performed by: FAMILY MEDICINE

## 2020-08-17 PROCEDURE — 80061 LIPID PANEL: CPT | Mod: HCNC

## 2020-08-17 PROCEDURE — 36415 COLL VENOUS BLD VENIPUNCTURE: CPT | Mod: HCNC,PO

## 2020-08-17 PROCEDURE — 99499 UNLISTED E&M SERVICE: CPT | Mod: HCNC,S$GLB,, | Performed by: FAMILY MEDICINE

## 2020-08-17 PROCEDURE — 3074F PR MOST RECENT SYSTOLIC BLOOD PRESSURE < 130 MM HG: ICD-10-PCS | Mod: HCNC,CPTII,S$GLB, | Performed by: FAMILY MEDICINE

## 2020-08-17 PROCEDURE — 3078F PR MOST RECENT DIASTOLIC BLOOD PRESSURE < 80 MM HG: ICD-10-PCS | Mod: HCNC,CPTII,S$GLB, | Performed by: FAMILY MEDICINE

## 2020-08-17 PROCEDURE — 99999 PR PBB SHADOW E&M-EST. PATIENT-LVL IV: ICD-10-PCS | Mod: PBBFAC,HCNC,, | Performed by: FAMILY MEDICINE

## 2020-08-17 PROCEDURE — 84443 ASSAY THYROID STIM HORMONE: CPT | Mod: HCNC

## 2020-08-17 PROCEDURE — 99397 PR PREVENTIVE VISIT,EST,65 & OVER: ICD-10-PCS | Mod: HCNC,S$GLB,, | Performed by: FAMILY MEDICINE

## 2020-08-17 PROCEDURE — 80053 COMPREHEN METABOLIC PANEL: CPT | Mod: HCNC

## 2020-08-17 PROCEDURE — 82306 VITAMIN D 25 HYDROXY: CPT | Mod: HCNC

## 2020-08-17 PROCEDURE — 85025 COMPLETE CBC W/AUTO DIFF WBC: CPT | Mod: HCNC

## 2020-08-17 PROCEDURE — 3074F SYST BP LT 130 MM HG: CPT | Mod: HCNC,CPTII,S$GLB, | Performed by: FAMILY MEDICINE

## 2020-08-17 RX ORDER — VALACYCLOVIR HYDROCHLORIDE 500 MG/1
TABLET, FILM COATED ORAL
COMMUNITY
Start: 2020-08-04 | End: 2021-02-18

## 2020-08-17 NOTE — PROGRESS NOTES
Subjective:      Patient ID: Kailey Stokes is a 70 y.o. female.    Chief Complaint:  Annual review    HPI  71 yo female here for annual visit.  She is doing ok//staying in during Covid.  Lost  end of last year, doing ok.  Good family support.  Was having leg pains//saw Neuro.  Has a thiamine def and is on supplements.  Will be following him with them soon.    Past Medical History:   Diagnosis Date    Abnormal Pap smear     Anxiety     Dr. Bose    Breast disorder     Pain in left breast    Family history of colonic polyps 10/18/2018    Her brother and sister have both had colon polyps.    Hyperlipidemia     Hypertension      Family History   Problem Relation Age of Onset    Diabetes Mother     Glaucoma Mother     Hypertension Father     Heart disease Father     Breast cancer Maternal Aunt     Diabetes Sister     Glaucoma Sister     Lymphoma Sister     Diabetes Brother      Past Surgical History:   Procedure Laterality Date    COLONOSCOPY N/A 10/18/2018    Procedure: COLONOSCOPY;  Surgeon: Jayjay Grier MD;  Location: [facility];  Service: Endoscopy;  Laterality: N/A;    HYSTERECTOMY      KNEE SURGERY      TOTAL ABDOMINAL HYSTERECTOMY W/ BILATERAL SALPINGOOPHORECTOMY       Social History     Tobacco Use    Smoking status: Never Smoker    Smokeless tobacco: Never Used   Substance Use Topics    Alcohol use: Yes     Frequency: Monthly or less     Drinks per session: 1 or 2     Binge frequency: Never     Comment: occ use    Drug use: No       /76 (BP Location: Left arm, Patient Position: Sitting)   Pulse 86   Temp 97.7 °F (36.5 °C) (Temporal)   Resp 16   Wt 87.6 kg (193 lb 2 oz)   BMI 28.52 kg/m²     Review of Systems   Constitutional: Negative for activity change, appetite change, chills, diaphoresis, fatigue, fever and unexpected weight change.   HENT: Negative for ear pain, hearing loss, postnasal drip, rhinorrhea and tinnitus.    Eyes: Negative for visual disturbance.    Respiratory: Negative for cough, shortness of breath and wheezing.    Cardiovascular: Negative for chest pain, palpitations and leg swelling.   Gastrointestinal: Negative for abdominal distention, abdominal pain, constipation and diarrhea.   Genitourinary: Negative for dysuria, frequency, hematuria and urgency.   Musculoskeletal: Negative for back pain and joint swelling.   Neurological: Negative for weakness and headaches.   Hematological: Negative for adenopathy.   Psychiatric/Behavioral: Negative for confusion and decreased concentration.       Objective:     Physical Exam  Vitals signs and nursing note reviewed.   Constitutional:       General: She is not in acute distress.     Appearance: She is well-developed.   HENT:      Right Ear: External ear normal.      Left Ear: External ear normal.   Eyes:      Conjunctiva/sclera: Conjunctivae normal.      Pupils: Pupils are equal, round, and reactive to light.   Neck:      Musculoskeletal: Normal range of motion and neck supple.      Thyroid: No thyromegaly.   Cardiovascular:      Rate and Rhythm: Normal rate and regular rhythm.      Heart sounds: Normal heart sounds.   Pulmonary:      Effort: Pulmonary effort is normal. No respiratory distress.      Breath sounds: Normal breath sounds. No wheezing or rales.   Abdominal:      General: Bowel sounds are normal. There is no distension.      Palpations: Abdomen is soft.      Tenderness: There is no abdominal tenderness. There is no guarding.   Musculoskeletal:      Right lower leg: No edema.      Left lower leg: No edema.   Skin:     General: Skin is warm and dry.      Findings: No rash.   Neurological:      Mental Status: She is alert and oriented to person, place, and time.      Cranial Nerves: No cranial nerve deficit.   Psychiatric:         Mood and Affect: Mood normal.         Behavior: Behavior normal.         Thought Content: Thought content normal.         Judgment: Judgment normal.         Lab Results    Component Value Date    WBC 5.64 09/23/2019    HGB 13.8 09/23/2019    HCT 39.4 09/23/2019     09/23/2019    CHOL 233 (H) 06/25/2019    TRIG 187 (H) 06/25/2019    HDL 55 06/25/2019    ALT 9 (L) 09/23/2019    AST 18 09/23/2019     09/23/2019    K 4.6 09/23/2019     09/23/2019    CREATININE 1.0 11/12/2019    BUN 14 09/23/2019    CO2 24 09/23/2019    TSH 1.033 02/17/2020    INR 1.0 09/23/2019    HGBA1C 5.9 (H) 02/17/2020       Assessment:     1. Annual physical exam    2. Hypertension, unspecified type    3. Hyperlipidemia, unspecified hyperlipidemia type    4. Abnormal glucose    5. Vitamin D deficiency         Plan:     Annual physical exam    Hypertension, unspecified type  -     CBC auto differential; Future; Expected date: 08/17/2020  -     Comprehensive metabolic panel; Future; Expected date: 08/17/2020    Hyperlipidemia, unspecified hyperlipidemia type  -     Lipid Panel; Future; Expected date: 08/17/2020  -     TSH; Future; Expected date: 08/17/2020    Abnormal glucose  -     Hemoglobin A1C; Future; Expected date: 08/17/2020    Vitamin D deficiency  -     Vitamin D; Future; Expected date: 08/17/2020      Update all labs  Vital are stable  Cont current meds  Healthy diet/exercise  Cont with specialty  F/u annually and PRN

## 2020-08-18 LAB
ALBUMIN SERPL BCP-MCNC: 4.5 G/DL (ref 3.5–5.2)
ALP SERPL-CCNC: 91 U/L (ref 55–135)
ALT SERPL W/O P-5'-P-CCNC: 11 U/L (ref 10–44)
ANION GAP SERPL CALC-SCNC: 8 MMOL/L (ref 8–16)
AST SERPL-CCNC: 19 U/L (ref 10–40)
BILIRUB SERPL-MCNC: 0.6 MG/DL (ref 0.1–1)
BUN SERPL-MCNC: 14 MG/DL (ref 8–23)
CALCIUM SERPL-MCNC: 11.1 MG/DL (ref 8.7–10.5)
CHLORIDE SERPL-SCNC: 107 MMOL/L (ref 95–110)
CO2 SERPL-SCNC: 29 MMOL/L (ref 23–29)
CREAT SERPL-MCNC: 1.1 MG/DL (ref 0.5–1.4)
EST. GFR  (AFRICAN AMERICAN): 59.6 ML/MIN/1.73 M^2
EST. GFR  (NON AFRICAN AMERICAN): 51.7 ML/MIN/1.73 M^2
ESTIMATED AVG GLUCOSE: 117 MG/DL (ref 68–131)
GLUCOSE SERPL-MCNC: 88 MG/DL (ref 70–110)
HBA1C MFR BLD HPLC: 5.7 % (ref 4–5.6)
POTASSIUM SERPL-SCNC: 4.3 MMOL/L (ref 3.5–5.1)
PROT SERPL-MCNC: 7.1 G/DL (ref 6–8.4)
SODIUM SERPL-SCNC: 144 MMOL/L (ref 136–145)

## 2020-08-25 ENCOUNTER — PATIENT MESSAGE (OUTPATIENT)
Dept: INTERNAL MEDICINE | Facility: CLINIC | Age: 70
End: 2020-08-25

## 2020-08-25 DIAGNOSIS — E83.52 HYPERCALCEMIA: Primary | ICD-10-CM

## 2020-09-01 ENCOUNTER — LAB VISIT (OUTPATIENT)
Dept: LAB | Facility: HOSPITAL | Age: 70
End: 2020-09-01
Attending: FAMILY MEDICINE
Payer: MEDICARE

## 2020-09-01 DIAGNOSIS — E83.52 HYPERCALCEMIA: ICD-10-CM

## 2020-09-01 PROCEDURE — 36415 COLL VENOUS BLD VENIPUNCTURE: CPT | Mod: PO

## 2020-09-01 PROCEDURE — 80048 BASIC METABOLIC PNL TOTAL CA: CPT | Mod: HCNC

## 2020-09-02 LAB
ANION GAP SERPL CALC-SCNC: 9 MMOL/L (ref 8–16)
BUN SERPL-MCNC: 12 MG/DL (ref 8–23)
CALCIUM SERPL-MCNC: 9.3 MG/DL (ref 8.7–10.5)
CHLORIDE SERPL-SCNC: 108 MMOL/L (ref 95–110)
CO2 SERPL-SCNC: 27 MMOL/L (ref 23–29)
CREAT SERPL-MCNC: 1 MG/DL (ref 0.5–1.4)
EST. GFR  (AFRICAN AMERICAN): >60 ML/MIN/1.73 M^2
EST. GFR  (NON AFRICAN AMERICAN): 58 ML/MIN/1.73 M^2
GLUCOSE SERPL-MCNC: 83 MG/DL (ref 70–110)
POTASSIUM SERPL-SCNC: 4 MMOL/L (ref 3.5–5.1)
SODIUM SERPL-SCNC: 144 MMOL/L (ref 136–145)

## 2020-09-16 ENCOUNTER — DOCUMENTATION ONLY (OUTPATIENT)
Dept: OTHER | Facility: OTHER | Age: 70
End: 2020-09-16

## 2020-09-29 ENCOUNTER — PATIENT MESSAGE (OUTPATIENT)
Dept: OTHER | Facility: OTHER | Age: 70
End: 2020-09-29

## 2020-10-20 ENCOUNTER — TELEPHONE (OUTPATIENT)
Dept: INTERNAL MEDICINE | Facility: CLINIC | Age: 70
End: 2020-10-20

## 2020-10-20 DIAGNOSIS — E04.2 MULTIPLE THYROID NODULES: Primary | ICD-10-CM

## 2020-10-20 NOTE — TELEPHONE ENCOUNTER
Pt said, she had an ultrasound last December and was told she has thyroid nodules. She is supposed to have an ultrasound done once a year to recheck nodules. She doesn't know who she's supposed to have order this or what doctor to see. She saw Dr. Ontiveros last, but doesn't want to see him. Who does she need to f/u with for the thyroid nodules?

## 2020-10-20 NOTE — TELEPHONE ENCOUNTER
----- Message from Zonia De Leon sent at 10/20/2020  9:48 AM CDT -----  Regarding: Sooner appointment  Type:  Sooner Apoointment Request    Caller is requesting a sooner appointment.  Caller declined first available appointment listed below.  Caller will not accept being placed on the waitlist and is requesting a message be sent to doctor.  Name of Caller: Pt   When is the first available appointment?11/02/2020  Symptoms: nodule dis comfort   Would the patient rather a call back or a response via [facility]? Call back   Best Call Back Number: [phone]   Additional Information: n/a

## 2020-12-11 ENCOUNTER — PATIENT MESSAGE (OUTPATIENT)
Dept: OTHER | Facility: OTHER | Age: 70
End: 2020-12-11

## 2020-12-16 ENCOUNTER — TELEPHONE (OUTPATIENT)
Dept: RADIOLOGY | Facility: HOSPITAL | Age: 70
End: 2020-12-16

## 2020-12-17 ENCOUNTER — HOSPITAL ENCOUNTER (OUTPATIENT)
Dept: RADIOLOGY | Facility: HOSPITAL | Age: 70
Discharge: HOME | End: 2020-12-17
Attending: FAMILY MEDICINE
Payer: MEDICARE

## 2020-12-17 DIAGNOSIS — E04.2 MULTIPLE THYROID NODULES: ICD-10-CM

## 2020-12-17 PROCEDURE — 76536 US SOFT TISSUE HEAD NECK THYROID: ICD-10-PCS | Mod: 26,HCNC,, | Performed by: RADIOLOGY

## 2020-12-17 PROCEDURE — 76536 US EXAM OF HEAD AND NECK: CPT | Mod: 26,HCNC,, | Performed by: RADIOLOGY

## 2020-12-17 PROCEDURE — 76536 US EXAM OF HEAD AND NECK: CPT | Mod: TC,HCNC

## 2020-12-21 ENCOUNTER — OFFICE VISIT (OUTPATIENT)
Dept: INTERNAL MEDICINE | Facility: CLINIC | Age: 70
End: 2020-12-21
Payer: MEDICARE

## 2020-12-21 VITALS
DIASTOLIC BLOOD PRESSURE: 72 MMHG | TEMPERATURE: 97 F | SYSTOLIC BLOOD PRESSURE: 116 MMHG | HEART RATE: 78 BPM | WEIGHT: 195.56 LBS | BODY MASS INDEX: 28.96 KG/M2 | HEIGHT: 69 IN

## 2020-12-21 DIAGNOSIS — R59.0 CERVICAL LYMPHADENOPATHY: ICD-10-CM

## 2020-12-21 DIAGNOSIS — E04.2 MULTIPLE THYROID NODULES: ICD-10-CM

## 2020-12-21 DIAGNOSIS — F51.3 SLEEP WALKING: ICD-10-CM

## 2020-12-21 DIAGNOSIS — J01.40 ACUTE NON-RECURRENT PANSINUSITIS: Primary | ICD-10-CM

## 2020-12-21 PROCEDURE — 1159F PR MEDICATION LIST DOCUMENTED IN MEDICAL RECORD: ICD-10-PCS | Mod: HCNC,S$GLB,, | Performed by: NURSE PRACTITIONER

## 2020-12-21 PROCEDURE — 3074F PR MOST RECENT SYSTOLIC BLOOD PRESSURE < 130 MM HG: ICD-10-PCS | Mod: HCNC,CPTII,S$GLB, | Performed by: NURSE PRACTITIONER

## 2020-12-21 PROCEDURE — 99999 PR PBB SHADOW E&M-EST. PATIENT-LVL IV: ICD-10-PCS | Mod: PBBFAC,HCNC,, | Performed by: NURSE PRACTITIONER

## 2020-12-21 PROCEDURE — 99214 OFFICE O/P EST MOD 30 MIN: CPT | Mod: HCNC,S$GLB,, | Performed by: NURSE PRACTITIONER

## 2020-12-21 PROCEDURE — 1125F AMNT PAIN NOTED PAIN PRSNT: CPT | Mod: HCNC,S$GLB,, | Performed by: NURSE PRACTITIONER

## 2020-12-21 PROCEDURE — 3078F DIAST BP <80 MM HG: CPT | Mod: HCNC,CPTII,S$GLB, | Performed by: NURSE PRACTITIONER

## 2020-12-21 PROCEDURE — 1101F PT FALLS ASSESS-DOCD LE1/YR: CPT | Mod: HCNC,CPTII,S$GLB, | Performed by: NURSE PRACTITIONER

## 2020-12-21 PROCEDURE — 3288F PR FALLS RISK ASSESSMENT DOCUMENTED: ICD-10-PCS | Mod: HCNC,CPTII,S$GLB, | Performed by: NURSE PRACTITIONER

## 2020-12-21 PROCEDURE — 99999 PR PBB SHADOW E&M-EST. PATIENT-LVL IV: CPT | Mod: PBBFAC,HCNC,, | Performed by: NURSE PRACTITIONER

## 2020-12-21 PROCEDURE — 3078F PR MOST RECENT DIASTOLIC BLOOD PRESSURE < 80 MM HG: ICD-10-PCS | Mod: HCNC,CPTII,S$GLB, | Performed by: NURSE PRACTITIONER

## 2020-12-21 PROCEDURE — 1101F PR PT FALLS ASSESS DOC 0-1 FALLS W/OUT INJ PAST YR: ICD-10-PCS | Mod: HCNC,CPTII,S$GLB, | Performed by: NURSE PRACTITIONER

## 2020-12-21 PROCEDURE — 3074F SYST BP LT 130 MM HG: CPT | Mod: HCNC,CPTII,S$GLB, | Performed by: NURSE PRACTITIONER

## 2020-12-21 PROCEDURE — 3008F BODY MASS INDEX DOCD: CPT | Mod: HCNC,CPTII,S$GLB, | Performed by: NURSE PRACTITIONER

## 2020-12-21 PROCEDURE — 3288F FALL RISK ASSESSMENT DOCD: CPT | Mod: HCNC,CPTII,S$GLB, | Performed by: NURSE PRACTITIONER

## 2020-12-21 PROCEDURE — 3008F PR BODY MASS INDEX (BMI) DOCUMENTED: ICD-10-PCS | Mod: HCNC,CPTII,S$GLB, | Performed by: NURSE PRACTITIONER

## 2020-12-21 PROCEDURE — 1159F MED LIST DOCD IN RCRD: CPT | Mod: HCNC,S$GLB,, | Performed by: NURSE PRACTITIONER

## 2020-12-21 PROCEDURE — 1125F PR PAIN SEVERITY QUANTIFIED, PAIN PRESENT: ICD-10-PCS | Mod: HCNC,S$GLB,, | Performed by: NURSE PRACTITIONER

## 2020-12-21 PROCEDURE — 99214 PR OFFICE/OUTPT VISIT, EST, LEVL IV, 30-39 MIN: ICD-10-PCS | Mod: HCNC,S$GLB,, | Performed by: NURSE PRACTITIONER

## 2020-12-21 RX ORDER — FLUTICASONE PROPIONATE 50 MCG
2 SPRAY, SUSPENSION (ML) NASAL DAILY
Qty: 16 G | Refills: 11 | Status: SHIPPED | OUTPATIENT
Start: 2020-12-21 | End: 2021-12-27

## 2020-12-21 RX ORDER — THIAMINE HCL 500 MG
500 TABLET ORAL DAILY
COMMUNITY

## 2020-12-21 RX ORDER — AMOXICILLIN AND CLAVULANATE POTASSIUM 875; 125 MG/1; MG/1
1 TABLET, FILM COATED ORAL 2 TIMES DAILY
Qty: 20 TABLET | Refills: 0 | Status: SHIPPED | OUTPATIENT
Start: 2020-12-21 | End: 2020-12-31

## 2020-12-21 NOTE — PROGRESS NOTES
"Subjective:       Patient ID: Kailey Stokes is a 70 y.o. female.    Chief Complaint: Thyroid Problem    Patient here today for multiple concerns  1) has hx of thyroid nodules, ultrasound was done recently. Here to review the results which were stable, repeat in 1 year as recommended by Dr. ROWE  2) has a lump to left neck under jaw with post nasal drip and sinus pain/tooth pain/pressure x 2 months.   3) has slept walked 3 times recently. Woke up standing in her bathroom once, one time she noticed clothes were removed from her closet and placed elsewear, remembered vaguely doing it in her sleep, remembers she tried to get out of her bedroom but could not because she has a door stop. Since these events she stopped taking hs gabapentin. Admits she takes clonazepam in the evenings as well      /72   Pulse 78   Temp 97.2 °F (36.2 °C) (Temporal)   Ht 5' 9" (1.753 m)   Wt 88.7 kg (195 lb 8.8 oz)   BMI 28.88 kg/m²     Review of Systems   Constitutional: Negative for activity change, appetite change, chills, diaphoresis, fatigue and fever.   HENT: Positive for dental problem and postnasal drip.    Eyes: Negative for visual disturbance.   Respiratory: Negative for cough, shortness of breath and wheezing.    Cardiovascular: Negative for chest pain, palpitations and leg swelling.   Gastrointestinal: Negative for abdominal distention, abdominal pain, blood in stool, constipation, diarrhea, nausea and vomiting.   Genitourinary: Negative for decreased urine volume, difficulty urinating, dysuria, frequency, hematuria and urgency.   Neurological: Negative.  Negative for dizziness, syncope, speech difficulty, light-headedness and headaches.   Hematological: Positive for adenopathy.   Psychiatric/Behavioral: Positive for sleep disturbance. Negative for agitation, confusion and hallucinations. The patient is not nervous/anxious.        Objective:      Physical Exam  Vitals signs and nursing note reviewed.   Constitutional:     "   General: She is not in acute distress.     Appearance: She is well-developed. She is not diaphoretic.   HENT:      Head: Normocephalic and atraumatic.      Right Ear: Tympanic membrane, ear canal and external ear normal.      Left Ear: Tympanic membrane, ear canal and external ear normal.      Nose:      Right Sinus: Maxillary sinus tenderness and frontal sinus tenderness present.      Left Sinus: Maxillary sinus tenderness and frontal sinus tenderness present.      Mouth/Throat:      Lips: Pink.      Pharynx: Oropharynx is clear.   Eyes:      General:         Right eye: No discharge.         Left eye: No discharge.      Conjunctiva/sclera: Conjunctivae normal.   Cardiovascular:      Rate and Rhythm: Normal rate and regular rhythm.      Heart sounds: Normal heart sounds. No murmur.   Pulmonary:      Effort: Pulmonary effort is normal. No respiratory distress.      Breath sounds: Normal breath sounds. No wheezing or rales.   Chest:      Chest wall: No tenderness.   Abdominal:      General: There is no distension.      Palpations: Abdomen is soft.   Musculoskeletal: Normal range of motion.   Lymphadenopathy:      Cervical: Cervical adenopathy present.      Right cervical: Superficial cervical adenopathy present. No deep or posterior cervical adenopathy.     Left cervical: Superficial cervical adenopathy present. No deep or posterior cervical adenopathy.   Skin:     General: Skin is warm and dry.      Findings: No rash.   Neurological:      Mental Status: She is alert and oriented to person, place, and time.   Psychiatric:         Behavior: Behavior normal. Behavior is cooperative.         Thought Content: Thought content normal.         Judgment: Judgment normal.         Assessment:       1. Acute non-recurrent pansinusitis    2. Multiple thyroid nodules    3. Cervical lymphadenopathy    4. Sleep walking        Plan:       Kailey was seen today for thyroid problem.    Diagnoses and all orders for this  visit:    Acute non-recurrent pansinusitis  -     amoxicillin-clavulanate 875-125mg (AUGMENTIN) 875-125 mg per tablet; Take 1 tablet by mouth 2 (two) times daily. for 10 days  -     fluticasone propionate (FLONASE) 50 mcg/actuation nasal spray; 2 sprays (100 mcg total) by Each Nostril route once daily.    Multiple thyroid nodules- stable, reviewed imaging, repeat 1 year    Cervical lymphadenopathy- likely from sinuses and post nasal drip, treat with meds as above    Sleep walking- continue to hold gabapentin. Will reach out to Dr. ROWE for further recommendations.

## 2020-12-22 ENCOUNTER — PATIENT OUTREACH (OUTPATIENT)
Dept: ADMINISTRATIVE | Facility: HOSPITAL | Age: 70
End: 2020-12-22

## 2020-12-22 ENCOUNTER — TELEPHONE (OUTPATIENT)
Dept: INTERNAL MEDICINE | Facility: CLINIC | Age: 70
End: 2020-12-22

## 2020-12-22 NOTE — TELEPHONE ENCOUNTER
Inform patient that I spoke with Dr. ROWE about sleep walking and it sounds like it could be a combo of gabapentin and klonopin. Stay off gabapentin and see if sleep walking occurs again

## 2020-12-22 NOTE — TELEPHONE ENCOUNTER
----- Message from Karel Flowers MD sent at 12/22/2020  9:01 AM CST -----  Regarding: RE: sleep walking  Sounds like it is the medication combo.  If stopping gabapentin stopped it, then stay off the medication.  ----- Message -----  From: JOSE JUAN WeberP-C  Sent: 12/21/2020   3:54 PM CST  To: Karel Flowers MD  Subject: sleep walking                                    Patient came in today for follow up thyroid  Also for sinus infection x 2 months  She mentioned she has slept walk 3 times recently  She noticed clothes taken out and then vaguely remembered the next day that she had done it though the night and tried to get out of her room and could not because she had a door jam  She also woke up in her master bathroom   She stopped gabapentin 2 weeks ago and has not slept walked since  Also taking klonopin in evening times for anxiety.   I informed her it may be both gabapentin and klinopin together and to continue to hold the gabapentin until we heard from you  Any recommendations?

## 2021-01-04 ENCOUNTER — TELEPHONE (OUTPATIENT)
Dept: INTERNAL MEDICINE | Facility: CLINIC | Age: 71
End: 2021-01-04

## 2021-01-05 ENCOUNTER — PATIENT MESSAGE (OUTPATIENT)
Dept: INTERNAL MEDICINE | Facility: CLINIC | Age: 71
End: 2021-01-05

## 2021-01-05 DIAGNOSIS — J01.40 ACUTE NON-RECURRENT PANSINUSITIS: Primary | ICD-10-CM

## 2021-01-05 RX ORDER — MONTELUKAST SODIUM 10 MG/1
10 TABLET ORAL NIGHTLY
Qty: 30 TABLET | Refills: 2 | Status: SHIPPED | OUTPATIENT
Start: 2021-01-05 | End: 2021-02-22

## 2021-01-05 RX ORDER — LEVOCETIRIZINE DIHYDROCHLORIDE 5 MG/1
5 TABLET, FILM COATED ORAL NIGHTLY
Qty: 30 TABLET | Refills: 11 | Status: SHIPPED | OUTPATIENT
Start: 2021-01-05 | End: 2021-02-22

## 2021-01-14 ENCOUNTER — PATIENT OUTREACH (OUTPATIENT)
Dept: ADMINISTRATIVE | Facility: OTHER | Age: 71
End: 2021-01-14

## 2021-01-14 ENCOUNTER — OFFICE VISIT (OUTPATIENT)
Dept: OPHTHALMOLOGY | Facility: CLINIC | Age: 71
End: 2021-01-14
Payer: MEDICARE

## 2021-01-14 DIAGNOSIS — H52.03 HYPEROPIA WITH PRESBYOPIA, BILATERAL: ICD-10-CM

## 2021-01-14 DIAGNOSIS — H10.10 SEASONAL ALLERGIC CONJUNCTIVITIS: Primary | ICD-10-CM

## 2021-01-14 DIAGNOSIS — H52.4 HYPEROPIA WITH PRESBYOPIA, BILATERAL: ICD-10-CM

## 2021-01-14 PROCEDURE — 92004 COMPRE OPH EXAM NEW PT 1/>: CPT | Mod: HCNC,S$GLB,, | Performed by: OPTOMETRIST

## 2021-01-14 PROCEDURE — 92015 DETERMINE REFRACTIVE STATE: CPT | Mod: HCNC,S$GLB,, | Performed by: OPTOMETRIST

## 2021-01-14 PROCEDURE — 92015 PR REFRACTION: ICD-10-PCS | Mod: HCNC,S$GLB,, | Performed by: OPTOMETRIST

## 2021-01-14 PROCEDURE — 92004 PR EYE EXAM, NEW PATIENT,COMPREHESV: ICD-10-PCS | Mod: HCNC,S$GLB,, | Performed by: OPTOMETRIST

## 2021-01-14 PROCEDURE — 99999 PR PBB SHADOW E&M-EST. PATIENT-LVL III: ICD-10-PCS | Mod: PBBFAC,HCNC,, | Performed by: OPTOMETRIST

## 2021-01-14 PROCEDURE — 99999 PR PBB SHADOW E&M-EST. PATIENT-LVL III: CPT | Mod: PBBFAC,HCNC,, | Performed by: OPTOMETRIST

## 2021-01-19 ENCOUNTER — PATIENT MESSAGE (OUTPATIENT)
Dept: ADMINISTRATIVE | Facility: OTHER | Age: 71
End: 2021-01-19

## 2021-01-19 ENCOUNTER — PATIENT MESSAGE (OUTPATIENT)
Dept: INTERNAL MEDICINE | Facility: CLINIC | Age: 71
End: 2021-01-19

## 2021-02-18 ENCOUNTER — OFFICE VISIT (OUTPATIENT)
Dept: INTERNAL MEDICINE | Facility: CLINIC | Age: 71
End: 2021-02-18
Payer: MEDICARE

## 2021-02-18 VITALS
BODY MASS INDEX: 29.39 KG/M2 | WEIGHT: 198.44 LBS | HEIGHT: 69 IN | HEART RATE: 78 BPM | DIASTOLIC BLOOD PRESSURE: 70 MMHG | SYSTOLIC BLOOD PRESSURE: 118 MMHG | TEMPERATURE: 97 F

## 2021-02-18 DIAGNOSIS — E78.5 HYPERLIPIDEMIA, UNSPECIFIED HYPERLIPIDEMIA TYPE: ICD-10-CM

## 2021-02-18 DIAGNOSIS — G44.52 NEW DAILY PERSISTENT HEADACHE: ICD-10-CM

## 2021-02-18 DIAGNOSIS — L98.9 SKIN LESIONS: Primary | ICD-10-CM

## 2021-02-18 DIAGNOSIS — R73.09 ABNORMAL GLUCOSE: ICD-10-CM

## 2021-02-18 DIAGNOSIS — R42 DIZZINESS AND GIDDINESS: ICD-10-CM

## 2021-02-18 DIAGNOSIS — I10 HYPERTENSION, UNSPECIFIED TYPE: ICD-10-CM

## 2021-02-18 DIAGNOSIS — M47.812 CERVICAL SPONDYLOSIS: ICD-10-CM

## 2021-02-18 PROCEDURE — 99999 PR PBB SHADOW E&M-EST. PATIENT-LVL V: CPT | Mod: PBBFAC,,, | Performed by: FAMILY MEDICINE

## 2021-02-18 PROCEDURE — 3074F SYST BP LT 130 MM HG: CPT | Mod: CPTII,S$GLB,, | Performed by: FAMILY MEDICINE

## 2021-02-18 PROCEDURE — 1101F PT FALLS ASSESS-DOCD LE1/YR: CPT | Mod: CPTII,S$GLB,, | Performed by: FAMILY MEDICINE

## 2021-02-18 PROCEDURE — 3288F FALL RISK ASSESSMENT DOCD: CPT | Mod: CPTII,S$GLB,, | Performed by: FAMILY MEDICINE

## 2021-02-18 PROCEDURE — 1126F PR PAIN SEVERITY QUANTIFIED, NO PAIN PRESENT: ICD-10-PCS | Mod: S$GLB,,, | Performed by: FAMILY MEDICINE

## 2021-02-18 PROCEDURE — 3074F PR MOST RECENT SYSTOLIC BLOOD PRESSURE < 130 MM HG: ICD-10-PCS | Mod: CPTII,S$GLB,, | Performed by: FAMILY MEDICINE

## 2021-02-18 PROCEDURE — 1159F PR MEDICATION LIST DOCUMENTED IN MEDICAL RECORD: ICD-10-PCS | Mod: S$GLB,,, | Performed by: FAMILY MEDICINE

## 2021-02-18 PROCEDURE — 1159F MED LIST DOCD IN RCRD: CPT | Mod: S$GLB,,, | Performed by: FAMILY MEDICINE

## 2021-02-18 PROCEDURE — 3008F BODY MASS INDEX DOCD: CPT | Mod: CPTII,S$GLB,, | Performed by: FAMILY MEDICINE

## 2021-02-18 PROCEDURE — 3078F DIAST BP <80 MM HG: CPT | Mod: CPTII,S$GLB,, | Performed by: FAMILY MEDICINE

## 2021-02-18 PROCEDURE — 3008F PR BODY MASS INDEX (BMI) DOCUMENTED: ICD-10-PCS | Mod: CPTII,S$GLB,, | Performed by: FAMILY MEDICINE

## 2021-02-18 PROCEDURE — 1101F PR PT FALLS ASSESS DOC 0-1 FALLS W/OUT INJ PAST YR: ICD-10-PCS | Mod: CPTII,S$GLB,, | Performed by: FAMILY MEDICINE

## 2021-02-18 PROCEDURE — 3288F PR FALLS RISK ASSESSMENT DOCUMENTED: ICD-10-PCS | Mod: CPTII,S$GLB,, | Performed by: FAMILY MEDICINE

## 2021-02-18 PROCEDURE — 99214 PR OFFICE/OUTPT VISIT, EST, LEVL IV, 30-39 MIN: ICD-10-PCS | Mod: S$GLB,,, | Performed by: FAMILY MEDICINE

## 2021-02-18 PROCEDURE — 3078F PR MOST RECENT DIASTOLIC BLOOD PRESSURE < 80 MM HG: ICD-10-PCS | Mod: CPTII,S$GLB,, | Performed by: FAMILY MEDICINE

## 2021-02-18 PROCEDURE — 1126F AMNT PAIN NOTED NONE PRSNT: CPT | Mod: S$GLB,,, | Performed by: FAMILY MEDICINE

## 2021-02-18 PROCEDURE — 99214 OFFICE O/P EST MOD 30 MIN: CPT | Mod: S$GLB,,, | Performed by: FAMILY MEDICINE

## 2021-02-18 PROCEDURE — 99999 PR PBB SHADOW E&M-EST. PATIENT-LVL V: ICD-10-PCS | Mod: PBBFAC,,, | Performed by: FAMILY MEDICINE

## 2021-02-22 ENCOUNTER — OFFICE VISIT (OUTPATIENT)
Dept: DERMATOLOGY | Facility: CLINIC | Age: 71
End: 2021-02-22
Payer: MEDICARE

## 2021-02-22 DIAGNOSIS — L98.9 SKIN LESIONS: ICD-10-CM

## 2021-02-22 DIAGNOSIS — D48.5 NEOPLASM OF UNCERTAIN BEHAVIOR OF SKIN: ICD-10-CM

## 2021-02-22 DIAGNOSIS — L82.1 SEBORRHEIC KERATOSIS: Primary | ICD-10-CM

## 2021-02-22 DIAGNOSIS — L70.0 ACNE COMEDONE: ICD-10-CM

## 2021-02-22 PROCEDURE — 11102 PR TANGENTIAL BIOPSY, SKIN, SINGLE LESION: ICD-10-PCS | Mod: S$GLB,,, | Performed by: DERMATOLOGY

## 2021-02-22 PROCEDURE — 11102 TANGNTL BX SKIN SINGLE LES: CPT | Mod: S$GLB,,, | Performed by: DERMATOLOGY

## 2021-02-22 PROCEDURE — 10040 PR ACNE SURGERY OF SKIN ABSCESS: ICD-10-PCS | Mod: 59,S$GLB,, | Performed by: DERMATOLOGY

## 2021-02-22 PROCEDURE — 99999 PR PBB SHADOW E&M-EST. PATIENT-LVL III: CPT | Mod: PBBFAC,,, | Performed by: DERMATOLOGY

## 2021-02-22 PROCEDURE — 1101F PT FALLS ASSESS-DOCD LE1/YR: CPT | Mod: CPTII,S$GLB,, | Performed by: DERMATOLOGY

## 2021-02-22 PROCEDURE — 99999 PR PBB SHADOW E&M-EST. PATIENT-LVL III: ICD-10-PCS | Mod: PBBFAC,,, | Performed by: DERMATOLOGY

## 2021-02-22 PROCEDURE — 88305 TISSUE EXAM BY PATHOLOGIST: CPT | Mod: 26,,, | Performed by: DERMATOLOGY

## 2021-02-22 PROCEDURE — 1126F PR PAIN SEVERITY QUANTIFIED, NO PAIN PRESENT: ICD-10-PCS | Mod: S$GLB,,, | Performed by: DERMATOLOGY

## 2021-02-22 PROCEDURE — 10040 EXTRACTION: CPT | Mod: 59,S$GLB,, | Performed by: DERMATOLOGY

## 2021-02-22 PROCEDURE — 88305 TISSUE EXAM BY PATHOLOGIST: CPT | Performed by: DERMATOLOGY

## 2021-02-22 PROCEDURE — 3288F PR FALLS RISK ASSESSMENT DOCUMENTED: ICD-10-PCS | Mod: CPTII,S$GLB,, | Performed by: DERMATOLOGY

## 2021-02-22 PROCEDURE — 99203 PR OFFICE/OUTPT VISIT, NEW, LEVL III, 30-44 MIN: ICD-10-PCS | Mod: 25,S$GLB,, | Performed by: DERMATOLOGY

## 2021-02-22 PROCEDURE — 3288F FALL RISK ASSESSMENT DOCD: CPT | Mod: CPTII,S$GLB,, | Performed by: DERMATOLOGY

## 2021-02-22 PROCEDURE — 1101F PR PT FALLS ASSESS DOC 0-1 FALLS W/OUT INJ PAST YR: ICD-10-PCS | Mod: CPTII,S$GLB,, | Performed by: DERMATOLOGY

## 2021-02-22 PROCEDURE — 88305 TISSUE EXAM BY PATHOLOGIST: ICD-10-PCS | Mod: 26,,, | Performed by: DERMATOLOGY

## 2021-02-22 PROCEDURE — 88342 CHG IMMUNOCYTOCHEMISTRY: ICD-10-PCS | Mod: 26,,, | Performed by: DERMATOLOGY

## 2021-02-22 PROCEDURE — 99203 OFFICE O/P NEW LOW 30 MIN: CPT | Mod: 25,S$GLB,, | Performed by: DERMATOLOGY

## 2021-02-22 PROCEDURE — 88342 IMHCHEM/IMCYTCHM 1ST ANTB: CPT | Mod: 26,,, | Performed by: DERMATOLOGY

## 2021-02-22 PROCEDURE — 1159F PR MEDICATION LIST DOCUMENTED IN MEDICAL RECORD: ICD-10-PCS | Mod: S$GLB,,, | Performed by: DERMATOLOGY

## 2021-02-22 PROCEDURE — 1159F MED LIST DOCD IN RCRD: CPT | Mod: S$GLB,,, | Performed by: DERMATOLOGY

## 2021-02-22 PROCEDURE — 88342 IMHCHEM/IMCYTCHM 1ST ANTB: CPT | Performed by: DERMATOLOGY

## 2021-02-22 PROCEDURE — 1126F AMNT PAIN NOTED NONE PRSNT: CPT | Mod: S$GLB,,, | Performed by: DERMATOLOGY

## 2021-02-23 ENCOUNTER — HOSPITAL ENCOUNTER (OUTPATIENT)
Dept: RADIOLOGY | Facility: HOSPITAL | Age: 71
Discharge: HOME | End: 2021-02-23
Attending: FAMILY MEDICINE
Payer: MEDICARE

## 2021-02-23 DIAGNOSIS — R42 DIZZINESS AND GIDDINESS: ICD-10-CM

## 2021-02-23 PROCEDURE — 70450 CT HEAD/BRAIN W/O DYE: CPT | Mod: 26,,, | Performed by: RADIOLOGY

## 2021-02-23 PROCEDURE — 70450 CT HEAD WITHOUT CONTRAST: ICD-10-PCS | Mod: 26,,, | Performed by: RADIOLOGY

## 2021-02-23 PROCEDURE — 70450 CT HEAD/BRAIN W/O DYE: CPT | Mod: TC

## 2021-03-02 LAB
FINAL PATHOLOGIC DIAGNOSIS: NORMAL
GROSS: NORMAL
MICROSCOPIC EXAM: NORMAL

## 2021-03-03 ENCOUNTER — PATIENT MESSAGE (OUTPATIENT)
Dept: ADMINISTRATIVE | Facility: OTHER | Age: 71
End: 2021-03-03

## 2021-03-04 ENCOUNTER — PES CALL (OUTPATIENT)
Dept: ADMINISTRATIVE | Facility: CLINIC | Age: 71
End: 2021-03-04

## 2021-03-05 ENCOUNTER — TELEPHONE (OUTPATIENT)
Dept: ADMINISTRATIVE | Facility: CLINIC | Age: 71
End: 2021-03-05

## 2021-03-09 ENCOUNTER — OFFICE VISIT (OUTPATIENT)
Dept: INTERNAL MEDICINE | Facility: CLINIC | Age: 71
End: 2021-03-09
Payer: MEDICARE

## 2021-03-09 VITALS — WEIGHT: 183 LBS | BODY MASS INDEX: 27.11 KG/M2 | HEIGHT: 69 IN

## 2021-03-09 DIAGNOSIS — E51.9 THIAMINE DEFICIENCY: ICD-10-CM

## 2021-03-09 DIAGNOSIS — I10 HYPERTENSION, UNSPECIFIED TYPE: ICD-10-CM

## 2021-03-09 DIAGNOSIS — I70.0 AORTIC CALCIFICATION: ICD-10-CM

## 2021-03-09 DIAGNOSIS — E78.5 HYPERLIPIDEMIA, UNSPECIFIED HYPERLIPIDEMIA TYPE: ICD-10-CM

## 2021-03-09 DIAGNOSIS — Z00.00 ENCOUNTER FOR PREVENTIVE HEALTH EXAMINATION: Primary | ICD-10-CM

## 2021-03-09 PROCEDURE — 1101F PT FALLS ASSESS-DOCD LE1/YR: CPT | Mod: CPTII,S$GLB,, | Performed by: NURSE PRACTITIONER

## 2021-03-09 PROCEDURE — 1101F PR PT FALLS ASSESS DOC 0-1 FALLS W/OUT INJ PAST YR: ICD-10-PCS | Mod: CPTII,S$GLB,, | Performed by: NURSE PRACTITIONER

## 2021-03-09 PROCEDURE — 99499 RISK ADDL DX/OHS AUDIT: ICD-10-PCS | Mod: S$GLB,,, | Performed by: NURSE PRACTITIONER

## 2021-03-09 PROCEDURE — 3008F PR BODY MASS INDEX (BMI) DOCUMENTED: ICD-10-PCS | Mod: CPTII,S$GLB,, | Performed by: NURSE PRACTITIONER

## 2021-03-09 PROCEDURE — G0439 PPPS, SUBSEQ VISIT: HCPCS | Mod: 95,,, | Performed by: NURSE PRACTITIONER

## 2021-03-09 PROCEDURE — 3288F FALL RISK ASSESSMENT DOCD: CPT | Mod: CPTII,S$GLB,, | Performed by: NURSE PRACTITIONER

## 2021-03-09 PROCEDURE — G0439 PR MEDICARE ANNUAL WELLNESS SUBSEQUENT VISIT: ICD-10-PCS | Mod: 95,,, | Performed by: NURSE PRACTITIONER

## 2021-03-09 PROCEDURE — 3008F BODY MASS INDEX DOCD: CPT | Mod: CPTII,S$GLB,, | Performed by: NURSE PRACTITIONER

## 2021-03-09 PROCEDURE — 3288F PR FALLS RISK ASSESSMENT DOCUMENTED: ICD-10-PCS | Mod: CPTII,S$GLB,, | Performed by: NURSE PRACTITIONER

## 2021-03-09 PROCEDURE — 99499 UNLISTED E&M SERVICE: CPT | Mod: S$GLB,,, | Performed by: NURSE PRACTITIONER

## 2021-03-09 PROCEDURE — 1125F PR PAIN SEVERITY QUANTIFIED, PAIN PRESENT: ICD-10-PCS | Mod: S$GLB,,, | Performed by: NURSE PRACTITIONER

## 2021-03-09 PROCEDURE — 1125F AMNT PAIN NOTED PAIN PRSNT: CPT | Mod: S$GLB,,, | Performed by: NURSE PRACTITIONER

## 2021-04-01 ENCOUNTER — PATIENT MESSAGE (OUTPATIENT)
Dept: INTERNAL MEDICINE | Facility: CLINIC | Age: 71
End: 2021-04-01

## 2021-04-11 ENCOUNTER — PATIENT MESSAGE (OUTPATIENT)
Dept: INTERNAL MEDICINE | Facility: CLINIC | Age: 71
End: 2021-04-11

## 2021-04-15 ENCOUNTER — PATIENT MESSAGE (OUTPATIENT)
Dept: DERMATOLOGY | Facility: CLINIC | Age: 71
End: 2021-04-15

## 2021-04-19 ENCOUNTER — OFFICE VISIT (OUTPATIENT)
Dept: DERMATOLOGY | Facility: CLINIC | Age: 71
End: 2021-04-19
Payer: MEDICARE

## 2021-04-19 ENCOUNTER — PATIENT MESSAGE (OUTPATIENT)
Dept: INTERNAL MEDICINE | Facility: CLINIC | Age: 71
End: 2021-04-19

## 2021-04-19 ENCOUNTER — LAB VISIT (OUTPATIENT)
Dept: LAB | Facility: HOSPITAL | Age: 71
End: 2021-04-19
Attending: DERMATOLOGY
Payer: MEDICARE

## 2021-04-19 DIAGNOSIS — K90.9 INTESTINAL MALABSORPTION, UNSPECIFIED TYPE: ICD-10-CM

## 2021-04-19 DIAGNOSIS — L65.0 TELOGEN EFFLUVIUM: ICD-10-CM

## 2021-04-19 DIAGNOSIS — E53.8 DEFICIENCY OF OTHER SPECIFIED B GROUP VITAMINS: ICD-10-CM

## 2021-04-19 DIAGNOSIS — L65.9 HAIR LOSS: ICD-10-CM

## 2021-04-19 DIAGNOSIS — L65.9 HAIR LOSS: Primary | ICD-10-CM

## 2021-04-19 LAB
BASOPHILS # BLD AUTO: 0.05 K/UL (ref 0–0.2)
BASOPHILS NFR BLD: 0.9 % (ref 0–1.9)
DIFFERENTIAL METHOD: NORMAL
EOSINOPHIL # BLD AUTO: 0.1 K/UL (ref 0–0.5)
EOSINOPHIL NFR BLD: 2.1 % (ref 0–8)
ERYTHROCYTE [DISTWIDTH] IN BLOOD BY AUTOMATED COUNT: 13.4 % (ref 11.5–14.5)
HCT VFR BLD AUTO: 39.6 % (ref 37–48.5)
HGB BLD-MCNC: 13.9 G/DL (ref 12–16)
IMM GRANULOCYTES # BLD AUTO: 0.01 K/UL (ref 0–0.04)
IMM GRANULOCYTES NFR BLD AUTO: 0.2 % (ref 0–0.5)
LYMPHOCYTES # BLD AUTO: 1.8 K/UL (ref 1–4.8)
LYMPHOCYTES NFR BLD: 31.2 % (ref 18–48)
MCH RBC QN AUTO: 29.3 PG (ref 27–31)
MCHC RBC AUTO-ENTMCNC: 35.1 G/DL (ref 32–36)
MCV RBC AUTO: 83 FL (ref 82–98)
MONOCYTES # BLD AUTO: 0.5 K/UL (ref 0.3–1)
MONOCYTES NFR BLD: 8.1 % (ref 4–15)
NEUTROPHILS # BLD AUTO: 3.3 K/UL (ref 1.8–7.7)
NEUTROPHILS NFR BLD: 57.5 % (ref 38–73)
NRBC BLD-RTO: 0 /100 WBC
PLATELET # BLD AUTO: 303 K/UL (ref 150–450)
PMV BLD AUTO: 10.1 FL (ref 9.2–12.9)
RBC # BLD AUTO: 4.75 M/UL (ref 4–5.4)
WBC # BLD AUTO: 5.77 K/UL (ref 3.9–12.7)

## 2021-04-19 PROCEDURE — 99499 RISK ADDL DX/OHS AUDIT: ICD-10-PCS | Mod: S$GLB,,, | Performed by: DERMATOLOGY

## 2021-04-19 PROCEDURE — 1159F MED LIST DOCD IN RCRD: CPT | Mod: S$GLB,,, | Performed by: DERMATOLOGY

## 2021-04-19 PROCEDURE — 85025 COMPLETE CBC W/AUTO DIFF WBC: CPT | Performed by: DERMATOLOGY

## 2021-04-19 PROCEDURE — 99999 PR PBB SHADOW E&M-EST. PATIENT-LVL III: CPT | Mod: PBBFAC,,, | Performed by: DERMATOLOGY

## 2021-04-19 PROCEDURE — 1159F PR MEDICATION LIST DOCUMENTED IN MEDICAL RECORD: ICD-10-PCS | Mod: S$GLB,,, | Performed by: DERMATOLOGY

## 2021-04-19 PROCEDURE — 84439 ASSAY OF FREE THYROXINE: CPT | Performed by: DERMATOLOGY

## 2021-04-19 PROCEDURE — 99499 UNLISTED E&M SERVICE: CPT | Mod: S$GLB,,, | Performed by: DERMATOLOGY

## 2021-04-19 PROCEDURE — 84443 ASSAY THYROID STIM HORMONE: CPT | Performed by: DERMATOLOGY

## 2021-04-19 PROCEDURE — 82607 VITAMIN B-12: CPT | Performed by: DERMATOLOGY

## 2021-04-19 PROCEDURE — 82746 ASSAY OF FOLIC ACID SERUM: CPT | Performed by: DERMATOLOGY

## 2021-04-19 PROCEDURE — 84425 ASSAY OF VITAMIN B-1: CPT | Performed by: DERMATOLOGY

## 2021-04-19 PROCEDURE — 99214 OFFICE O/P EST MOD 30 MIN: CPT | Mod: S$GLB,,, | Performed by: DERMATOLOGY

## 2021-04-19 PROCEDURE — 99214 PR OFFICE/OUTPT VISIT, EST, LEVL IV, 30-39 MIN: ICD-10-PCS | Mod: S$GLB,,, | Performed by: DERMATOLOGY

## 2021-04-19 PROCEDURE — 99999 PR PBB SHADOW E&M-EST. PATIENT-LVL III: ICD-10-PCS | Mod: PBBFAC,,, | Performed by: DERMATOLOGY

## 2021-04-19 RX ORDER — FLUOCINOLONE ACETONIDE 0.11 MG/ML
OIL TOPICAL
Qty: 1 BOTTLE | Refills: 5 | Status: SHIPPED | OUTPATIENT
Start: 2021-04-19 | End: 2023-08-30

## 2021-04-20 LAB
FOLATE SERPL-MCNC: 7.9 NG/ML (ref 4–24)
T4 FREE SERPL-MCNC: 1.04 NG/DL (ref 0.71–1.51)
TSH SERPL DL<=0.005 MIU/L-ACNC: 1.6 UIU/ML (ref 0.4–4)
VIT B12 SERPL-MCNC: 405 PG/ML (ref 210–950)

## 2021-04-21 ENCOUNTER — PATIENT OUTREACH (OUTPATIENT)
Dept: ADMINISTRATIVE | Facility: HOSPITAL | Age: 71
End: 2021-04-21

## 2021-04-23 LAB — VIT B1 BLD-MCNC: 71 UG/L (ref 38–122)

## 2021-08-11 ENCOUNTER — LAB VISIT (OUTPATIENT)
Dept: LAB | Facility: HOSPITAL | Age: 71
End: 2021-08-11
Attending: FAMILY MEDICINE
Payer: MEDICARE

## 2021-08-11 DIAGNOSIS — I10 HYPERTENSION, UNSPECIFIED TYPE: ICD-10-CM

## 2021-08-11 DIAGNOSIS — R73.09 ABNORMAL GLUCOSE: ICD-10-CM

## 2021-08-11 DIAGNOSIS — E78.5 HYPERLIPIDEMIA, UNSPECIFIED HYPERLIPIDEMIA TYPE: ICD-10-CM

## 2021-08-11 LAB
ALBUMIN SERPL BCP-MCNC: 3.8 G/DL (ref 3.5–5.2)
ALP SERPL-CCNC: 76 U/L (ref 55–135)
ALT SERPL W/O P-5'-P-CCNC: 17 U/L (ref 10–44)
ANION GAP SERPL CALC-SCNC: 11 MMOL/L (ref 8–16)
AST SERPL-CCNC: 14 U/L (ref 10–40)
BASOPHILS # BLD AUTO: 0.05 K/UL (ref 0–0.2)
BASOPHILS NFR BLD: 0.7 % (ref 0–1.9)
BILIRUB SERPL-MCNC: 0.5 MG/DL (ref 0.1–1)
BUN SERPL-MCNC: 12 MG/DL (ref 8–23)
CALCIUM SERPL-MCNC: 9.3 MG/DL (ref 8.7–10.5)
CHLORIDE SERPL-SCNC: 107 MMOL/L (ref 95–110)
CHOLEST SERPL-MCNC: 169 MG/DL (ref 120–199)
CHOLEST/HDLC SERPL: 3.1 {RATIO} (ref 2–5)
CO2 SERPL-SCNC: 23 MMOL/L (ref 23–29)
CREAT SERPL-MCNC: 1 MG/DL (ref 0.5–1.4)
DIFFERENTIAL METHOD: NORMAL
EOSINOPHIL # BLD AUTO: 0.2 K/UL (ref 0–0.5)
EOSINOPHIL NFR BLD: 2.7 % (ref 0–8)
ERYTHROCYTE [DISTWIDTH] IN BLOOD BY AUTOMATED COUNT: 13.1 % (ref 11.5–14.5)
EST. GFR  (AFRICAN AMERICAN): >60 ML/MIN/1.73 M^2
EST. GFR  (NON AFRICAN AMERICAN): 57.6 ML/MIN/1.73 M^2
ESTIMATED AVG GLUCOSE: 114 MG/DL (ref 68–131)
GLUCOSE SERPL-MCNC: 93 MG/DL (ref 70–110)
HBA1C MFR BLD: 5.6 % (ref 4–5.6)
HCT VFR BLD AUTO: 39.9 % (ref 37–48.5)
HDLC SERPL-MCNC: 54 MG/DL (ref 40–75)
HDLC SERPL: 32 % (ref 20–50)
HGB BLD-MCNC: 13.2 G/DL (ref 12–16)
IMM GRANULOCYTES # BLD AUTO: 0.01 K/UL (ref 0–0.04)
IMM GRANULOCYTES NFR BLD AUTO: 0.1 % (ref 0–0.5)
LDLC SERPL CALC-MCNC: 81.2 MG/DL (ref 63–159)
LYMPHOCYTES # BLD AUTO: 2.6 K/UL (ref 1–4.8)
LYMPHOCYTES NFR BLD: 38.6 % (ref 18–48)
MCH RBC QN AUTO: 27.8 PG (ref 27–31)
MCHC RBC AUTO-ENTMCNC: 33.1 G/DL (ref 32–36)
MCV RBC AUTO: 84 FL (ref 82–98)
MONOCYTES # BLD AUTO: 0.6 K/UL (ref 0.3–1)
MONOCYTES NFR BLD: 9.4 % (ref 4–15)
NEUTROPHILS # BLD AUTO: 3.2 K/UL (ref 1.8–7.7)
NEUTROPHILS NFR BLD: 48.5 % (ref 38–73)
NONHDLC SERPL-MCNC: 115 MG/DL
NRBC BLD-RTO: 0 /100 WBC
PLATELET # BLD AUTO: 276 K/UL (ref 150–450)
PMV BLD AUTO: 9.8 FL (ref 9.2–12.9)
POTASSIUM SERPL-SCNC: 4.1 MMOL/L (ref 3.5–5.1)
PROT SERPL-MCNC: 6.9 G/DL (ref 6–8.4)
RBC # BLD AUTO: 4.75 M/UL (ref 4–5.4)
SODIUM SERPL-SCNC: 141 MMOL/L (ref 136–145)
TRIGL SERPL-MCNC: 169 MG/DL (ref 30–150)
TSH SERPL DL<=0.005 MIU/L-ACNC: 2.29 UIU/ML (ref 0.4–4)
WBC # BLD AUTO: 6.69 K/UL (ref 3.9–12.7)

## 2021-08-11 PROCEDURE — 85025 COMPLETE CBC W/AUTO DIFF WBC: CPT | Performed by: FAMILY MEDICINE

## 2021-08-11 PROCEDURE — 80061 LIPID PANEL: CPT | Performed by: FAMILY MEDICINE

## 2021-08-11 PROCEDURE — 83036 HEMOGLOBIN GLYCOSYLATED A1C: CPT | Performed by: FAMILY MEDICINE

## 2021-08-11 PROCEDURE — 36415 COLL VENOUS BLD VENIPUNCTURE: CPT | Mod: PO | Performed by: FAMILY MEDICINE

## 2021-08-11 PROCEDURE — 80053 COMPREHEN METABOLIC PANEL: CPT | Performed by: FAMILY MEDICINE

## 2021-08-11 PROCEDURE — 84443 ASSAY THYROID STIM HORMONE: CPT | Performed by: FAMILY MEDICINE

## 2021-08-23 ENCOUNTER — OFFICE VISIT (OUTPATIENT)
Dept: INTERNAL MEDICINE | Facility: CLINIC | Age: 71
End: 2021-08-23
Payer: MEDICARE

## 2021-08-23 DIAGNOSIS — N39.0 URINARY TRACT INFECTION WITHOUT HEMATURIA, SITE UNSPECIFIED: ICD-10-CM

## 2021-08-23 DIAGNOSIS — Z00.00 ANNUAL PHYSICAL EXAM: Primary | ICD-10-CM

## 2021-08-23 PROCEDURE — 3044F PR MOST RECENT HEMOGLOBIN A1C LEVEL <7.0%: ICD-10-PCS | Mod: CPTII,95,, | Performed by: FAMILY MEDICINE

## 2021-08-23 PROCEDURE — 1160F PR REVIEW ALL MEDS BY PRESCRIBER/CLIN PHARMACIST DOCUMENTED: ICD-10-PCS | Mod: CPTII,95,, | Performed by: FAMILY MEDICINE

## 2021-08-23 PROCEDURE — 1160F RVW MEDS BY RX/DR IN RCRD: CPT | Mod: CPTII,95,, | Performed by: FAMILY MEDICINE

## 2021-08-23 PROCEDURE — 1159F PR MEDICATION LIST DOCUMENTED IN MEDICAL RECORD: ICD-10-PCS | Mod: CPTII,95,, | Performed by: FAMILY MEDICINE

## 2021-08-23 PROCEDURE — 3044F HG A1C LEVEL LT 7.0%: CPT | Mod: CPTII,95,, | Performed by: FAMILY MEDICINE

## 2021-08-23 PROCEDURE — 1159F MED LIST DOCD IN RCRD: CPT | Mod: CPTII,95,, | Performed by: FAMILY MEDICINE

## 2021-08-23 PROCEDURE — 99397 PR PREVENTIVE VISIT,EST,65 & OVER: ICD-10-PCS | Mod: 95,,, | Performed by: FAMILY MEDICINE

## 2021-08-23 PROCEDURE — 99397 PER PM REEVAL EST PAT 65+ YR: CPT | Mod: 95,,, | Performed by: FAMILY MEDICINE

## 2021-08-23 RX ORDER — SULFAMETHOXAZOLE AND TRIMETHOPRIM 800; 160 MG/1; MG/1
1 TABLET ORAL 2 TIMES DAILY
Qty: 14 TABLET | Refills: 0 | Status: SHIPPED | OUTPATIENT
Start: 2021-08-23 | End: 2021-08-30

## 2021-09-20 ENCOUNTER — PATIENT MESSAGE (OUTPATIENT)
Dept: INTERNAL MEDICINE | Facility: CLINIC | Age: 71
End: 2021-09-20

## 2022-01-26 RX ORDER — PANTOPRAZOLE SODIUM 40 MG/1
TABLET, DELAYED RELEASE ORAL
Qty: 90 TABLET | Refills: 3 | Status: SHIPPED | OUTPATIENT
Start: 2022-01-26 | End: 2022-12-19

## 2022-01-26 NOTE — TELEPHONE ENCOUNTER
No new care gaps identified.  Powered by [employer]. Reference number: [id number].   1/26/2022 9:46:32 AM CST

## 2022-02-18 ENCOUNTER — PATIENT MESSAGE (OUTPATIENT)
Dept: ADMINISTRATIVE | Facility: OTHER | Age: 72
End: 2022-02-18
Payer: MEDICARE

## 2022-02-23 ENCOUNTER — OFFICE VISIT (OUTPATIENT)
Dept: INTERNAL MEDICINE | Facility: CLINIC | Age: 72
End: 2022-02-23
Payer: MEDICARE

## 2022-02-23 ENCOUNTER — HOSPITAL ENCOUNTER (OUTPATIENT)
Dept: RADIOLOGY | Facility: HOSPITAL | Age: 72
Discharge: HOME | End: 2022-02-23
Attending: FAMILY MEDICINE
Payer: MEDICARE

## 2022-02-23 VITALS
OXYGEN SATURATION: 99 % | DIASTOLIC BLOOD PRESSURE: 70 MMHG | HEART RATE: 89 BPM | TEMPERATURE: 97 F | WEIGHT: 201.25 LBS | HEIGHT: 69 IN | SYSTOLIC BLOOD PRESSURE: 110 MMHG | BODY MASS INDEX: 29.81 KG/M2

## 2022-02-23 DIAGNOSIS — I10 HYPERTENSION, UNSPECIFIED TYPE: Primary | ICD-10-CM

## 2022-02-23 DIAGNOSIS — E78.5 HYPERLIPIDEMIA, UNSPECIFIED HYPERLIPIDEMIA TYPE: ICD-10-CM

## 2022-02-23 DIAGNOSIS — E04.2 MULTIPLE THYROID NODULES: ICD-10-CM

## 2022-02-23 DIAGNOSIS — R73.09 ABNORMAL GLUCOSE: ICD-10-CM

## 2022-02-23 DIAGNOSIS — I70.0 AORTIC CALCIFICATION: ICD-10-CM

## 2022-02-23 PROCEDURE — 3078F DIAST BP <80 MM HG: CPT | Mod: HCNC,CPTII,S$GLB, | Performed by: FAMILY MEDICINE

## 2022-02-23 PROCEDURE — 1101F PR PT FALLS ASSESS DOC 0-1 FALLS W/OUT INJ PAST YR: ICD-10-PCS | Mod: HCNC,CPTII,S$GLB, | Performed by: FAMILY MEDICINE

## 2022-02-23 PROCEDURE — 3288F FALL RISK ASSESSMENT DOCD: CPT | Mod: HCNC,CPTII,S$GLB, | Performed by: FAMILY MEDICINE

## 2022-02-23 PROCEDURE — 3288F PR FALLS RISK ASSESSMENT DOCUMENTED: ICD-10-PCS | Mod: HCNC,CPTII,S$GLB, | Performed by: FAMILY MEDICINE

## 2022-02-23 PROCEDURE — 99214 OFFICE O/P EST MOD 30 MIN: CPT | Mod: HCNC,S$GLB,, | Performed by: FAMILY MEDICINE

## 2022-02-23 PROCEDURE — 3078F PR MOST RECENT DIASTOLIC BLOOD PRESSURE < 80 MM HG: ICD-10-PCS | Mod: HCNC,CPTII,S$GLB, | Performed by: FAMILY MEDICINE

## 2022-02-23 PROCEDURE — 1159F PR MEDICATION LIST DOCUMENTED IN MEDICAL RECORD: ICD-10-PCS | Mod: HCNC,CPTII,S$GLB, | Performed by: FAMILY MEDICINE

## 2022-02-23 PROCEDURE — 76536 US SOFT TISSUE HEAD NECK THYROID: ICD-10-PCS | Mod: 26,HCNC,, | Performed by: RADIOLOGY

## 2022-02-23 PROCEDURE — 1126F PR PAIN SEVERITY QUANTIFIED, NO PAIN PRESENT: ICD-10-PCS | Mod: HCNC,CPTII,S$GLB, | Performed by: FAMILY MEDICINE

## 2022-02-23 PROCEDURE — 1101F PT FALLS ASSESS-DOCD LE1/YR: CPT | Mod: HCNC,CPTII,S$GLB, | Performed by: FAMILY MEDICINE

## 2022-02-23 PROCEDURE — 1159F MED LIST DOCD IN RCRD: CPT | Mod: HCNC,CPTII,S$GLB, | Performed by: FAMILY MEDICINE

## 2022-02-23 PROCEDURE — 1126F AMNT PAIN NOTED NONE PRSNT: CPT | Mod: HCNC,CPTII,S$GLB, | Performed by: FAMILY MEDICINE

## 2022-02-23 PROCEDURE — 3074F SYST BP LT 130 MM HG: CPT | Mod: HCNC,CPTII,S$GLB, | Performed by: FAMILY MEDICINE

## 2022-02-23 PROCEDURE — 1160F RVW MEDS BY RX/DR IN RCRD: CPT | Mod: HCNC,CPTII,S$GLB, | Performed by: FAMILY MEDICINE

## 2022-02-23 PROCEDURE — 99214 PR OFFICE/OUTPT VISIT, EST, LEVL IV, 30-39 MIN: ICD-10-PCS | Mod: HCNC,S$GLB,, | Performed by: FAMILY MEDICINE

## 2022-02-23 PROCEDURE — 3008F PR BODY MASS INDEX (BMI) DOCUMENTED: ICD-10-PCS | Mod: HCNC,CPTII,S$GLB, | Performed by: FAMILY MEDICINE

## 2022-02-23 PROCEDURE — 76536 US EXAM OF HEAD AND NECK: CPT | Mod: 26,HCNC,, | Performed by: RADIOLOGY

## 2022-02-23 PROCEDURE — 76536 US EXAM OF HEAD AND NECK: CPT | Mod: TC,HCNC

## 2022-02-23 PROCEDURE — 3008F BODY MASS INDEX DOCD: CPT | Mod: HCNC,CPTII,S$GLB, | Performed by: FAMILY MEDICINE

## 2022-02-23 PROCEDURE — 99999 PR PBB SHADOW E&M-EST. PATIENT-LVL IV: CPT | Mod: PBBFAC,HCNC,, | Performed by: FAMILY MEDICINE

## 2022-02-23 PROCEDURE — 99999 PR PBB SHADOW E&M-EST. PATIENT-LVL IV: ICD-10-PCS | Mod: PBBFAC,HCNC,, | Performed by: FAMILY MEDICINE

## 2022-02-23 PROCEDURE — 1160F PR REVIEW ALL MEDS BY PRESCRIBER/CLIN PHARMACIST DOCUMENTED: ICD-10-PCS | Mod: HCNC,CPTII,S$GLB, | Performed by: FAMILY MEDICINE

## 2022-02-23 PROCEDURE — 3074F PR MOST RECENT SYSTOLIC BLOOD PRESSURE < 130 MM HG: ICD-10-PCS | Mod: HCNC,CPTII,S$GLB, | Performed by: FAMILY MEDICINE

## 2022-02-23 RX ORDER — CHOLECALCIFEROL (VITAMIN D3) 125 MCG
5000 CAPSULE ORAL
COMMUNITY

## 2022-02-23 RX ORDER — IBUPROFEN 100 MG/5ML
1000 SUSPENSION, ORAL (FINAL DOSE FORM) ORAL
COMMUNITY

## 2022-02-23 NOTE — PROGRESS NOTES
"Subjective:      Patient ID: Kailey Stokes is a 71 y.o. female.    Chief Complaint:  F/u chronic conditions    HPI 71 y.o.   female patient with a PMHx of anxiety, breast disorder, HLD, HTN and nerve damage presents to clinic for follow up. The patient was last seen on August 23, 2021       Today she reports that she is feeling well. Worries about her kidney health and sugar due to fam hx.  Has lost several siblings.  4 out of 11 are left now living.    Patient states that she has been engaging in frequent exercise and attempting to make dietary changes.     Patient has no major complaints.     Past Medical History:   Diagnosis Date    Abnormal Pap smear     Anxiety     Dr. Bose    Breast disorder     Pain in left breast    Family history of colonic polyps 10/18/2018    Her brother and sister have both had colon polyps.    Hyperlipidemia     Hypertension     Nerve damage     legs     Family History   Problem Relation Age of Onset    Diabetes Mother     Glaucoma Mother     Hypertension Father     Heart disease Father     Diabetes Sister     Glaucoma Sister     Lymphoma Sister     Diabetes Brother     Breast cancer Maternal Aunt      Past Surgical History:   Procedure Laterality Date    COLONOSCOPY N/A 10/18/2018    Procedure: COLONOSCOPY;  Surgeon: Jayjay Grier MD;  Location: [facility];  Service: Endoscopy;  Laterality: N/A;    HYSTERECTOMY      KNEE SURGERY      TOTAL ABDOMINAL HYSTERECTOMY W/ BILATERAL SALPINGOOPHORECTOMY       Social History     Tobacco Use    Smoking status: Never Smoker    Smokeless tobacco: Never Used   Substance Use Topics    Alcohol use: Yes     Comment: occ use    Drug use: No       /70   Pulse 89   Temp 97.3 °F (36.3 °C)   Ht 5' 9" (1.753 m)   Wt 91.3 kg (201 lb 4.5 oz)   SpO2 99%   BMI 29.72 kg/m²     Review of Systems   Constitutional: Negative for activity change, appetite change, chills, diaphoresis, fatigue, fever and unexpected weight " change.   HENT: Negative for hearing loss, rhinorrhea, tinnitus and trouble swallowing.    Eyes: Negative for discharge and visual disturbance.   Respiratory: Negative for cough, chest tightness, shortness of breath and wheezing.    Cardiovascular: Negative for chest pain, palpitations and leg swelling.   Gastrointestinal: Negative for abdominal distention, abdominal pain, blood in stool, constipation, diarrhea and vomiting.   Endocrine: Negative for polydipsia and polyuria.   Genitourinary: Negative for difficulty urinating, dysuria, frequency, hematuria, menstrual problem and urgency.   Musculoskeletal: Negative for arthralgias, back pain, joint swelling and neck pain.   Skin: Negative for color change and rash.   Neurological: Negative for weakness and headaches.   Hematological: Negative for adenopathy.   Psychiatric/Behavioral: Negative for confusion, decreased concentration and dysphoric mood.       Objective:     Physical Exam  Vitals and nursing note reviewed.   Constitutional:       General: She is not in acute distress.  HENT:      Right Ear: External ear normal.      Left Ear: External ear normal.      Nose: Nose normal.   Eyes:      Conjunctiva/sclera: Conjunctivae normal.      Pupils: Pupils are equal, round, and reactive to light.   Neck:      Vascular: No carotid bruit.   Cardiovascular:      Rate and Rhythm: Normal rate and regular rhythm.      Heart sounds: Normal heart sounds.   Pulmonary:      Effort: Pulmonary effort is normal. No respiratory distress.      Breath sounds: Normal breath sounds. No wheezing or rales.   Abdominal:      General: Bowel sounds are normal. There is no distension.      Palpations: Abdomen is soft.      Tenderness: There is no abdominal tenderness. There is no guarding.   Musculoskeletal:      Cervical back: Normal range of motion and neck supple.      Right lower leg: No edema.      Left lower leg: No edema.   Skin:     General: Skin is warm and dry.      Findings: No  rash.   Neurological:      Mental Status: She is alert and oriented to person, place, and time.   Psychiatric:         Behavior: Behavior normal.         Thought Content: Thought content normal.         Judgment: Judgment normal.         Lab Results   Component Value Date    WBC 6.69 08/11/2021    HGB 13.2 08/11/2021    HCT 39.9 08/11/2021     08/11/2021    CHOL 169 08/11/2021    TRIG 169 (H) 08/11/2021    HDL 54 08/11/2021    ALT 17 08/11/2021    AST 14 08/11/2021     08/11/2021    K 4.1 08/11/2021     08/11/2021    CREATININE 1.0 08/11/2021    BUN 12 08/11/2021    CO2 23 08/11/2021    TSH 2.287 08/11/2021    INR 1.0 09/23/2019    HGBA1C 5.6 08/11/2021       Assessment:     1. Hypertension, unspecified type    2. Hyperlipidemia, unspecified hyperlipidemia type    3. Abnormal glucose    4. Aortic calcification    5. Multiple thyroid nodules         Plan:     Hypertension, unspecified type  -     Basic Metabolic Panel; Future; Expected date: 02/23/2022    Hyperlipidemia, unspecified hyperlipidemia type    Abnormal glucose  -     Hemoglobin A1C; Future; Expected date: 02/23/2022  -     Microalbumin/Creatinine Ratio, Urine; Future; Expected date: 08/23/2022    Aortic calcification  Comments:  Daily statin//ASA every few days    Multiple thyroid nodules  -     US Soft Tissue Head Neck Thyroid; Future; Expected date: 02/23/2022      Update labs/urine  BP stable/cont meds  Labs have been stable last check  Update thyroid US  Cont statin/ASA//has cards visit this year around May  Fu 6 mos for annual    Vitals reviewed and stable. BP within normal range at 110/70.     Patient chronic conditions overall stable with no medications changes. She will continue with current regimen.     Questions and concerns addressed.      Documentation entered by Rikki Li, acting as scribe for Dr. Karel Durán. 02/23/2022 9:57 AM.

## 2022-02-25 ENCOUNTER — PATIENT MESSAGE (OUTPATIENT)
Dept: INTERNAL MEDICINE | Facility: CLINIC | Age: 72
End: 2022-02-25
Payer: MEDICARE

## 2022-03-02 ENCOUNTER — TELEPHONE (OUTPATIENT)
Dept: OPHTHALMOLOGY | Facility: CLINIC | Age: 72
End: 2022-03-02
Payer: MEDICARE

## 2022-03-02 NOTE — TELEPHONE ENCOUNTER
----- Message from Carly Giron sent at 3/2/2022 12:28 PM CST -----  Contact: [phone]  Patient is calling regarding appt. States she would like to come at a later time that day, maybe late morning.. please call back at [phone]. Thanks/dj

## 2022-03-04 ENCOUNTER — OFFICE VISIT (OUTPATIENT)
Dept: OPHTHALMOLOGY | Facility: CLINIC | Age: 72
End: 2022-03-04
Payer: MEDICARE

## 2022-03-04 DIAGNOSIS — H52.4 HYPEROPIA WITH PRESBYOPIA, BILATERAL: ICD-10-CM

## 2022-03-04 DIAGNOSIS — H52.03 HYPEROPIA WITH PRESBYOPIA, BILATERAL: ICD-10-CM

## 2022-03-04 DIAGNOSIS — H25.13 NUCLEAR SCLEROSIS, BILATERAL: Primary | ICD-10-CM

## 2022-03-04 PROCEDURE — 92014 COMPRE OPH EXAM EST PT 1/>: CPT | Mod: S$GLB,,, | Performed by: OPTOMETRIST

## 2022-03-04 PROCEDURE — 1159F PR MEDICATION LIST DOCUMENTED IN MEDICAL RECORD: ICD-10-PCS | Mod: CPTII,S$GLB,, | Performed by: OPTOMETRIST

## 2022-03-04 PROCEDURE — 1159F MED LIST DOCD IN RCRD: CPT | Mod: CPTII,S$GLB,, | Performed by: OPTOMETRIST

## 2022-03-04 PROCEDURE — 3061F PR NEG MICROALBUMINURIA RESULT DOCUMENTED/REVIEW: ICD-10-PCS | Mod: CPTII,S$GLB,, | Performed by: OPTOMETRIST

## 2022-03-04 PROCEDURE — 3044F HG A1C LEVEL LT 7.0%: CPT | Mod: CPTII,S$GLB,, | Performed by: OPTOMETRIST

## 2022-03-04 PROCEDURE — 92014 PR EYE EXAM, EST PATIENT,COMPREHESV: ICD-10-PCS | Mod: S$GLB,,, | Performed by: OPTOMETRIST

## 2022-03-04 PROCEDURE — 3044F PR MOST RECENT HEMOGLOBIN A1C LEVEL <7.0%: ICD-10-PCS | Mod: CPTII,S$GLB,, | Performed by: OPTOMETRIST

## 2022-03-04 PROCEDURE — 3066F NEPHROPATHY DOC TX: CPT | Mod: CPTII,S$GLB,, | Performed by: OPTOMETRIST

## 2022-03-04 PROCEDURE — 99999 PR PBB SHADOW E&M-EST. PATIENT-LVL I: ICD-10-PCS | Mod: PBBFAC,,, | Performed by: OPTOMETRIST

## 2022-03-04 PROCEDURE — 1160F PR REVIEW ALL MEDS BY PRESCRIBER/CLIN PHARMACIST DOCUMENTED: ICD-10-PCS | Mod: CPTII,S$GLB,, | Performed by: OPTOMETRIST

## 2022-03-04 PROCEDURE — 92015 DETERMINE REFRACTIVE STATE: CPT | Mod: S$GLB,,, | Performed by: OPTOMETRIST

## 2022-03-04 PROCEDURE — 99999 PR PBB SHADOW E&M-EST. PATIENT-LVL I: CPT | Mod: PBBFAC,,, | Performed by: OPTOMETRIST

## 2022-03-04 PROCEDURE — 92015 PR REFRACTION: ICD-10-PCS | Mod: S$GLB,,, | Performed by: OPTOMETRIST

## 2022-03-04 PROCEDURE — 3066F PR DOCUMENTATION OF TREATMENT FOR NEPHROPATHY: ICD-10-PCS | Mod: CPTII,S$GLB,, | Performed by: OPTOMETRIST

## 2022-03-04 PROCEDURE — 3061F NEG MICROALBUMINURIA REV: CPT | Mod: CPTII,S$GLB,, | Performed by: OPTOMETRIST

## 2022-03-04 PROCEDURE — 1160F RVW MEDS BY RX/DR IN RCRD: CPT | Mod: CPTII,S$GLB,, | Performed by: OPTOMETRIST

## 2022-03-04 NOTE — PROGRESS NOTES
HPI     Annual Exam     Comments: Vision changes since last eye exam?: yes, foggy in the   peripheral    Any eye pain today: no  Other ocular symptoms: sees glares, itchy and watery eyes. Pt uses [employer]   when symptoms occur  Interested in contact lens fitting today? Yes           Last edited by Barbie Veliz MA on 3/4/2022  8:52 AM. (History)            Assessment /Plan     For exam results, see Encounter Report.    Nuclear sclerosis, bilateral  Cataracts not significantly affecting activities of daily living and therefore surgery is not indicated at this time.   Will continue to monitor over the next 12 months. Pt to call or RTC with any significant change in vision prior to next visit.     Hyperopia with presbyopia, bilateral  Eyeglass Final Rx     Eyeglass Final Rx       Sphere Add    Right +0.75 +2.50    Left +0.75 +2.50    Expiration Date: 3/4/2023                RTC 1 yr for dilated eye exam or PRN if any problems.   Discussed above and answered questions.                    Airway patent, Nasal mucosa clear. Mouth with normal mucosa. Throat has no vesicles, no oropharyngeal exudates and uvula is midline.

## 2022-03-18 ENCOUNTER — PATIENT MESSAGE (OUTPATIENT)
Dept: ADMINISTRATIVE | Facility: OTHER | Age: 72
End: 2022-03-18
Payer: MEDICARE

## 2022-03-22 ENCOUNTER — OFFICE VISIT (OUTPATIENT)
Dept: OTOLARYNGOLOGY | Facility: CLINIC | Age: 72
End: 2022-03-22
Payer: MEDICARE

## 2022-03-22 VITALS — BODY MASS INDEX: 29.2 KG/M2 | WEIGHT: 197.75 LBS

## 2022-03-22 DIAGNOSIS — J30.9 ALLERGIC RHINITIS, UNSPECIFIED SEASONALITY, UNSPECIFIED TRIGGER: Primary | ICD-10-CM

## 2022-03-22 PROCEDURE — 99203 OFFICE O/P NEW LOW 30 MIN: CPT | Mod: S$GLB,,, | Performed by: STUDENT IN AN ORGANIZED HEALTH CARE EDUCATION/TRAINING PROGRAM

## 2022-03-22 PROCEDURE — 3066F PR DOCUMENTATION OF TREATMENT FOR NEPHROPATHY: ICD-10-PCS | Mod: CPTII,S$GLB,, | Performed by: STUDENT IN AN ORGANIZED HEALTH CARE EDUCATION/TRAINING PROGRAM

## 2022-03-22 PROCEDURE — 1159F MED LIST DOCD IN RCRD: CPT | Mod: CPTII,S$GLB,, | Performed by: STUDENT IN AN ORGANIZED HEALTH CARE EDUCATION/TRAINING PROGRAM

## 2022-03-22 PROCEDURE — 1126F AMNT PAIN NOTED NONE PRSNT: CPT | Mod: CPTII,S$GLB,, | Performed by: STUDENT IN AN ORGANIZED HEALTH CARE EDUCATION/TRAINING PROGRAM

## 2022-03-22 PROCEDURE — 1126F PR PAIN SEVERITY QUANTIFIED, NO PAIN PRESENT: ICD-10-PCS | Mod: CPTII,S$GLB,, | Performed by: STUDENT IN AN ORGANIZED HEALTH CARE EDUCATION/TRAINING PROGRAM

## 2022-03-22 PROCEDURE — 3061F PR NEG MICROALBUMINURIA RESULT DOCUMENTED/REVIEW: ICD-10-PCS | Mod: CPTII,S$GLB,, | Performed by: STUDENT IN AN ORGANIZED HEALTH CARE EDUCATION/TRAINING PROGRAM

## 2022-03-22 PROCEDURE — 3044F PR MOST RECENT HEMOGLOBIN A1C LEVEL <7.0%: ICD-10-PCS | Mod: CPTII,S$GLB,, | Performed by: STUDENT IN AN ORGANIZED HEALTH CARE EDUCATION/TRAINING PROGRAM

## 2022-03-22 PROCEDURE — 3288F FALL RISK ASSESSMENT DOCD: CPT | Mod: CPTII,S$GLB,, | Performed by: STUDENT IN AN ORGANIZED HEALTH CARE EDUCATION/TRAINING PROGRAM

## 2022-03-22 PROCEDURE — 99999 PR PBB SHADOW E&M-EST. PATIENT-LVL III: CPT | Mod: PBBFAC,,, | Performed by: STUDENT IN AN ORGANIZED HEALTH CARE EDUCATION/TRAINING PROGRAM

## 2022-03-22 PROCEDURE — 3008F BODY MASS INDEX DOCD: CPT | Mod: CPTII,S$GLB,, | Performed by: STUDENT IN AN ORGANIZED HEALTH CARE EDUCATION/TRAINING PROGRAM

## 2022-03-22 PROCEDURE — 1159F PR MEDICATION LIST DOCUMENTED IN MEDICAL RECORD: ICD-10-PCS | Mod: CPTII,S$GLB,, | Performed by: STUDENT IN AN ORGANIZED HEALTH CARE EDUCATION/TRAINING PROGRAM

## 2022-03-22 PROCEDURE — 99999 PR PBB SHADOW E&M-EST. PATIENT-LVL III: ICD-10-PCS | Mod: PBBFAC,,, | Performed by: STUDENT IN AN ORGANIZED HEALTH CARE EDUCATION/TRAINING PROGRAM

## 2022-03-22 PROCEDURE — 3061F NEG MICROALBUMINURIA REV: CPT | Mod: CPTII,S$GLB,, | Performed by: STUDENT IN AN ORGANIZED HEALTH CARE EDUCATION/TRAINING PROGRAM

## 2022-03-22 PROCEDURE — 3008F PR BODY MASS INDEX (BMI) DOCUMENTED: ICD-10-PCS | Mod: CPTII,S$GLB,, | Performed by: STUDENT IN AN ORGANIZED HEALTH CARE EDUCATION/TRAINING PROGRAM

## 2022-03-22 PROCEDURE — 1101F PR PT FALLS ASSESS DOC 0-1 FALLS W/OUT INJ PAST YR: ICD-10-PCS | Mod: CPTII,S$GLB,, | Performed by: STUDENT IN AN ORGANIZED HEALTH CARE EDUCATION/TRAINING PROGRAM

## 2022-03-22 PROCEDURE — 3066F NEPHROPATHY DOC TX: CPT | Mod: CPTII,S$GLB,, | Performed by: STUDENT IN AN ORGANIZED HEALTH CARE EDUCATION/TRAINING PROGRAM

## 2022-03-22 PROCEDURE — 3288F PR FALLS RISK ASSESSMENT DOCUMENTED: ICD-10-PCS | Mod: CPTII,S$GLB,, | Performed by: STUDENT IN AN ORGANIZED HEALTH CARE EDUCATION/TRAINING PROGRAM

## 2022-03-22 PROCEDURE — 99203 PR OFFICE/OUTPT VISIT, NEW, LEVL III, 30-44 MIN: ICD-10-PCS | Mod: S$GLB,,, | Performed by: STUDENT IN AN ORGANIZED HEALTH CARE EDUCATION/TRAINING PROGRAM

## 2022-03-22 PROCEDURE — 1101F PT FALLS ASSESS-DOCD LE1/YR: CPT | Mod: CPTII,S$GLB,, | Performed by: STUDENT IN AN ORGANIZED HEALTH CARE EDUCATION/TRAINING PROGRAM

## 2022-03-22 PROCEDURE — 3044F HG A1C LEVEL LT 7.0%: CPT | Mod: CPTII,S$GLB,, | Performed by: STUDENT IN AN ORGANIZED HEALTH CARE EDUCATION/TRAINING PROGRAM

## 2022-03-22 NOTE — PROGRESS NOTES
Chief complaint:    Chief Complaint   Patient presents with    Other     Pressure in back of head x several months.  Does have bulging disk in her neck/back.         History of present illness:     Ms. Stokes is a 71 y.o. presenting for evaluation of pressure in her head.    Pressure/pain in posterior neck/occiput when she lies down.     Denies anterior neck pain, neck mass, odynophagia, dysphagia, ear pain, weight loss.     Denies facial pressure/pain, purulent rhinorrhea, nasal congestion, anosmia.     Has occasional allergies with rhinorrhea, intermittent mild hoarseness in the AM. Used Flonase, but not recently because of an ulcer that formed. Using pataday for watery eyes.    History      Past Medical History:   Past Medical History:   Diagnosis Date    Abnormal Pap smear     Anxiety     Dr. Bose    Breast disorder     Pain in left breast    Family history of colonic polyps 10/18/2018    Her brother and sister have both had colon polyps.    Hyperlipidemia     Hypertension     Nerve damage     legs         Past Surgical History:  Past Surgical History:   Procedure Laterality Date    COLONOSCOPY N/A 10/18/2018    Procedure: COLONOSCOPY;  Surgeon: Jayjay Grier MD;  Location: [facility];  Service: Endoscopy;  Laterality: N/A;    HYSTERECTOMY      KNEE SURGERY      TOTAL ABDOMINAL HYSTERECTOMY W/ BILATERAL SALPINGOOPHORECTOMY           Medications: Medication list reviewed. She  has a current medication list which includes the following prescription(s): amlodipine, ascorbic acid (vitamin c), aspirin, cholecalciferol (vitamin d3), clonazepam, cranberry fruit extract, ergocalciferol (vitamin d2), estradiol, fluocinolone, fluticasone propionate, icosapent ethyl, pantoprazole, rosuvastatin, and vitamin b-1.     Allergies:   Review of patient's allergies indicates:   Allergen Reactions    Bromocriptine      Other reaction(s): nightmares  Other reaction(s): anixety    Codeine      Other reaction(s):  Headache    Morphine Rash         Family history: family history includes Breast cancer in her maternal aunt; Diabetes in her brother, mother, and sister; Glaucoma in her mother and sister; Heart disease in her father; Hypertension in her father; Lymphoma in her sister.         Social History          Alcohol use:  reports current alcohol use.            Tobacco:  reports that she has never smoked. She has never used smokeless tobacco.         Physical Examination      Vitals: Weight 89.7 kg (197 lb 12 oz).      General: Well developed, well nourished, well hydrated.     Voice: no dysphonia, no dysarthria      Head/Face: Normocephalic, atraumatic. No scars or lesions. Facial musculature equal.     Eyes: No scleral icterus or conjunctival hemorrhage. EOMI. PERRLA.     Ears:     · Right ear: No gross deformity. EAC is clear of debris and erythema. TM are intact with a pneumatized middle ear. No signs of retraction, fluid or infection.      · Left ear: No gross deformity. EAC is clear of debris and erythema. TM are intact with a pneumatized middle ear. No signs of retraction, fluid or infection.      Nose: No gross deformity or lesions. No purulent discharge. No significant NSD.     Mouth/Oropharynx: Lips without any lesions. No mucosal lesions within the oropharynx. No tonsillar exudate or lesions. Pharyngeal walls symmetrical. Uvula midline. Tongue midline without lesions.     Neurologic: Moving all extremities without gross abnormality.CN II-XII grossly intact. House-Brackmann 1/6. No signs of nystagmus.          Data reviewed      Imaging:      I have independently reviewed the following imaging with the findings noted below:     US neck:  3 small cystic nodules,  None meeting FNA criteria    CT head 2/18/21  Sinuses clear    CT neck 9/16/19  Sinuses clear    Assessment/Plan:    Allergic rhinitis, unspecified seasonality, unspecified trigger     I would recommend the patient start on daily, consistent  "medication for allergies.  I would recommend daily use of steroid nasal spray as well as an oral antihistamine.  There are a variety of oral antihistamines available over the counter, and one is not inherently the "best," though often patients will find that one works best for them.  We also discussed the proper mechanism of using a steroid nasal spray, to direct the spray away from the patient's nasal septum. It can take a couple weeks for the effects of these medications to become apparent, so we discussed the importance of compliance.  I would recommend continuing with these medications for about 2 months, and then will have the patient contact me with progress.  If allergy symptoms persist at that time, we would then consider pursuing allergy skin testing.      Keep scheduled f/u with neurosurgery for posterior neck pain/ bulging disc     Aj Angel MD  [facility]   [facility]  [id number] [place] [address].  [place] [id number]  P: [phone]  F: [phone]       "

## 2022-04-01 ENCOUNTER — PATIENT MESSAGE (OUTPATIENT)
Dept: ADMINISTRATIVE | Facility: OTHER | Age: 72
End: 2022-04-01
Payer: MEDICARE

## 2022-04-19 ENCOUNTER — PATIENT MESSAGE (OUTPATIENT)
Dept: ADMINISTRATIVE | Facility: OTHER | Age: 72
End: 2022-04-19
Payer: MEDICARE

## 2022-05-05 ENCOUNTER — LAB VISIT (OUTPATIENT)
Dept: LAB | Facility: HOSPITAL | Age: 72
End: 2022-05-05
Attending: FAMILY MEDICINE
Payer: MEDICARE

## 2022-05-05 DIAGNOSIS — E78.49 OTHER HYPERLIPIDEMIA: ICD-10-CM

## 2022-05-05 LAB
ALBUMIN SERPL BCP-MCNC: 4.3 G/DL (ref 3.5–5.2)
ALP SERPL-CCNC: 92 U/L (ref 55–135)
ALT SERPL W/O P-5'-P-CCNC: 14 U/L (ref 10–44)
ANION GAP SERPL CALC-SCNC: 11 MMOL/L (ref 8–16)
AST SERPL-CCNC: 11 U/L (ref 10–40)
BILIRUB SERPL-MCNC: 0.6 MG/DL (ref 0.1–1)
BUN SERPL-MCNC: 10 MG/DL (ref 8–23)
CALCIUM SERPL-MCNC: 9.5 MG/DL (ref 8.7–10.5)
CHLORIDE SERPL-SCNC: 106 MMOL/L (ref 95–110)
CHOLEST SERPL-MCNC: 193 MG/DL (ref 120–199)
CHOLEST/HDLC SERPL: 3.6 {RATIO} (ref 2–5)
CO2 SERPL-SCNC: 24 MMOL/L (ref 23–29)
CREAT SERPL-MCNC: 0.9 MG/DL (ref 0.5–1.4)
EST. GFR  (AFRICAN AMERICAN): >60 ML/MIN/1.73 M^2
EST. GFR  (NON AFRICAN AMERICAN): >60 ML/MIN/1.73 M^2
GLUCOSE SERPL-MCNC: 89 MG/DL (ref 70–110)
HDLC SERPL-MCNC: 54 MG/DL (ref 40–75)
HDLC SERPL: 28 % (ref 20–50)
LDLC SERPL CALC-MCNC: 105.2 MG/DL (ref 63–159)
NONHDLC SERPL-MCNC: 139 MG/DL
POTASSIUM SERPL-SCNC: 4.1 MMOL/L (ref 3.5–5.1)
PROT SERPL-MCNC: 7.3 G/DL (ref 6–8.4)
SODIUM SERPL-SCNC: 141 MMOL/L (ref 136–145)
TRIGL SERPL-MCNC: 169 MG/DL (ref 30–150)
TSH SERPL DL<=0.005 MIU/L-ACNC: 1.16 UIU/ML (ref 0.4–4)

## 2022-05-05 PROCEDURE — 80061 LIPID PANEL: CPT | Performed by: FAMILY MEDICINE

## 2022-05-05 PROCEDURE — 80053 COMPREHEN METABOLIC PANEL: CPT | Performed by: FAMILY MEDICINE

## 2022-05-05 PROCEDURE — 36415 COLL VENOUS BLD VENIPUNCTURE: CPT | Mod: PO | Performed by: FAMILY MEDICINE

## 2022-05-05 PROCEDURE — 84443 ASSAY THYROID STIM HORMONE: CPT | Performed by: FAMILY MEDICINE

## 2022-06-06 ENCOUNTER — OFFICE VISIT (OUTPATIENT)
Dept: INTERNAL MEDICINE | Facility: CLINIC | Age: 72
End: 2022-06-06
Payer: MEDICARE

## 2022-06-06 DIAGNOSIS — R05.9 COUGH: ICD-10-CM

## 2022-06-06 DIAGNOSIS — J06.9 UPPER RESPIRATORY INFECTION, VIRAL: Primary | ICD-10-CM

## 2022-06-06 LAB
CTP QC/QA: YES
CTP QC/QA: YES
POC MOLECULAR INFLUENZA A AGN: NEGATIVE
POC MOLECULAR INFLUENZA B AGN: NEGATIVE
SARS-COV-2 RDRP RESP QL NAA+PROBE: NEGATIVE

## 2022-06-06 PROCEDURE — 3061F NEG MICROALBUMINURIA REV: CPT | Mod: CPTII,95,, | Performed by: NURSE PRACTITIONER

## 2022-06-06 PROCEDURE — 99213 OFFICE O/P EST LOW 20 MIN: CPT | Mod: 95,,, | Performed by: NURSE PRACTITIONER

## 2022-06-06 PROCEDURE — 3066F PR DOCUMENTATION OF TREATMENT FOR NEPHROPATHY: ICD-10-PCS | Mod: CPTII,95,, | Performed by: NURSE PRACTITIONER

## 2022-06-06 PROCEDURE — 1160F PR REVIEW ALL MEDS BY PRESCRIBER/CLIN PHARMACIST DOCUMENTED: ICD-10-PCS | Mod: CPTII,95,, | Performed by: NURSE PRACTITIONER

## 2022-06-06 PROCEDURE — 3066F NEPHROPATHY DOC TX: CPT | Mod: CPTII,95,, | Performed by: NURSE PRACTITIONER

## 2022-06-06 PROCEDURE — 1159F PR MEDICATION LIST DOCUMENTED IN MEDICAL RECORD: ICD-10-PCS | Mod: CPTII,95,, | Performed by: NURSE PRACTITIONER

## 2022-06-06 PROCEDURE — 1160F RVW MEDS BY RX/DR IN RCRD: CPT | Mod: CPTII,95,, | Performed by: NURSE PRACTITIONER

## 2022-06-06 PROCEDURE — 3044F PR MOST RECENT HEMOGLOBIN A1C LEVEL <7.0%: ICD-10-PCS | Mod: CPTII,95,, | Performed by: NURSE PRACTITIONER

## 2022-06-06 PROCEDURE — 99213 PR OFFICE/OUTPT VISIT, EST, LEVL III, 20-29 MIN: ICD-10-PCS | Mod: 95,,, | Performed by: NURSE PRACTITIONER

## 2022-06-06 PROCEDURE — 1159F MED LIST DOCD IN RCRD: CPT | Mod: CPTII,95,, | Performed by: NURSE PRACTITIONER

## 2022-06-06 PROCEDURE — 3044F HG A1C LEVEL LT 7.0%: CPT | Mod: CPTII,95,, | Performed by: NURSE PRACTITIONER

## 2022-06-06 PROCEDURE — 3061F PR NEG MICROALBUMINURIA RESULT DOCUMENTED/REVIEW: ICD-10-PCS | Mod: CPTII,95,, | Performed by: NURSE PRACTITIONER

## 2022-06-06 NOTE — PROGRESS NOTES
Subjective:       Patient ID: Kailey Stokes is a 71 y.o. female.    Chief Complaint: Cough    The patient location is: home/[place]  The chief complaint leading to consultation is: cough  Visit type: Audiovisual  Each patient to whom he or she provides medical services by telemedicine is:  (1) informed of the relationship between the physician and patient and the respective role of any other health care provider with respect to management of the patient; and (2) notified that he or she may decline to receive medical services by telemedicine and may withdraw from such care at any time.      Pt reports she started not feeling well on Friday morning. Cough/nasal pain/sore throat.   Reports sweats, no known fever, some headaches, questionable body aches  Tolerating diet  Home COVID test negative Saturday.  No ill contacts    Tried Singular- with little to no relief. Concerned she may have the flu    Cough  This is a new problem. The current episode started in the past 7 days. The problem has been gradually worsening. The problem occurs constantly. The cough is non-productive. Associated symptoms include chills, headaches, postnasal drip and sweats. Pertinent negatives include no chest pain, ear congestion, ear pain, fever, heartburn, hemoptysis, myalgias, nasal congestion, rash, rhinorrhea, sore throat, shortness of breath, weight loss or wheezing. Nothing aggravates the symptoms. She has tried leukotriene antagonists and rest for the symptoms. The treatment provided no relief. Her past medical history is significant for environmental allergies. There is no history of asthma, bronchiectasis, bronchitis, COPD, emphysema or pneumonia.       There were no vitals taken for this visit.    Review of Systems   Constitutional: Positive for chills. Negative for fever and weight loss.   HENT: Positive for postnasal drip. Negative for ear pain, rhinorrhea and sore throat.    Respiratory: Positive for cough. Negative for hemoptysis,  shortness of breath and wheezing.    Cardiovascular: Negative for chest pain.   Gastrointestinal: Negative for heartburn.   Genitourinary: Negative for dysuria.   Musculoskeletal: Negative for myalgias.   Skin: Negative for rash.   Allergic/Immunologic: Positive for environmental allergies.   Neurological: Positive for headaches.   Psychiatric/Behavioral: Negative for agitation and behavioral problems.       Objective:      Physical Exam  Constitutional:       General: She is not in acute distress.     Appearance: She is well-developed. She is not diaphoretic.   HENT:      Head: Normocephalic and atraumatic.   Eyes:      General: No scleral icterus.        Right eye: No discharge.         Left eye: No discharge.      Conjunctiva/sclera: Conjunctivae normal.   Pulmonary:      Effort: Pulmonary effort is normal. No tachypnea, accessory muscle usage or respiratory distress.      Breath sounds: No stridor.   Musculoskeletal:      Cervical back: Normal range of motion and neck supple.   Skin:     Findings: No rash.   Neurological:      Mental Status: She is alert. She is not disoriented.   Psychiatric:         Attention and Perception: She is attentive.         Mood and Affect: Mood is not anxious or depressed. Affect is not labile, blunt, angry or inappropriate.         Speech: Speech normal.         Behavior: Behavior normal.         Thought Content: Thought content normal.         Judgment: Judgment normal.         Assessment:       1. Upper respiratory infection, viral    2. Cough        Plan:       Kailey was seen today for cough.    Diagnoses and all orders for this visit:    Upper respiratory infection, viral       - COVID/Flu neg       - Advised pt Zyrtec and Flonase to help with symptoms. Push fluids. If no improvement in 2 days follow up in clinic for reassessment    Cough  -     POCT Influenza A/B Molecular  -     POCT COVID-19 Rapid Screening

## 2022-06-27 ENCOUNTER — OFFICE VISIT (OUTPATIENT)
Dept: INTERNAL MEDICINE | Facility: CLINIC | Age: 72
End: 2022-06-27
Payer: MEDICARE

## 2022-06-27 ENCOUNTER — HOSPITAL ENCOUNTER (OUTPATIENT)
Dept: RADIOLOGY | Facility: HOSPITAL | Age: 72
Discharge: HOME | End: 2022-06-27
Attending: NURSE PRACTITIONER
Payer: MEDICARE

## 2022-06-27 VITALS
HEIGHT: 69 IN | WEIGHT: 194.44 LBS | SYSTOLIC BLOOD PRESSURE: 100 MMHG | BODY MASS INDEX: 28.8 KG/M2 | TEMPERATURE: 98 F | HEART RATE: 86 BPM | DIASTOLIC BLOOD PRESSURE: 80 MMHG

## 2022-06-27 DIAGNOSIS — I10 HYPERTENSION, UNSPECIFIED TYPE: ICD-10-CM

## 2022-06-27 DIAGNOSIS — K59.00 CONSTIPATION, UNSPECIFIED CONSTIPATION TYPE: Primary | ICD-10-CM

## 2022-06-27 DIAGNOSIS — K59.00 CONSTIPATION, UNSPECIFIED CONSTIPATION TYPE: ICD-10-CM

## 2022-06-27 DIAGNOSIS — K21.9 GASTROESOPHAGEAL REFLUX DISEASE, UNSPECIFIED WHETHER ESOPHAGITIS PRESENT: ICD-10-CM

## 2022-06-27 PROCEDURE — 3008F BODY MASS INDEX DOCD: CPT | Mod: CPTII,S$GLB,, | Performed by: NURSE PRACTITIONER

## 2022-06-27 PROCEDURE — 74018 RADEX ABDOMEN 1 VIEW: CPT | Mod: TC,FY,PO

## 2022-06-27 PROCEDURE — 1125F AMNT PAIN NOTED PAIN PRSNT: CPT | Mod: CPTII,S$GLB,, | Performed by: NURSE PRACTITIONER

## 2022-06-27 PROCEDURE — 1160F PR REVIEW ALL MEDS BY PRESCRIBER/CLIN PHARMACIST DOCUMENTED: ICD-10-PCS | Mod: CPTII,S$GLB,, | Performed by: NURSE PRACTITIONER

## 2022-06-27 PROCEDURE — 1160F RVW MEDS BY RX/DR IN RCRD: CPT | Mod: CPTII,S$GLB,, | Performed by: NURSE PRACTITIONER

## 2022-06-27 PROCEDURE — 3079F DIAST BP 80-89 MM HG: CPT | Mod: CPTII,S$GLB,, | Performed by: NURSE PRACTITIONER

## 2022-06-27 PROCEDURE — 1159F MED LIST DOCD IN RCRD: CPT | Mod: CPTII,S$GLB,, | Performed by: NURSE PRACTITIONER

## 2022-06-27 PROCEDURE — 1125F PR PAIN SEVERITY QUANTIFIED, PAIN PRESENT: ICD-10-PCS | Mod: CPTII,S$GLB,, | Performed by: NURSE PRACTITIONER

## 2022-06-27 PROCEDURE — 1159F PR MEDICATION LIST DOCUMENTED IN MEDICAL RECORD: ICD-10-PCS | Mod: CPTII,S$GLB,, | Performed by: NURSE PRACTITIONER

## 2022-06-27 PROCEDURE — 3066F PR DOCUMENTATION OF TREATMENT FOR NEPHROPATHY: ICD-10-PCS | Mod: CPTII,S$GLB,, | Performed by: NURSE PRACTITIONER

## 2022-06-27 PROCEDURE — 3288F FALL RISK ASSESSMENT DOCD: CPT | Mod: CPTII,S$GLB,, | Performed by: NURSE PRACTITIONER

## 2022-06-27 PROCEDURE — 3044F HG A1C LEVEL LT 7.0%: CPT | Mod: CPTII,S$GLB,, | Performed by: NURSE PRACTITIONER

## 2022-06-27 PROCEDURE — 3044F PR MOST RECENT HEMOGLOBIN A1C LEVEL <7.0%: ICD-10-PCS | Mod: CPTII,S$GLB,, | Performed by: NURSE PRACTITIONER

## 2022-06-27 PROCEDURE — 3061F NEG MICROALBUMINURIA REV: CPT | Mod: CPTII,S$GLB,, | Performed by: NURSE PRACTITIONER

## 2022-06-27 PROCEDURE — 3288F PR FALLS RISK ASSESSMENT DOCUMENTED: ICD-10-PCS | Mod: CPTII,S$GLB,, | Performed by: NURSE PRACTITIONER

## 2022-06-27 PROCEDURE — 99214 PR OFFICE/OUTPT VISIT, EST, LEVL IV, 30-39 MIN: ICD-10-PCS | Mod: S$GLB,,, | Performed by: NURSE PRACTITIONER

## 2022-06-27 PROCEDURE — 99999 PR PBB SHADOW E&M-EST. PATIENT-LVL IV: CPT | Mod: PBBFAC,,, | Performed by: NURSE PRACTITIONER

## 2022-06-27 PROCEDURE — 1101F PR PT FALLS ASSESS DOC 0-1 FALLS W/OUT INJ PAST YR: ICD-10-PCS | Mod: CPTII,S$GLB,, | Performed by: NURSE PRACTITIONER

## 2022-06-27 PROCEDURE — 3079F PR MOST RECENT DIASTOLIC BLOOD PRESSURE 80-89 MM HG: ICD-10-PCS | Mod: CPTII,S$GLB,, | Performed by: NURSE PRACTITIONER

## 2022-06-27 PROCEDURE — 99214 OFFICE O/P EST MOD 30 MIN: CPT | Mod: S$GLB,,, | Performed by: NURSE PRACTITIONER

## 2022-06-27 PROCEDURE — 99999 PR PBB SHADOW E&M-EST. PATIENT-LVL IV: ICD-10-PCS | Mod: PBBFAC,,, | Performed by: NURSE PRACTITIONER

## 2022-06-27 PROCEDURE — 3008F PR BODY MASS INDEX (BMI) DOCUMENTED: ICD-10-PCS | Mod: CPTII,S$GLB,, | Performed by: NURSE PRACTITIONER

## 2022-06-27 PROCEDURE — 1101F PT FALLS ASSESS-DOCD LE1/YR: CPT | Mod: CPTII,S$GLB,, | Performed by: NURSE PRACTITIONER

## 2022-06-27 PROCEDURE — 3074F SYST BP LT 130 MM HG: CPT | Mod: CPTII,S$GLB,, | Performed by: NURSE PRACTITIONER

## 2022-06-27 PROCEDURE — 74018 XR ABDOMEN AP 1 VIEW: ICD-10-PCS | Mod: 26,,, | Performed by: RADIOLOGY

## 2022-06-27 PROCEDURE — 3074F PR MOST RECENT SYSTOLIC BLOOD PRESSURE < 130 MM HG: ICD-10-PCS | Mod: CPTII,S$GLB,, | Performed by: NURSE PRACTITIONER

## 2022-06-27 PROCEDURE — 74018 RADEX ABDOMEN 1 VIEW: CPT | Mod: 26,,, | Performed by: RADIOLOGY

## 2022-06-27 PROCEDURE — 3061F PR NEG MICROALBUMINURIA RESULT DOCUMENTED/REVIEW: ICD-10-PCS | Mod: CPTII,S$GLB,, | Performed by: NURSE PRACTITIONER

## 2022-06-27 PROCEDURE — 3066F NEPHROPATHY DOC TX: CPT | Mod: CPTII,S$GLB,, | Performed by: NURSE PRACTITIONER

## 2022-06-27 NOTE — PROGRESS NOTES
"Subjective:       Patient ID: Kailey Stokes is a 71 y.o. female.    Chief Complaint: Follow-up and Rectal Pain    Patient here for bowel concerns  Has had constipation lately  Does not have a bm unless she uses enema or suppository  No blood in stool   There is some clear mucous in stool  Stool is small stacy  Had rectal pain a few times but no blood  c scope 2018, plan to repeat 2023    Follow-up  Pertinent negatives include no chest pain, chills, coughing, diaphoresis, fatigue, fever or rash.       /80   Pulse 86   Temp 97.9 °F (36.6 °C)   Ht 5' 9" (1.753 m)   Wt 88.2 kg (194 lb 7.1 oz)   BMI 28.71 kg/m²     Review of Systems   Constitutional: Negative for activity change, appetite change, chills, diaphoresis, fatigue, fever and unexpected weight change.   HENT: Negative.    Respiratory: Negative for cough and shortness of breath.    Cardiovascular: Negative for chest pain, palpitations and leg swelling.   Gastrointestinal: Negative.    Genitourinary: Negative.    Musculoskeletal: Negative.    Skin: Negative for color change, pallor, rash and wound.   Allergic/Immunologic: Negative for immunocompromised state.   Neurological: Negative.  Negative for dizziness and facial asymmetry.   Hematological: Negative for adenopathy. Does not bruise/bleed easily.   Psychiatric/Behavioral: Negative for agitation, behavioral problems and confusion.       Objective:      Physical Exam  Vitals and nursing note reviewed.   Constitutional:       General: She is not in acute distress.     Appearance: She is well-developed. She is not diaphoretic.   HENT:      Head: Normocephalic and atraumatic.   Cardiovascular:      Rate and Rhythm: Normal rate and regular rhythm.      Heart sounds: Normal heart sounds. No murmur heard.  Pulmonary:      Effort: Pulmonary effort is normal. No respiratory distress.      Breath sounds: Normal breath sounds.   Abdominal:      General: Abdomen is flat. There is no distension.      " Palpations: There is no mass.      Tenderness: There is abdominal tenderness (mild, generalized).      Hernia: No hernia is present.   Musculoskeletal:         General: Normal range of motion.   Skin:     General: Skin is warm and dry.      Findings: No rash.   Neurological:      Mental Status: She is alert.   Psychiatric:         Behavior: Behavior normal. Behavior is cooperative.         Thought Content: Thought content normal.         Judgment: Judgment normal.         Assessment:       1. Constipation, unspecified constipation type    2. Hypertension, unspecified type    3. Gastroesophageal reflux disease, unspecified whether esophagitis present        Plan:       Kailey was seen today for follow-up and rectal pain.    Diagnoses and all orders for this visit:    Constipation, unspecified constipation type  -     X-Ray Abdomen AP 1 View; Future  Mg citrate today and repeat tomorrow  Then start miralax 1 cap daily   Gastro if not improving    Hypertension, unspecified type  Stable on amlodipine    Gastroesophageal reflux disease, unspecified whether esophagitis present  Stable on ppi

## 2022-07-18 ENCOUNTER — OFFICE VISIT (OUTPATIENT)
Dept: DERMATOLOGY | Facility: CLINIC | Age: 72
End: 2022-07-18
Payer: MEDICARE

## 2022-07-18 DIAGNOSIS — L82.1 SEBORRHEIC KERATOSIS: ICD-10-CM

## 2022-07-18 DIAGNOSIS — L65.8 FEMALE PATTERN HAIR LOSS: Primary | ICD-10-CM

## 2022-07-18 PROCEDURE — 1126F PR PAIN SEVERITY QUANTIFIED, NO PAIN PRESENT: ICD-10-PCS | Mod: CPTII,S$GLB,, | Performed by: DERMATOLOGY

## 2022-07-18 PROCEDURE — 1126F AMNT PAIN NOTED NONE PRSNT: CPT | Mod: CPTII,S$GLB,, | Performed by: DERMATOLOGY

## 2022-07-18 PROCEDURE — 99999 PR PBB SHADOW E&M-EST. PATIENT-LVL III: ICD-10-PCS | Mod: PBBFAC,,, | Performed by: DERMATOLOGY

## 2022-07-18 PROCEDURE — 1159F MED LIST DOCD IN RCRD: CPT | Mod: CPTII,S$GLB,, | Performed by: DERMATOLOGY

## 2022-07-18 PROCEDURE — 1160F RVW MEDS BY RX/DR IN RCRD: CPT | Mod: CPTII,S$GLB,, | Performed by: DERMATOLOGY

## 2022-07-18 PROCEDURE — 3288F FALL RISK ASSESSMENT DOCD: CPT | Mod: CPTII,S$GLB,, | Performed by: DERMATOLOGY

## 2022-07-18 PROCEDURE — 3044F HG A1C LEVEL LT 7.0%: CPT | Mod: CPTII,S$GLB,, | Performed by: DERMATOLOGY

## 2022-07-18 PROCEDURE — 99214 PR OFFICE/OUTPT VISIT, EST, LEVL IV, 30-39 MIN: ICD-10-PCS | Mod: S$GLB,,, | Performed by: DERMATOLOGY

## 2022-07-18 PROCEDURE — 1159F PR MEDICATION LIST DOCUMENTED IN MEDICAL RECORD: ICD-10-PCS | Mod: CPTII,S$GLB,, | Performed by: DERMATOLOGY

## 2022-07-18 PROCEDURE — 1160F PR REVIEW ALL MEDS BY PRESCRIBER/CLIN PHARMACIST DOCUMENTED: ICD-10-PCS | Mod: CPTII,S$GLB,, | Performed by: DERMATOLOGY

## 2022-07-18 PROCEDURE — 3066F PR DOCUMENTATION OF TREATMENT FOR NEPHROPATHY: ICD-10-PCS | Mod: CPTII,S$GLB,, | Performed by: DERMATOLOGY

## 2022-07-18 PROCEDURE — 1101F PR PT FALLS ASSESS DOC 0-1 FALLS W/OUT INJ PAST YR: ICD-10-PCS | Mod: CPTII,S$GLB,, | Performed by: DERMATOLOGY

## 2022-07-18 PROCEDURE — 99214 OFFICE O/P EST MOD 30 MIN: CPT | Mod: S$GLB,,, | Performed by: DERMATOLOGY

## 2022-07-18 PROCEDURE — 3061F PR NEG MICROALBUMINURIA RESULT DOCUMENTED/REVIEW: ICD-10-PCS | Mod: CPTII,S$GLB,, | Performed by: DERMATOLOGY

## 2022-07-18 PROCEDURE — 3044F PR MOST RECENT HEMOGLOBIN A1C LEVEL <7.0%: ICD-10-PCS | Mod: CPTII,S$GLB,, | Performed by: DERMATOLOGY

## 2022-07-18 PROCEDURE — 3288F PR FALLS RISK ASSESSMENT DOCUMENTED: ICD-10-PCS | Mod: CPTII,S$GLB,, | Performed by: DERMATOLOGY

## 2022-07-18 PROCEDURE — 3061F NEG MICROALBUMINURIA REV: CPT | Mod: CPTII,S$GLB,, | Performed by: DERMATOLOGY

## 2022-07-18 PROCEDURE — 99999 PR PBB SHADOW E&M-EST. PATIENT-LVL III: CPT | Mod: PBBFAC,,, | Performed by: DERMATOLOGY

## 2022-07-18 PROCEDURE — 3066F NEPHROPATHY DOC TX: CPT | Mod: CPTII,S$GLB,, | Performed by: DERMATOLOGY

## 2022-07-18 PROCEDURE — 1101F PT FALLS ASSESS-DOCD LE1/YR: CPT | Mod: CPTII,S$GLB,, | Performed by: DERMATOLOGY

## 2022-07-18 RX ORDER — MOMETASONE FUROATE 1 MG/ML
SOLUTION TOPICAL
Qty: 60 ML | Refills: 3 | Status: SHIPPED | OUTPATIENT
Start: 2022-07-18 | End: 2023-04-17 | Stop reason: SDUPTHER

## 2022-07-18 NOTE — PROGRESS NOTES
Subjective:       Patient ID:  Kailey Stokes is a 72 y.o. female who presents for   Chief Complaint   Patient presents with    Spot     Mole on leg pt requesting to have checked  No family or personal hx of skin cancer    Hair Loss     Pt seen in 2021 for hair loss; pt reports improvement since last visit; however, it is still not as thick as she would like.      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  She is currently using biotin daily and is s/p fluocinolone oil.  Hair has improved with decreased thinning.     Mother c/ hx of hair thinning.    TSH wnl 5/2022.             Review of Systems   Constitutional: Negative for fever and chills.   Gastrointestinal: Negative for nausea and vomiting.   Skin: Positive for activity-related sunscreen use. Negative for daily sunscreen use and recent sunburn.   Hematologic/Lymphatic: Does not bruise/bleed easily.        Objective:    Physical Exam   Constitutional: She appears well-developed and well-nourished. No distress.   Neurological: She is alert and oriented to person, place, and time. She is not disoriented.   Psychiatric: She has a normal mood and affect.   Skin:   Areas Examined (abnormalities noted in diagram):   Scalp / Hair Palpated and Inspected  Head / Face Inspection Performed  Neck Inspection Performed  RUE Inspected  LUE Inspection Performed  Nails and Digits Inspection Performed                   Diagram Legend      Pigmented verrucoid papule/plaque c/w seborrheic keratosis       Assessment / Plan:        Female pattern hair loss  -     mometasone (ELOCON) 0.1 % solution; Apply to scalp once daily  Dispense: 60 mL; Refill: 3  -     Consider Hairstim, pt currently defers. Continue biotin.  Will start above med. Potentially related to thyroid nodules, however TSH wnl 5/2022 (reviewed)    Seborrheic keratosis  Reassurance given. Discussed diagnosis and that lesions are benign.  AAD handout given.               Follow up in about 6 months (around 1/18/2023).

## 2022-08-11 ENCOUNTER — LAB VISIT (OUTPATIENT)
Dept: LAB | Facility: HOSPITAL | Age: 72
End: 2022-08-11
Attending: FAMILY MEDICINE
Payer: MEDICARE

## 2022-08-11 ENCOUNTER — OFFICE VISIT (OUTPATIENT)
Dept: INTERNAL MEDICINE | Facility: CLINIC | Age: 72
End: 2022-08-11
Payer: MEDICARE

## 2022-08-11 VITALS
TEMPERATURE: 97 F | DIASTOLIC BLOOD PRESSURE: 70 MMHG | SYSTOLIC BLOOD PRESSURE: 120 MMHG | OXYGEN SATURATION: 97 % | HEIGHT: 69 IN | WEIGHT: 195.31 LBS | HEART RATE: 87 BPM | BODY MASS INDEX: 28.93 KG/M2

## 2022-08-11 DIAGNOSIS — R73.09 ABNORMAL GLUCOSE: ICD-10-CM

## 2022-08-11 DIAGNOSIS — R68.89 COLD INTOLERANCE: ICD-10-CM

## 2022-08-11 DIAGNOSIS — E78.5 HYPERLIPIDEMIA, UNSPECIFIED HYPERLIPIDEMIA TYPE: ICD-10-CM

## 2022-08-11 DIAGNOSIS — K21.9 GASTROESOPHAGEAL REFLUX DISEASE, UNSPECIFIED WHETHER ESOPHAGITIS PRESENT: ICD-10-CM

## 2022-08-11 DIAGNOSIS — I10 HYPERTENSION, UNSPECIFIED TYPE: ICD-10-CM

## 2022-08-11 DIAGNOSIS — F41.1 GAD (GENERALIZED ANXIETY DISORDER): ICD-10-CM

## 2022-08-11 DIAGNOSIS — Z00.00 ANNUAL PHYSICAL EXAM: Primary | ICD-10-CM

## 2022-08-11 LAB
ALBUMIN SERPL BCP-MCNC: 4.3 G/DL (ref 3.5–5.2)
ALP SERPL-CCNC: 86 U/L (ref 55–135)
ALT SERPL W/O P-5'-P-CCNC: 41 U/L (ref 10–44)
ANION GAP SERPL CALC-SCNC: 9 MMOL/L (ref 8–16)
AST SERPL-CCNC: 37 U/L (ref 10–40)
BASOPHILS # BLD AUTO: 0.06 K/UL (ref 0–0.2)
BASOPHILS NFR BLD: 1 % (ref 0–1.9)
BILIRUB SERPL-MCNC: 0.7 MG/DL (ref 0.1–1)
BUN SERPL-MCNC: 11 MG/DL (ref 8–23)
CALCIUM SERPL-MCNC: 9.8 MG/DL (ref 8.7–10.5)
CHLORIDE SERPL-SCNC: 105 MMOL/L (ref 95–110)
CHOLEST SERPL-MCNC: 178 MG/DL (ref 120–199)
CHOLEST/HDLC SERPL: 3.4 {RATIO} (ref 2–5)
CO2 SERPL-SCNC: 28 MMOL/L (ref 23–29)
CREAT SERPL-MCNC: 1.3 MG/DL (ref 0.5–1.4)
DIFFERENTIAL METHOD: NORMAL
EOSINOPHIL # BLD AUTO: 0.1 K/UL (ref 0–0.5)
EOSINOPHIL NFR BLD: 2.2 % (ref 0–8)
ERYTHROCYTE [DISTWIDTH] IN BLOOD BY AUTOMATED COUNT: 13.7 % (ref 11.5–14.5)
EST. GFR  (NO RACE VARIABLE): 44.2 ML/MIN/1.73 M^2
ESTIMATED AVG GLUCOSE: 117 MG/DL (ref 68–131)
FERRITIN SERPL-MCNC: 200 NG/ML (ref 20–300)
GLUCOSE SERPL-MCNC: 95 MG/DL (ref 70–110)
HBA1C MFR BLD: 5.7 % (ref 4–5.6)
HCT VFR BLD AUTO: 41.1 % (ref 37–48.5)
HDLC SERPL-MCNC: 53 MG/DL (ref 40–75)
HDLC SERPL: 29.8 % (ref 20–50)
HGB BLD-MCNC: 13.9 G/DL (ref 12–16)
IMM GRANULOCYTES # BLD AUTO: 0.01 K/UL (ref 0–0.04)
IMM GRANULOCYTES NFR BLD AUTO: 0.2 % (ref 0–0.5)
IRON SERPL-MCNC: 73 UG/DL (ref 30–160)
LDLC SERPL CALC-MCNC: 91.6 MG/DL (ref 63–159)
LYMPHOCYTES # BLD AUTO: 2.2 K/UL (ref 1–4.8)
LYMPHOCYTES NFR BLD: 37.1 % (ref 18–48)
MCH RBC QN AUTO: 28.9 PG (ref 27–31)
MCHC RBC AUTO-ENTMCNC: 33.8 G/DL (ref 32–36)
MCV RBC AUTO: 85 FL (ref 82–98)
MONOCYTES # BLD AUTO: 0.5 K/UL (ref 0.3–1)
MONOCYTES NFR BLD: 8.5 % (ref 4–15)
NEUTROPHILS # BLD AUTO: 3 K/UL (ref 1.8–7.7)
NEUTROPHILS NFR BLD: 51 % (ref 38–73)
NONHDLC SERPL-MCNC: 125 MG/DL
NRBC BLD-RTO: 0 /100 WBC
PLATELET # BLD AUTO: 249 K/UL (ref 150–450)
PMV BLD AUTO: 10.1 FL (ref 9.2–12.9)
POTASSIUM SERPL-SCNC: 4.1 MMOL/L (ref 3.5–5.1)
PROT SERPL-MCNC: 7 G/DL (ref 6–8.4)
RBC # BLD AUTO: 4.81 M/UL (ref 4–5.4)
SATURATED IRON: 23 % (ref 20–50)
SODIUM SERPL-SCNC: 142 MMOL/L (ref 136–145)
TOTAL IRON BINDING CAPACITY: 318 UG/DL (ref 250–450)
TRANSFERRIN SERPL-MCNC: 215 MG/DL (ref 200–375)
TRIGL SERPL-MCNC: 167 MG/DL (ref 30–150)
TSH SERPL DL<=0.005 MIU/L-ACNC: 2.19 UIU/ML (ref 0.4–4)
WBC # BLD AUTO: 5.85 K/UL (ref 3.9–12.7)

## 2022-08-11 PROCEDURE — 1126F AMNT PAIN NOTED NONE PRSNT: CPT | Mod: CPTII,S$GLB,, | Performed by: FAMILY MEDICINE

## 2022-08-11 PROCEDURE — 80061 LIPID PANEL: CPT | Performed by: FAMILY MEDICINE

## 2022-08-11 PROCEDURE — 3061F NEG MICROALBUMINURIA REV: CPT | Mod: CPTII,S$GLB,, | Performed by: FAMILY MEDICINE

## 2022-08-11 PROCEDURE — 3074F SYST BP LT 130 MM HG: CPT | Mod: CPTII,S$GLB,, | Performed by: FAMILY MEDICINE

## 2022-08-11 PROCEDURE — 80053 COMPREHEN METABOLIC PANEL: CPT | Performed by: FAMILY MEDICINE

## 2022-08-11 PROCEDURE — 99999 PR PBB SHADOW E&M-EST. PATIENT-LVL IV: ICD-10-PCS | Mod: PBBFAC,,, | Performed by: FAMILY MEDICINE

## 2022-08-11 PROCEDURE — 3061F PR NEG MICROALBUMINURIA RESULT DOCUMENTED/REVIEW: ICD-10-PCS | Mod: CPTII,S$GLB,, | Performed by: FAMILY MEDICINE

## 2022-08-11 PROCEDURE — 3044F HG A1C LEVEL LT 7.0%: CPT | Mod: CPTII,S$GLB,, | Performed by: FAMILY MEDICINE

## 2022-08-11 PROCEDURE — 3066F NEPHROPATHY DOC TX: CPT | Mod: CPTII,S$GLB,, | Performed by: FAMILY MEDICINE

## 2022-08-11 PROCEDURE — 1101F PR PT FALLS ASSESS DOC 0-1 FALLS W/OUT INJ PAST YR: ICD-10-PCS | Mod: CPTII,S$GLB,, | Performed by: FAMILY MEDICINE

## 2022-08-11 PROCEDURE — 1126F PR PAIN SEVERITY QUANTIFIED, NO PAIN PRESENT: ICD-10-PCS | Mod: CPTII,S$GLB,, | Performed by: FAMILY MEDICINE

## 2022-08-11 PROCEDURE — 99214 PR OFFICE/OUTPT VISIT, EST, LEVL IV, 30-39 MIN: ICD-10-PCS | Mod: 25,S$GLB,, | Performed by: FAMILY MEDICINE

## 2022-08-11 PROCEDURE — 3288F FALL RISK ASSESSMENT DOCD: CPT | Mod: CPTII,S$GLB,, | Performed by: FAMILY MEDICINE

## 2022-08-11 PROCEDURE — 84443 ASSAY THYROID STIM HORMONE: CPT | Performed by: FAMILY MEDICINE

## 2022-08-11 PROCEDURE — 3044F PR MOST RECENT HEMOGLOBIN A1C LEVEL <7.0%: ICD-10-PCS | Mod: CPTII,S$GLB,, | Performed by: FAMILY MEDICINE

## 2022-08-11 PROCEDURE — 3074F PR MOST RECENT SYSTOLIC BLOOD PRESSURE < 130 MM HG: ICD-10-PCS | Mod: CPTII,S$GLB,, | Performed by: FAMILY MEDICINE

## 2022-08-11 PROCEDURE — 3066F PR DOCUMENTATION OF TREATMENT FOR NEPHROPATHY: ICD-10-PCS | Mod: CPTII,S$GLB,, | Performed by: FAMILY MEDICINE

## 2022-08-11 PROCEDURE — 3078F DIAST BP <80 MM HG: CPT | Mod: CPTII,S$GLB,, | Performed by: FAMILY MEDICINE

## 2022-08-11 PROCEDURE — 1160F RVW MEDS BY RX/DR IN RCRD: CPT | Mod: CPTII,S$GLB,, | Performed by: FAMILY MEDICINE

## 2022-08-11 PROCEDURE — 83036 HEMOGLOBIN GLYCOSYLATED A1C: CPT | Performed by: FAMILY MEDICINE

## 2022-08-11 PROCEDURE — 1160F PR REVIEW ALL MEDS BY PRESCRIBER/CLIN PHARMACIST DOCUMENTED: ICD-10-PCS | Mod: CPTII,S$GLB,, | Performed by: FAMILY MEDICINE

## 2022-08-11 PROCEDURE — 1159F PR MEDICATION LIST DOCUMENTED IN MEDICAL RECORD: ICD-10-PCS | Mod: CPTII,S$GLB,, | Performed by: FAMILY MEDICINE

## 2022-08-11 PROCEDURE — 1101F PT FALLS ASSESS-DOCD LE1/YR: CPT | Mod: CPTII,S$GLB,, | Performed by: FAMILY MEDICINE

## 2022-08-11 PROCEDURE — 99397 PR PREVENTIVE VISIT,EST,65 & OVER: ICD-10-PCS | Mod: S$GLB,,, | Performed by: FAMILY MEDICINE

## 2022-08-11 PROCEDURE — 3288F PR FALLS RISK ASSESSMENT DOCUMENTED: ICD-10-PCS | Mod: CPTII,S$GLB,, | Performed by: FAMILY MEDICINE

## 2022-08-11 PROCEDURE — 36415 COLL VENOUS BLD VENIPUNCTURE: CPT | Mod: PO | Performed by: FAMILY MEDICINE

## 2022-08-11 PROCEDURE — 3008F PR BODY MASS INDEX (BMI) DOCUMENTED: ICD-10-PCS | Mod: CPTII,S$GLB,, | Performed by: FAMILY MEDICINE

## 2022-08-11 PROCEDURE — 3078F PR MOST RECENT DIASTOLIC BLOOD PRESSURE < 80 MM HG: ICD-10-PCS | Mod: CPTII,S$GLB,, | Performed by: FAMILY MEDICINE

## 2022-08-11 PROCEDURE — 3008F BODY MASS INDEX DOCD: CPT | Mod: CPTII,S$GLB,, | Performed by: FAMILY MEDICINE

## 2022-08-11 PROCEDURE — 82728 ASSAY OF FERRITIN: CPT | Mod: GA | Performed by: FAMILY MEDICINE

## 2022-08-11 PROCEDURE — 99397 PER PM REEVAL EST PAT 65+ YR: CPT | Mod: S$GLB,,, | Performed by: FAMILY MEDICINE

## 2022-08-11 PROCEDURE — 99999 PR PBB SHADOW E&M-EST. PATIENT-LVL IV: CPT | Mod: PBBFAC,,, | Performed by: FAMILY MEDICINE

## 2022-08-11 PROCEDURE — 99214 OFFICE O/P EST MOD 30 MIN: CPT | Mod: 25,S$GLB,, | Performed by: FAMILY MEDICINE

## 2022-08-11 PROCEDURE — 84466 ASSAY OF TRANSFERRIN: CPT | Performed by: FAMILY MEDICINE

## 2022-08-11 PROCEDURE — 1159F MED LIST DOCD IN RCRD: CPT | Mod: CPTII,S$GLB,, | Performed by: FAMILY MEDICINE

## 2022-08-11 PROCEDURE — 85025 COMPLETE CBC W/AUTO DIFF WBC: CPT | Performed by: FAMILY MEDICINE

## 2022-08-11 RX ORDER — ESCITALOPRAM OXALATE 10 MG/1
10 TABLET ORAL DAILY
Qty: 30 TABLET | Refills: 1 | Status: SHIPPED | OUTPATIENT
Start: 2022-08-11 | End: 2022-08-30 | Stop reason: SINTOL

## 2022-08-11 NOTE — PROGRESS NOTES
"Subjective:      Patient ID: Kailey Stokes is a 72 y.o. female.    Chief Complaint: Annual Exam    HPI 72 y.o.   female patient with a PMHx of anxiety, HLD, HTN, and nerve damage presents to clinic for annual exam. The patient was last seen on February 23, 2022      Today she reports she is feeling well. Patient reports she have been taking probiotics and it is working great.   Patient otherwise without complaints. Denies SOB, chest pain, and bowel changes.      Past Medical History:   Diagnosis Date    Abnormal Pap smear     Anxiety     Dr. Bose    Breast disorder     Pain in left breast    Family history of colonic polyps 10/18/2018    Her brother and sister have both had colon polyps.    Hyperlipidemia     Hypertension     Nerve damage     legs     Family History   Problem Relation Age of Onset    Diabetes Mother     Glaucoma Mother     Hypertension Father     Heart disease Father     Arthritis Father     Diabetes Sister     Glaucoma Sister     Lymphoma Sister     Diabetes Brother     Breast cancer Maternal Aunt     Cancer Sister     COPD Brother     Kidney disease Brother      Past Surgical History:   Procedure Laterality Date    COLONOSCOPY N/A 10/18/2018    Procedure: COLONOSCOPY;  Surgeon: Jayjay Grier MD;  Location: [facility];  Service: Endoscopy;  Laterality: N/A;    HYSTERECTOMY      KNEE SURGERY      TOTAL ABDOMINAL HYSTERECTOMY W/ BILATERAL SALPINGOOPHORECTOMY       Social History     Tobacco Use    Smoking status: Never Smoker    Smokeless tobacco: Never Used   Substance Use Topics    Alcohol use: Not Currently     Comment: occ use    Drug use: No       /70   Pulse 87   Temp 97 °F (36.1 °C)   Ht 5' 9" (1.753 m)   Wt 88.6 kg (195 lb 5.2 oz)   SpO2 97%   BMI 28.84 kg/m²     Review of Systems   Constitutional: Negative for activity change, appetite change, chills, diaphoresis, fatigue, fever and unexpected weight change.   HENT: Negative for congestion, ear " pain, hearing loss, postnasal drip, rhinorrhea, sinus pain and sore throat.    Eyes: Negative for pain, discharge, itching and visual disturbance.   Respiratory: Negative for cough, chest tightness, shortness of breath and wheezing.    Cardiovascular: Negative for chest pain, palpitations and leg swelling.   Gastrointestinal: Negative for abdominal pain, constipation, diarrhea, nausea and vomiting.   Endocrine: Positive for cold intolerance. Negative for polydipsia and polyuria.   Genitourinary: Negative for difficulty urinating, dysuria, flank pain, frequency, menstrual problem, pelvic pain and urgency.   Musculoskeletal: Negative for arthralgias, back pain, joint swelling, myalgias and neck pain.   Skin: Negative for color change and rash.   Neurological: Negative for dizziness, weakness, light-headedness and headaches.   Hematological: Negative for adenopathy.   Psychiatric/Behavioral: Negative for confusion, decreased concentration and dysphoric mood. The patient is nervous/anxious.        Objective:     Physical Exam  Vitals and nursing note reviewed.   Constitutional:       General: She is not in acute distress.  HENT:      Right Ear: External ear normal.      Left Ear: External ear normal.      Nose: Nose normal.   Eyes:      Conjunctiva/sclera: Conjunctivae normal.      Pupils: Pupils are equal, round, and reactive to light.   Neck:      Vascular: No carotid bruit.   Cardiovascular:      Rate and Rhythm: Normal rate and regular rhythm.      Heart sounds: Normal heart sounds.   Pulmonary:      Effort: Pulmonary effort is normal. No respiratory distress.      Breath sounds: Normal breath sounds. No wheezing or rales.   Abdominal:      General: Bowel sounds are normal. There is no distension.      Palpations: Abdomen is soft.      Tenderness: There is no abdominal tenderness. There is no guarding.   Musculoskeletal:      Cervical back: Normal range of motion and neck supple.      Right lower leg: No edema.       Left lower leg: No edema.   Skin:     General: Skin is warm and dry.      Findings: No rash.   Neurological:      Mental Status: She is alert and oriented to person, place, and time.   Psychiatric:         Behavior: Behavior normal.         Thought Content: Thought content normal.         Judgment: Judgment normal.         Lab Results   Component Value Date    WBC 6.69 08/11/2021    HGB 13.2 08/11/2021    HCT 39.9 08/11/2021     08/11/2021    CHOL 193 05/05/2022    TRIG 169 (H) 05/05/2022    HDL 54 05/05/2022    ALT 14 05/05/2022    AST 11 05/05/2022     05/05/2022    K 4.1 05/05/2022     05/05/2022    CREATININE 0.9 05/05/2022    BUN 10 05/05/2022    CO2 24 05/05/2022    TSH 1.160 05/05/2022    INR 1.0 09/23/2019    HGBA1C 5.6 02/23/2022       Assessment:     1. Annual physical exam    2. Hypertension, unspecified type    3. Hyperlipidemia, unspecified hyperlipidemia type    4. Gastroesophageal reflux disease, unspecified whether esophagitis present    5. Abnormal glucose    6. Cold intolerance    7. KRISTINE (generalized anxiety disorder)         Plan:     Annual physical exam    Hypertension, unspecified type  -     CBC Auto Differential; Future; Expected date: 08/11/2022    Hyperlipidemia, unspecified hyperlipidemia type  -     Comprehensive Metabolic Panel; Future; Expected date: 08/11/2022  -     TSH; Future; Expected date: 08/11/2022  -     Lipid Panel; Future; Expected date: 08/11/2022    Gastroesophageal reflux disease, unspecified whether esophagitis present    Abnormal glucose  -     Hemoglobin A1C; Future; Expected date: 08/11/2022    Cold intolerance  -     Ferritin; Future; Expected date: 08/11/2022  -     Iron and TIBC; Future; Expected date: 08/11/2022    KRISTINE (generalized anxiety disorder)    Other orders  -     EScitalopram oxalate (LEXAPRO) 10 MG tablet; Take 1 tablet (10 mg total) by mouth once daily.  Dispense: 30 tablet; Refill: 1        Vitals reviewed. BP within normal range at  120/70.    Will update labs today.  Preventive measures up to date  Cont meds  F/u annually and PRN    Problem visit:  S:  She states sometimes she is too scared to drive.  She is anxious about crossing railroad tracks, driving certain places/over bridges.  Feels anxious about doing things/going out.  Affects her daily at this point.  Is clenching off/on.   Patient reports she is always cold.  O:  Normal exam  A/P:  Chronic anxiety/worsening  Patient is advised she does need to be on something daily to assist with her anxiety. Will start patient on Lexapro 10 mg.  Ok to take the klonopin nightly PRN  Follow-up in 1 month for med check        Documentation entered by Leigh Yeager, acting as scribe for Dr. Karel Durán. 08/11/2022 8:21 AM.

## 2022-08-17 ENCOUNTER — OFFICE VISIT (OUTPATIENT)
Dept: PRIMARY CARE CLINIC | Facility: CLINIC | Age: 72
End: 2022-08-17
Payer: MEDICARE

## 2022-08-17 VITALS
HEIGHT: 69 IN | SYSTOLIC BLOOD PRESSURE: 108 MMHG | WEIGHT: 190.94 LBS | BODY MASS INDEX: 28.28 KG/M2 | HEART RATE: 88 BPM | TEMPERATURE: 98 F | DIASTOLIC BLOOD PRESSURE: 68 MMHG

## 2022-08-17 DIAGNOSIS — R42 DIZZINESS: Primary | ICD-10-CM

## 2022-08-17 DIAGNOSIS — N18.32 STAGE 3B CHRONIC KIDNEY DISEASE: ICD-10-CM

## 2022-08-17 DIAGNOSIS — R53.1 WEAKNESS: ICD-10-CM

## 2022-08-17 DIAGNOSIS — R42 LIGHTHEADEDNESS: ICD-10-CM

## 2022-08-17 DIAGNOSIS — K59.00 CONSTIPATION, UNSPECIFIED CONSTIPATION TYPE: ICD-10-CM

## 2022-08-17 PROCEDURE — 99499 UNLISTED E&M SERVICE: CPT | Mod: S$GLB,,, | Performed by: FAMILY MEDICINE

## 2022-08-17 PROCEDURE — 93005 ELECTROCARDIOGRAM TRACING: CPT | Mod: S$GLB,,, | Performed by: FAMILY MEDICINE

## 2022-08-17 PROCEDURE — 3288F PR FALLS RISK ASSESSMENT DOCUMENTED: ICD-10-PCS | Mod: CPTII,S$GLB,, | Performed by: FAMILY MEDICINE

## 2022-08-17 PROCEDURE — 1101F PR PT FALLS ASSESS DOC 0-1 FALLS W/OUT INJ PAST YR: ICD-10-PCS | Mod: CPTII,S$GLB,, | Performed by: FAMILY MEDICINE

## 2022-08-17 PROCEDURE — 99999 PR PBB SHADOW E&M-EST. PATIENT-LVL V: ICD-10-PCS | Mod: PBBFAC,,, | Performed by: FAMILY MEDICINE

## 2022-08-17 PROCEDURE — 3078F PR MOST RECENT DIASTOLIC BLOOD PRESSURE < 80 MM HG: ICD-10-PCS | Mod: CPTII,S$GLB,, | Performed by: FAMILY MEDICINE

## 2022-08-17 PROCEDURE — 1101F PT FALLS ASSESS-DOCD LE1/YR: CPT | Mod: CPTII,S$GLB,, | Performed by: FAMILY MEDICINE

## 2022-08-17 PROCEDURE — 3066F PR DOCUMENTATION OF TREATMENT FOR NEPHROPATHY: ICD-10-PCS | Mod: CPTII,S$GLB,, | Performed by: FAMILY MEDICINE

## 2022-08-17 PROCEDURE — 1159F MED LIST DOCD IN RCRD: CPT | Mod: CPTII,S$GLB,, | Performed by: FAMILY MEDICINE

## 2022-08-17 PROCEDURE — 3074F SYST BP LT 130 MM HG: CPT | Mod: CPTII,S$GLB,, | Performed by: FAMILY MEDICINE

## 2022-08-17 PROCEDURE — 99499 RISK ADDL DX/OHS AUDIT: ICD-10-PCS | Mod: S$GLB,,, | Performed by: FAMILY MEDICINE

## 2022-08-17 PROCEDURE — 99215 OFFICE O/P EST HI 40 MIN: CPT | Mod: S$GLB,,, | Performed by: FAMILY MEDICINE

## 2022-08-17 PROCEDURE — 3008F BODY MASS INDEX DOCD: CPT | Mod: CPTII,S$GLB,, | Performed by: FAMILY MEDICINE

## 2022-08-17 PROCEDURE — 3078F DIAST BP <80 MM HG: CPT | Mod: CPTII,S$GLB,, | Performed by: FAMILY MEDICINE

## 2022-08-17 PROCEDURE — 3074F PR MOST RECENT SYSTOLIC BLOOD PRESSURE < 130 MM HG: ICD-10-PCS | Mod: CPTII,S$GLB,, | Performed by: FAMILY MEDICINE

## 2022-08-17 PROCEDURE — 1160F PR REVIEW ALL MEDS BY PRESCRIBER/CLIN PHARMACIST DOCUMENTED: ICD-10-PCS | Mod: CPTII,S$GLB,, | Performed by: FAMILY MEDICINE

## 2022-08-17 PROCEDURE — 3044F HG A1C LEVEL LT 7.0%: CPT | Mod: CPTII,S$GLB,, | Performed by: FAMILY MEDICINE

## 2022-08-17 PROCEDURE — 1160F RVW MEDS BY RX/DR IN RCRD: CPT | Mod: CPTII,S$GLB,, | Performed by: FAMILY MEDICINE

## 2022-08-17 PROCEDURE — 93010 ELECTROCARDIOGRAM REPORT: CPT | Mod: S$GLB,,, | Performed by: INTERNAL MEDICINE

## 2022-08-17 PROCEDURE — 3066F NEPHROPATHY DOC TX: CPT | Mod: CPTII,S$GLB,, | Performed by: FAMILY MEDICINE

## 2022-08-17 PROCEDURE — 93010 EKG 12-LEAD: ICD-10-PCS | Mod: S$GLB,,, | Performed by: INTERNAL MEDICINE

## 2022-08-17 PROCEDURE — 1159F PR MEDICATION LIST DOCUMENTED IN MEDICAL RECORD: ICD-10-PCS | Mod: CPTII,S$GLB,, | Performed by: FAMILY MEDICINE

## 2022-08-17 PROCEDURE — 99215 PR OFFICE/OUTPT VISIT, EST, LEVL V, 40-54 MIN: ICD-10-PCS | Mod: S$GLB,,, | Performed by: FAMILY MEDICINE

## 2022-08-17 PROCEDURE — 3008F PR BODY MASS INDEX (BMI) DOCUMENTED: ICD-10-PCS | Mod: CPTII,S$GLB,, | Performed by: FAMILY MEDICINE

## 2022-08-17 PROCEDURE — 3061F PR NEG MICROALBUMINURIA RESULT DOCUMENTED/REVIEW: ICD-10-PCS | Mod: CPTII,S$GLB,, | Performed by: FAMILY MEDICINE

## 2022-08-17 PROCEDURE — 99999 PR PBB SHADOW E&M-EST. PATIENT-LVL V: CPT | Mod: PBBFAC,,, | Performed by: FAMILY MEDICINE

## 2022-08-17 PROCEDURE — 3288F FALL RISK ASSESSMENT DOCD: CPT | Mod: CPTII,S$GLB,, | Performed by: FAMILY MEDICINE

## 2022-08-17 PROCEDURE — 3044F PR MOST RECENT HEMOGLOBIN A1C LEVEL <7.0%: ICD-10-PCS | Mod: CPTII,S$GLB,, | Performed by: FAMILY MEDICINE

## 2022-08-17 PROCEDURE — 93005 EKG 12-LEAD: ICD-10-PCS | Mod: S$GLB,,, | Performed by: FAMILY MEDICINE

## 2022-08-17 PROCEDURE — 3061F NEG MICROALBUMINURIA REV: CPT | Mod: CPTII,S$GLB,, | Performed by: FAMILY MEDICINE

## 2022-08-17 NOTE — PROGRESS NOTES
"    [facility]       [address] [place] [address]      Phone: [phone]      Fax: [phone]    Babak Skinner MD                Office Visit  08/17/2022        Subjective      HPI:  Kailey Stokes is a 72 y.o. female presents today in clinic for "Dizziness and Fatigue (X1 week)  ."     72-year-old female presents today complaining of lightheadedness, dizziness.      Her sister, Ms. Stanford, is present with her.  She provides portions of the history.      Patient states that since the weekend, about 3-4 days ago, she has been feeling dizzy, lightheaded.  Feels weak.  Freezing cold (although this has been going on for a while).  She also reports feeling nauseous one day, but no vomiting.  The nausea has resolved.  She denies any chest pains, but reports some mild shortness of breath.  Decreased appetite, has been eating less.  States she has lost about 5 lb since last week.  No urinary issues.  Denies any fever, coughing, congestion.  Sneezes occasionally, but this is normal for her.    Ongoing issues with constipation.  Last bowel movement was two days ago.  States she cannot have a bowel movement unless uses a suppository.  She takes probiotics daily.  Eats lots of fiber.  Also takes MiraLax daily.  In the past, has also tried Mag citrate.  Tries to keep hydrated, drinks an average of four, 16 oz, bottles of water each day.  Regardless of method, stool always tends to be hard, clumpy, pebble-like.    States that she did start taking Lexapro after her visit with PCP last week.        The following were updated and reviewed by myself in the chart: medications, past medical history, past surgical history, family history, social history, and allergies.     Medications:  Current Outpatient Medications on File Prior to Visit   Medication Sig Dispense Refill    amLODIPine (NORVASC) 2.5 MG tablet TAKE 1 TABLET(2.5 MG) BY MOUTH EVERY DAY 30 tablet 11    ascorbic " acid, vitamin C, (VITAMIN C) 1000 MG tablet Take 1,000 mg by mouth.      aspirin (ECOTRIN) 81 MG EC tablet Take 81 mg by mouth once daily.      cholecalciferol, vitamin D3, 125 mcg (5,000 unit) capsule Take 5,000 Units by mouth.      clonazePAM (KLONOPIN) 0.5 MG tablet TAKE 1 TABLET(0.5 MG) BY MOUTH DAILY AS NEEDED FOR ANXIETY 30 tablet 1    cranberry fruit extract (CRANBERRY EXTRACT ORAL) Take 1 tablet by mouth once daily.      ergocalciferol, vitamin D2, (VITAMIN D ORAL) Take 1 tablet by mouth once daily.      EScitalopram oxalate (LEXAPRO) 10 MG tablet Take 1 tablet (10 mg total) by mouth once daily. 30 tablet 1    estradioL (ESTRACE) 0.5 MG tablet Take 1 tablet (0.5 mg total) by mouth every other day. 14 tablet 11    estradiol 0.05 mg/24 hr td ptsw (VIVELLE-DOT) 0.05 mg/24 hr Place 1 patch onto the skin twice a week. 8 patch 1    fluticasone propionate (FLONASE) 50 mcg/actuation nasal spray SHAKE LIQUID AND USE 2 SPRAYS(100 MCG) IN EACH NOSTRIL EVERY DAY 16 g 11    icosapent ethyL (VASCEPA) 1 gram Cap Take 2 capsules (2 g total) by mouth once daily. 60 capsule 0    L gasseri/B bifidum/B longum ([facility] HEALTH ORAL) Take by mouth.      mometasone (ELOCON) 0.1 % solution Apply to scalp once daily 60 mL 3    pantoprazole (PROTONIX) 40 MG tablet TAKE 1 TABLET(40 MG) BY MOUTH EVERY DAY 90 tablet 3    rosuvastatin (CRESTOR) 40 MG Tab Take 1 tablet (40 mg total) by mouth every evening. 90 tablet 3    thiamine HCl (VITAMIN B-1) 500 MG tablet Take 500 mg by mouth once daily.      fluocinolone (DERMA-SMOOTHE) 0.01 % external oil Apply oil to scalp once a day 1 Bottle 5     No current facility-administered medications on file prior to visit.        PMHx:  Past Medical History:   Diagnosis Date    Abnormal Pap smear     Anxiety     Dr. Bose    Breast disorder     Pain in left breast    Family history of colonic polyps 10/18/2018    Her brother and sister have both had colon polyps.     Hyperlipidemia     Hypertension     Nerve damage     legs      Patient Active Problem List    Diagnosis Date Noted    Gastroesophageal reflux disease 06/27/2022    Thiamine deficiency 03/09/2021    Aortic calcification 03/09/2021    Colon cancer screening 10/18/2018    Special screening for malignant neoplasms, colon 10/18/2018    Family history of colonic polyps 10/18/2018    Colon polyp 10/18/2018    Headache 06/24/2015    Hypertension 08/15/2013    Hyperlipidemia 08/15/2013    Symptomatic states associated with artificial menopause 09/22/2010    Cysts of eyelids 02/17/2010    Disorder of eye, unspecified 02/09/2010        PSHx:  Past Surgical History:   Procedure Laterality Date    COLONOSCOPY N/A 10/18/2018    Procedure: COLONOSCOPY;  Surgeon: Jayjay Grier MD;  Location: [facility];  Service: Endoscopy;  Laterality: N/A;    HYSTERECTOMY      KNEE SURGERY      TOTAL ABDOMINAL HYSTERECTOMY W/ BILATERAL SALPINGOOPHORECTOMY          FHx:  Family History   Problem Relation Age of Onset    Diabetes Mother     Glaucoma Mother     Hypertension Father     Heart disease Father     Arthritis Father     Diabetes Sister     Glaucoma Sister     Lymphoma Sister     Diabetes Brother     Breast cancer Maternal Aunt     Cancer Sister     COPD Brother     Kidney disease Brother         Social:  Social History     Socioeconomic History    Marital status:     Number of children: 1   Occupational History    Occupation: Retired   Tobacco Use    Smoking status: Never Smoker    Smokeless tobacco: Never Used   Substance and Sexual Activity    Alcohol use: Not Currently     Comment: occ use    Drug use: No    Sexual activity: Not Currently     Partners: Male     Birth control/protection: See Surgical Hx   Other Topics Concern    Are you pregnant or think you may be? No    Breast-feeding No        Allergies:  Review of patient's allergies indicates:   Allergen Reactions    Bromocriptine   "    Other reaction(s): nightmares  Other reaction(s): anixety    Codeine      Other reaction(s): Headache    Morphine Rash        ROS:  Review of Systems   Constitutional: Positive for activity change, appetite change, chills, fatigue and unexpected weight change. Negative for fever.   HENT: Negative for congestion, postnasal drip, rhinorrhea, sore throat and trouble swallowing.    Respiratory: Negative for cough, shortness of breath and wheezing.    Cardiovascular: Negative for chest pain and palpitations.   Gastrointestinal: Positive for constipation. Negative for abdominal pain, diarrhea, nausea and vomiting.   Genitourinary: Negative for difficulty urinating.   Musculoskeletal: Negative for arthralgias and myalgias.   Skin: Negative for color change and rash.   Neurological: Positive for dizziness and light-headedness. Negative for speech difficulty and headaches.   All other systems reviewed and are negative.         Objective      /68   Pulse 88   Temp 98.2 °F (36.8 °C)   Ht 5' 9" (1.753 m)   Wt 86.6 kg (190 lb 14.7 oz)   BMI 28.19 kg/m²   Ht Readings from Last 3 Encounters:   08/17/22 5' 9" (1.753 m)   08/11/22 5' 9" (1.753 m)   06/27/22 5' 9" (1.753 m)     Wt Readings from Last 3 Encounters:   08/17/22 86.6 kg (190 lb 14.7 oz)   08/11/22 88.6 kg (195 lb 5.2 oz)   06/27/22 88.2 kg (194 lb 7.1 oz)       PHYSICAL EXAM:  Physical Exam  Vitals and nursing note reviewed.   Constitutional:       General: She is not in acute distress.     Appearance: Normal appearance.   HENT:      Head: Normocephalic and atraumatic.      Right Ear: Tympanic membrane, ear canal and external ear normal.      Left Ear: Tympanic membrane, ear canal and external ear normal.      Nose: Nose normal. No congestion or rhinorrhea.      Mouth/Throat:      Mouth: Mucous membranes are moist.      Pharynx: Oropharynx is clear. No oropharyngeal exudate or posterior oropharyngeal erythema.   Eyes:      Extraocular Movements: " Extraocular movements intact.      Conjunctiva/sclera: Conjunctivae normal.      Pupils: Pupils are equal, round, and reactive to light.   Cardiovascular:      Rate and Rhythm: Normal rate and regular rhythm.   Pulmonary:      Effort: Pulmonary effort is normal. No respiratory distress.      Breath sounds: No wheezing, rhonchi or rales.   Musculoskeletal:         General: Normal range of motion.      Cervical back: Normal range of motion.   Lymphadenopathy:      Cervical: No cervical adenopathy.   Skin:     General: Skin is warm and dry.      Findings: No rash.   Neurological:      Mental Status: She is alert.              LABS / IMAGING:  Recent Results (from the past 4368 hour(s))   Basic Metabolic Panel    Collection Time: 02/23/22 10:20 AM   Result Value Ref Range    Sodium 142 136 - 145 mmol/L    Potassium 3.8 3.5 - 5.1 mmol/L    Chloride 105 95 - 110 mmol/L    CO2 27 23 - 29 mmol/L    Glucose 81 70 - 110 mg/dL    BUN 12 8 - 23 mg/dL    Creatinine 1.0 0.5 - 1.4 mg/dL    Calcium 9.7 8.7 - 10.5 mg/dL    Anion Gap 10 8 - 16 mmol/L    eGFR if African American >60.0 >60 mL/min/1.73 m^2    eGFR if non  57.6 (A) >60 mL/min/1.73 m^2   Hemoglobin A1C    Collection Time: 02/23/22 10:20 AM   Result Value Ref Range    Hemoglobin A1C 5.6 4.0 - 5.6 %    Estimated Avg Glucose 114 68 - 131 mg/dL   Microalbumin/Creatinine Ratio, Urine    Collection Time: 02/23/22 10:43 AM   Result Value Ref Range    Microalbumin, Urine 23.0 ug/mL    Creatinine, Urine 240.0 15.0 - 325.0 mg/dL    Microalb/Creat Ratio 9.6 0.0 - 30.0 ug/mg   TSH    Collection Time: 05/05/22 10:29 AM   Result Value Ref Range    TSH 1.160 0.400 - 4.000 uIU/mL   Lipid Panel    Collection Time: 05/05/22 10:29 AM   Result Value Ref Range    Cholesterol 193 120 - 199 mg/dL    Triglycerides 169 (H) 30 - 150 mg/dL    HDL 54 40 - 75 mg/dL    LDL Cholesterol 105.2 63.0 - 159.0 mg/dL    HDL/Cholesterol Ratio 28.0 20.0 - 50.0 %    Total Cholesterol/HDL Ratio  3.6 2.0 - 5.0    Non-HDL Cholesterol 139 mg/dL   Comprehensive Metabolic Panel    Collection Time: 05/05/22 10:29 AM   Result Value Ref Range    Sodium 141 136 - 145 mmol/L    Potassium 4.1 3.5 - 5.1 mmol/L    Chloride 106 95 - 110 mmol/L    CO2 24 23 - 29 mmol/L    Glucose 89 70 - 110 mg/dL    BUN 10 8 - 23 mg/dL    Creatinine 0.9 0.5 - 1.4 mg/dL    Calcium 9.5 8.7 - 10.5 mg/dL    Total Protein 7.3 6.0 - 8.4 g/dL    Albumin 4.3 3.5 - 5.2 g/dL    Total Bilirubin 0.6 0.1 - 1.0 mg/dL    Alkaline Phosphatase 92 55 - 135 U/L    AST 11 10 - 40 U/L    ALT 14 10 - 44 U/L    Anion Gap 11 8 - 16 mmol/L    eGFR if African American >60.0 >60 mL/min/1.73 m^2    eGFR if non African American >60.0 >60 mL/min/1.73 m^2   POCT Influenza A/B Molecular    Collection Time: 06/06/22  9:21 AM   Result Value Ref Range    POC Molecular Influenza A Ag Negative Negative, Not Reported    POC Molecular Influenza B Ag Negative Negative, Not Reported     Acceptable Yes    POCT COVID-19 Rapid Screening    Collection Time: 06/06/22  9:22 AM   Result Value Ref Range    POC Rapid COVID Negative Negative     Acceptable Yes    CBC Auto Differential    Collection Time: 08/11/22  8:58 AM   Result Value Ref Range    WBC 5.85 3.90 - 12.70 K/uL    RBC 4.81 4.00 - 5.40 M/uL    Hemoglobin 13.9 12.0 - 16.0 g/dL    Hematocrit 41.1 37.0 - 48.5 %    MCV 85 82 - 98 fL    MCH 28.9 27.0 - 31.0 pg    MCHC 33.8 32.0 - 36.0 g/dL    RDW 13.7 11.5 - 14.5 %    Platelets 249 150 - 450 K/uL    MPV 10.1 9.2 - 12.9 fL    Immature Granulocytes 0.2 0.0 - 0.5 %    Gran # (ANC) 3.0 1.8 - 7.7 K/uL    Immature Grans (Abs) 0.01 0.00 - 0.04 K/uL    Lymph # 2.2 1.0 - 4.8 K/uL    Mono # 0.5 0.3 - 1.0 K/uL    Eos # 0.1 0.0 - 0.5 K/uL    Baso # 0.06 0.00 - 0.20 K/uL    nRBC 0 0 /100 WBC    Gran % 51.0 38.0 - 73.0 %    Lymph % 37.1 18.0 - 48.0 %    Mono % 8.5 4.0 - 15.0 %    Eosinophil % 2.2 0.0 - 8.0 %    Basophil % 1.0 0.0 - 1.9 %    Differential Method  Automated    Comprehensive Metabolic Panel    Collection Time: 08/11/22  8:58 AM   Result Value Ref Range    Sodium 142 136 - 145 mmol/L    Potassium 4.1 3.5 - 5.1 mmol/L    Chloride 105 95 - 110 mmol/L    CO2 28 23 - 29 mmol/L    Glucose 95 70 - 110 mg/dL    BUN 11 8 - 23 mg/dL    Creatinine 1.3 0.5 - 1.4 mg/dL    Calcium 9.8 8.7 - 10.5 mg/dL    Total Protein 7.0 6.0 - 8.4 g/dL    Albumin 4.3 3.5 - 5.2 g/dL    Total Bilirubin 0.7 0.1 - 1.0 mg/dL    Alkaline Phosphatase 86 55 - 135 U/L    AST 37 10 - 40 U/L    ALT 41 10 - 44 U/L    Anion Gap 9 8 - 16 mmol/L    eGFR 44.2 (A) >60 mL/min/1.73 m^2   Hemoglobin A1C    Collection Time: 08/11/22  8:58 AM   Result Value Ref Range    Hemoglobin A1C 5.7 (H) 4.0 - 5.6 %    Estimated Avg Glucose 117 68 - 131 mg/dL   TSH    Collection Time: 08/11/22  8:58 AM   Result Value Ref Range    TSH 2.193 0.400 - 4.000 uIU/mL   Lipid Panel    Collection Time: 08/11/22  8:58 AM   Result Value Ref Range    Cholesterol 178 120 - 199 mg/dL    Triglycerides 167 (H) 30 - 150 mg/dL    HDL 53 40 - 75 mg/dL    LDL Cholesterol 91.6 63.0 - 159.0 mg/dL    HDL/Cholesterol Ratio 29.8 20.0 - 50.0 %    Total Cholesterol/HDL Ratio 3.4 2.0 - 5.0    Non-HDL Cholesterol 125 mg/dL   Ferritin    Collection Time: 08/11/22  8:58 AM   Result Value Ref Range    Ferritin 200 20.0 - 300.0 ng/mL   Iron and TIBC    Collection Time: 08/11/22  8:58 AM   Result Value Ref Range    Iron 73 30 - 160 ug/dL    Transferrin 215 200 - 375 mg/dL    TIBC 318 250 - 450 ug/dL    Saturated Iron 23 20 - 50 %         Assessment    1. Dizziness    2. Lightheadedness    3. Weakness    4. Stage 3b chronic kidney disease    5. Constipation, unspecified constipation type          Kamar Bonner was seen today for dizziness and fatigue.    Diagnoses and all orders for this visit:    Dizziness  -     IN OFFICE EKG 12-LEAD (to Muse)    Lightheadedness  -     IN OFFICE EKG 12-LEAD (to Muse)    Weakness  -     IN OFFICE EKG 12-LEAD (to  Muse)    Stage 3b chronic kidney disease  -     Ambulatory referral/consult to Nephrology; Future    Constipation, unspecified constipation type  -     Ambulatory referral/consult to Colorectal Surgery; Future    Physically, she appears okay.      EKG done in clinic shows normal sinus rhythm    Symptoms that she describes seem consistent with side effects from starting Lexapro.  Seems to be getting better, encouraged her to continue the medication, follow-up with PCP, as needed.  She has an appointment scheduled in five weeks for medicine check.      Regarding her constipation, discussed increasing fluids, fiber.  She has tried several things in the past.  Gets best relief manual stimulation, small enema.  Discussed referral to Colorectal for examination, possible anoscope just to make sure that the musculature all works okay.  She is almost due for screening colonoscopy anyway, so we may be able to accomplish both things at the same time.      Reviewed recent lab work with patient.  Biggest concern is CKD stage IIIB.  She states several family members have kidney issues, ESRD/HD.  She would like to get established with Nephrology sooner, rather than later, so will place [facility] today.    FOLLOW-UP:  Follow up if symptoms worsen or fail to improve.    I spent a total of 45 minutes face to face and non-face to face on the date of this visit.This includes time preparing to see the patient (eg, review of tests, notes), obtaining and/or reviewing additional history from an independent historian and/or outside medical records, documenting clinical information in the electronic health record, independently interpreting results and/or communicating results to the patient/family/caregiver, or care coordinator.    Signed by:  Babak Skinner MD

## 2022-08-17 NOTE — PATIENT INSTRUCTIONS
Physically, everything looks good today.      EKG was normal.  No signs of any cardiac issues.      I really suspect that the symptoms you are feeling are related to starting the Lexapro over the weekend.  Everything you are feeling is a normal reaction to that medicine.  The good news is that over time, the symptoms do tend to resolve on their own.  Continue taking it, once daily, as directed.    Regarding your constipation issues, try using a foot stool or squatty potty to change the angle of your legs while on the toilet.  Often, this can make it easier to have a bowel movement.      Also, be sure you are drinking lots of water every day.  At a minimum, try to drink 4-5 16 oz glasses or bottles of water each day.  Keep using your probiotic.  Be sure to increase the fiber in your diet as well.    If you like, let us also have you see our colorectal specialist just to make sure that all the musculature is working correctly.  Referral placed for Dr. Frantz Abdalla at [facility] in .      Additionally, home your recent blood work looks fine.  The only concern is your kidney function.  Since it runs in her family, let us have you see a nephrologist (kidney specialist) sooner, rather than later.  [facility] for [facility].  They have a [place] location that has just reopened.  Feel free to call them at [phone] to schedule an appointment.

## 2022-08-22 ENCOUNTER — PATIENT MESSAGE (OUTPATIENT)
Dept: PRIMARY CARE CLINIC | Facility: CLINIC | Age: 72
End: 2022-08-22
Payer: MEDICARE

## 2022-08-24 ENCOUNTER — PATIENT MESSAGE (OUTPATIENT)
Dept: INTERNAL MEDICINE | Facility: CLINIC | Age: 72
End: 2022-08-24
Payer: MEDICARE

## 2022-08-30 ENCOUNTER — OFFICE VISIT (OUTPATIENT)
Dept: INTERNAL MEDICINE | Facility: CLINIC | Age: 72
End: 2022-08-30
Payer: MEDICARE

## 2022-08-30 DIAGNOSIS — I10 HYPERTENSION, UNSPECIFIED TYPE: ICD-10-CM

## 2022-08-30 DIAGNOSIS — F41.1 GAD (GENERALIZED ANXIETY DISORDER): Primary | ICD-10-CM

## 2022-08-30 DIAGNOSIS — N18.32 CHRONIC KIDNEY DISEASE, STAGE 3B: ICD-10-CM

## 2022-08-30 DIAGNOSIS — K59.09 CHRONIC CONSTIPATION: ICD-10-CM

## 2022-08-30 PROCEDURE — 99214 PR OFFICE/OUTPT VISIT, EST, LEVL IV, 30-39 MIN: ICD-10-PCS | Mod: 95,,, | Performed by: NURSE PRACTITIONER

## 2022-08-30 PROCEDURE — 1160F PR REVIEW ALL MEDS BY PRESCRIBER/CLIN PHARMACIST DOCUMENTED: ICD-10-PCS | Mod: CPTII,95,, | Performed by: NURSE PRACTITIONER

## 2022-08-30 PROCEDURE — 3061F PR NEG MICROALBUMINURIA RESULT DOCUMENTED/REVIEW: ICD-10-PCS | Mod: CPTII,95,, | Performed by: NURSE PRACTITIONER

## 2022-08-30 PROCEDURE — 99214 OFFICE O/P EST MOD 30 MIN: CPT | Mod: 95,,, | Performed by: NURSE PRACTITIONER

## 2022-08-30 PROCEDURE — 1160F RVW MEDS BY RX/DR IN RCRD: CPT | Mod: CPTII,95,, | Performed by: NURSE PRACTITIONER

## 2022-08-30 PROCEDURE — 3061F NEG MICROALBUMINURIA REV: CPT | Mod: CPTII,95,, | Performed by: NURSE PRACTITIONER

## 2022-08-30 PROCEDURE — 3066F NEPHROPATHY DOC TX: CPT | Mod: CPTII,95,, | Performed by: NURSE PRACTITIONER

## 2022-08-30 PROCEDURE — 1159F MED LIST DOCD IN RCRD: CPT | Mod: CPTII,95,, | Performed by: NURSE PRACTITIONER

## 2022-08-30 PROCEDURE — 3044F PR MOST RECENT HEMOGLOBIN A1C LEVEL <7.0%: ICD-10-PCS | Mod: CPTII,95,, | Performed by: NURSE PRACTITIONER

## 2022-08-30 PROCEDURE — 3044F HG A1C LEVEL LT 7.0%: CPT | Mod: CPTII,95,, | Performed by: NURSE PRACTITIONER

## 2022-08-30 PROCEDURE — 1159F PR MEDICATION LIST DOCUMENTED IN MEDICAL RECORD: ICD-10-PCS | Mod: CPTII,95,, | Performed by: NURSE PRACTITIONER

## 2022-08-30 PROCEDURE — 3066F PR DOCUMENTATION OF TREATMENT FOR NEPHROPATHY: ICD-10-PCS | Mod: CPTII,95,, | Performed by: NURSE PRACTITIONER

## 2022-08-30 NOTE — PROGRESS NOTES
Subjective:       Patient ID: Kailey Stokes is a 72 y.o. female.    Chief Complaint: Follow-up    The patient location is: home  The chief complaint leading to consultation is: follow up    Visit type: audiovisual    Face to Face time with patient: 15 min  20 minutes of total time spent on the encounter, which includes face to face time and non-face to face time preparing to see the patient (eg, review of tests), Obtaining and/or reviewing separately obtained history, Documenting clinical information in the electronic or other health record, Independently interpreting results (not separately reported) and communicating results to the patient/family/caregiver, or Care coordination (not separately reported).         Each patient to whom he or she provides medical services by telemedicine is:  (1) informed of the relationship between the physician and patient and the respective role of any other health care provider with respect to management of the patient; and (2) notified that he or she may decline to receive medical services by telemedicine and may withdraw from such care at any time.    Notes:   Patient doing virtual visit for constipation  She feels nothing helps her have a bm unless she uses a enema  She has a visit with gi tomorrow at 11 to further discuss    DrSasha ROWE started patient on lexapro for anxiety at her annual 8/11. She said she got dizzy, nausea and passed out. She has since stopped lexapro and symptoms have resolved. She wants to try to manage her anxiety with self coping skills for now and hold off on additional meds    Dr. Skinner discussed her kidneys with patient and told her she had ckd which she was unaware of. She was referred to nephro. That consult is tomorrow      Follow-up  Associated symptoms include weakness. Pertinent negatives include no arthralgias, chest pain, headaches, joint swelling, neck pain or vomiting.     There were no vitals taken for this visit.    Review of Systems    Constitutional:  Negative for activity change and unexpected weight change.   HENT:  Negative for hearing loss, rhinorrhea and trouble swallowing.    Eyes:  Negative for discharge and visual disturbance.   Respiratory:  Negative for chest tightness and wheezing.    Cardiovascular:  Negative for chest pain and palpitations.   Gastrointestinal:  Positive for constipation. Negative for blood in stool, diarrhea and vomiting.   Endocrine: Negative for polydipsia and polyuria.   Genitourinary:  Negative for difficulty urinating, dysuria, hematuria and menstrual problem.   Musculoskeletal:  Negative for arthralgias, joint swelling and neck pain.   Neurological:  Positive for weakness. Negative for headaches.   Psychiatric/Behavioral:  Negative for confusion and dysphoric mood.      Objective:      Physical Exam  Constitutional:       General: She is not in acute distress.     Appearance: She is well-developed. She is not diaphoretic.   HENT:      Head: Normocephalic and atraumatic.      Right Ear: External ear normal.      Left Ear: External ear normal.   Eyes:      General: No scleral icterus.        Right eye: No discharge.         Left eye: No discharge.      Conjunctiva/sclera: Conjunctivae normal.   Pulmonary:      Effort: Pulmonary effort is normal. No tachypnea, accessory muscle usage or respiratory distress.      Breath sounds: No stridor.   Musculoskeletal:      Cervical back: Normal range of motion and neck supple.   Skin:     Findings: No rash.   Neurological:      Mental Status: She is alert. She is not disoriented.   Psychiatric:         Attention and Perception: She is attentive.         Mood and Affect: Mood is not anxious or depressed. Affect is not labile, blunt, angry or inappropriate.         Speech: Speech normal.         Behavior: Behavior normal.         Thought Content: Thought content normal.         Judgment: Judgment normal.       Assessment:       1. KRISTINE (generalized anxiety disorder)    2.  Hypertension, unspecified type    3. Chronic kidney disease, stage 3b    4. Chronic constipation        Plan:       Kailey was seen today for follow-up.    Diagnoses and all orders for this visit:    KRISTINE (generalized anxiety disorder)  Doing well off lexapro  Discussed self coping skills    Hypertension, unspecified type  Stable and controlled on amlodipine    Chronic kidney disease, stage 3b  Stable. Avoid nsaids, control bp, increase water    Chronic constipation  Not controlled, keep gastro follow up

## 2022-09-17 RX ORDER — ESCITALOPRAM OXALATE 10 MG/1
TABLET ORAL
Qty: 90 TABLET | OUTPATIENT
Start: 2022-09-17

## 2022-09-17 NOTE — TELEPHONE ENCOUNTER
No new care gaps identified.  [facility] Embedded Care Gaps. Reference number: [id number]. 9/17/2022   11:54:57 AM JONATHONT

## 2022-09-18 NOTE — TELEPHONE ENCOUNTER
Refill Decision Note   Kailey Stokes  is requesting a refill authorization.  Brief Assessment and Rationale for Refill:  Quick Discontinue     Medication Therapy Plan:  discontinued on 8/30/2022 for the following reason: Side effects.    Medication Reconciliation Completed: No   Comments:     No Care Gaps recommended.     Note composed:7:05 PM 09/17/2022

## 2022-09-19 ENCOUNTER — PATIENT MESSAGE (OUTPATIENT)
Dept: INTERNAL MEDICINE | Facility: CLINIC | Age: 72
End: 2022-09-19
Payer: MEDICARE

## 2022-09-21 ENCOUNTER — PATIENT MESSAGE (OUTPATIENT)
Dept: INTERNAL MEDICINE | Facility: CLINIC | Age: 72
End: 2022-09-21
Payer: MEDICARE

## 2022-09-21 DIAGNOSIS — E78.5 HYPERLIPIDEMIA, UNSPECIFIED HYPERLIPIDEMIA TYPE: ICD-10-CM

## 2022-09-21 RX ORDER — ROSUVASTATIN CALCIUM 40 MG/1
40 TABLET, COATED ORAL NIGHTLY
Qty: 90 TABLET | Refills: 3
Start: 2022-09-21 | End: 2023-08-30 | Stop reason: SDUPTHER

## 2022-09-21 NOTE — TELEPHONE ENCOUNTER
No new care gaps identified.  [facility] Embedded Care Gaps. Reference number: [id number]. 9/21/2022   10:01:28 AM JONATHONT

## 2022-10-24 ENCOUNTER — TELEPHONE (OUTPATIENT)
Dept: ADMINISTRATIVE | Facility: HOSPITAL | Age: 72
End: 2022-10-24
Payer: MEDICARE

## 2022-10-31 ENCOUNTER — PATIENT MESSAGE (OUTPATIENT)
Dept: ADMINISTRATIVE | Facility: CLINIC | Age: 72
End: 2022-10-31
Payer: MEDICARE

## 2022-10-31 ENCOUNTER — TELEPHONE (OUTPATIENT)
Dept: ADMINISTRATIVE | Facility: CLINIC | Age: 72
End: 2022-10-31
Payer: MEDICARE

## 2022-10-31 NOTE — TELEPHONE ENCOUNTER
Called pt; informed pt I was calling to confirm her virtual EAWV on 11/2/22 at 8:00am and to see if she needed any help; pt stated she did not need any help and would complete e-pre check later today; pt informed to login 15 minutes prior to appt time; sent message through portal

## 2022-11-02 ENCOUNTER — OFFICE VISIT (OUTPATIENT)
Dept: HOME HEALTH SERVICES | Facility: CLINIC | Age: 72
End: 2022-11-02
Payer: MEDICARE

## 2022-11-02 ENCOUNTER — TELEPHONE (OUTPATIENT)
Dept: ADMINISTRATIVE | Facility: CLINIC | Age: 72
End: 2022-11-02
Payer: MEDICARE

## 2022-11-02 DIAGNOSIS — I10 HYPERTENSION, UNSPECIFIED TYPE: ICD-10-CM

## 2022-11-02 DIAGNOSIS — N18.32 STAGE 3B CHRONIC KIDNEY DISEASE: ICD-10-CM

## 2022-11-02 DIAGNOSIS — E78.5 HYPERLIPIDEMIA, UNSPECIFIED HYPERLIPIDEMIA TYPE: ICD-10-CM

## 2022-11-02 DIAGNOSIS — Z00.00 ENCOUNTER FOR PREVENTIVE HEALTH EXAMINATION: Primary | ICD-10-CM

## 2022-11-02 DIAGNOSIS — I70.0 AORTIC CALCIFICATION: ICD-10-CM

## 2022-11-02 PROCEDURE — G0439 PR MEDICARE ANNUAL WELLNESS SUBSEQUENT VISIT: ICD-10-PCS | Mod: 95,,, | Performed by: NURSE PRACTITIONER

## 2022-11-02 PROCEDURE — 3288F FALL RISK ASSESSMENT DOCD: CPT | Mod: CPTII,95,, | Performed by: NURSE PRACTITIONER

## 2022-11-02 PROCEDURE — 99499 UNLISTED E&M SERVICE: CPT | Mod: 95,,, | Performed by: NURSE PRACTITIONER

## 2022-11-02 PROCEDURE — 1159F PR MEDICATION LIST DOCUMENTED IN MEDICAL RECORD: ICD-10-PCS | Mod: CPTII,95,, | Performed by: NURSE PRACTITIONER

## 2022-11-02 PROCEDURE — 3288F PR FALLS RISK ASSESSMENT DOCUMENTED: ICD-10-PCS | Mod: CPTII,95,, | Performed by: NURSE PRACTITIONER

## 2022-11-02 PROCEDURE — 1160F RVW MEDS BY RX/DR IN RCRD: CPT | Mod: CPTII,95,, | Performed by: NURSE PRACTITIONER

## 2022-11-02 PROCEDURE — 3061F PR NEG MICROALBUMINURIA RESULT DOCUMENTED/REVIEW: ICD-10-PCS | Mod: CPTII,95,, | Performed by: NURSE PRACTITIONER

## 2022-11-02 PROCEDURE — 1125F AMNT PAIN NOTED PAIN PRSNT: CPT | Mod: CPTII,95,, | Performed by: NURSE PRACTITIONER

## 2022-11-02 PROCEDURE — 1159F MED LIST DOCD IN RCRD: CPT | Mod: CPTII,95,, | Performed by: NURSE PRACTITIONER

## 2022-11-02 PROCEDURE — 3044F HG A1C LEVEL LT 7.0%: CPT | Mod: CPTII,95,, | Performed by: NURSE PRACTITIONER

## 2022-11-02 PROCEDURE — 1160F PR REVIEW ALL MEDS BY PRESCRIBER/CLIN PHARMACIST DOCUMENTED: ICD-10-PCS | Mod: CPTII,95,, | Performed by: NURSE PRACTITIONER

## 2022-11-02 PROCEDURE — 1125F PR PAIN SEVERITY QUANTIFIED, PAIN PRESENT: ICD-10-PCS | Mod: CPTII,95,, | Performed by: NURSE PRACTITIONER

## 2022-11-02 PROCEDURE — 1101F PR PT FALLS ASSESS DOC 0-1 FALLS W/OUT INJ PAST YR: ICD-10-PCS | Mod: CPTII,95,, | Performed by: NURSE PRACTITIONER

## 2022-11-02 PROCEDURE — 99499 RISK ADDL DX/OHS AUDIT: ICD-10-PCS | Mod: 95,,, | Performed by: NURSE PRACTITIONER

## 2022-11-02 PROCEDURE — 1170F FXNL STATUS ASSESSED: CPT | Mod: CPTII,95,, | Performed by: NURSE PRACTITIONER

## 2022-11-02 PROCEDURE — 3066F PR DOCUMENTATION OF TREATMENT FOR NEPHROPATHY: ICD-10-PCS | Mod: CPTII,95,, | Performed by: NURSE PRACTITIONER

## 2022-11-02 PROCEDURE — 1170F PR FUNCTIONAL STATUS ASSESSED: ICD-10-PCS | Mod: CPTII,95,, | Performed by: NURSE PRACTITIONER

## 2022-11-02 PROCEDURE — 1101F PT FALLS ASSESS-DOCD LE1/YR: CPT | Mod: CPTII,95,, | Performed by: NURSE PRACTITIONER

## 2022-11-02 PROCEDURE — G0439 PPPS, SUBSEQ VISIT: HCPCS | Mod: 95,,, | Performed by: NURSE PRACTITIONER

## 2022-11-02 PROCEDURE — 3044F PR MOST RECENT HEMOGLOBIN A1C LEVEL <7.0%: ICD-10-PCS | Mod: CPTII,95,, | Performed by: NURSE PRACTITIONER

## 2022-11-02 PROCEDURE — 3061F NEG MICROALBUMINURIA REV: CPT | Mod: CPTII,95,, | Performed by: NURSE PRACTITIONER

## 2022-11-02 PROCEDURE — 3066F NEPHROPATHY DOC TX: CPT | Mod: CPTII,95,, | Performed by: NURSE PRACTITIONER

## 2022-11-02 NOTE — TELEPHONE ENCOUNTER
Contact lens exam done, see exam of same date for documentation.    Called pt; informed pt I was just making a reminder call for her virtual visit today at 8:00am and to see if she needed any help; pt stated she didn't need any help; pt informed to login 15 minutes prior to appt time

## 2022-11-02 NOTE — PROGRESS NOTES
The patient location is: [place]  The chief complaint leading to consultation is: awv    Visit type: audiovisual    Face to Face time with patient: 60  60 minutes of total time spent on the encounter, which includes face to face time and non-face to face time preparing to see the patient (eg, review of tests), Obtaining and/or reviewing separately obtained history, Documenting clinical information in the electronic or other health record, Independently interpreting results (not separately reported) and communicating results to the patient/family/caregiver, or Care coordination (not separately reported).         Each patient to whom he or she provides medical services by telemedicine is:  (1) informed of the relationship between the physician and patient and the respective role of any other health care provider with respect to management of the patient; and (2) notified that he or she may decline to receive medical services by telemedicine and may withdraw from such care at any time.    Notes:   Review for Opioid Screening: Pt does not have Rx for Opioids    Review for Substance Use Disorders: Patient takes clonazepam daily for anxiety           Kailey Stokes presented for a  Medicare AWV and comprehensive Health Risk Assessment today. The following components were reviewed and updated:    Medical history  Family History  Social history  Allergies and Current Medications  Health Risk Assessment  Health Maintenance  Care Team         ** See Completed Assessments for Annual Wellness Visit within the encounter summary.**         The following assessments were completed:  Living Situation  CAGE  Depression Screening  Fall Risk Assessment (MACH 10)  Hearing Assessment(HHI)  Cognitive Function Screening  Nutrition Screening  ADL Screening  PAQ Screening      There were no vitals filed for this visit.  There is no height or weight on file to calculate BMI.  Physical Exam  Constitutional:       Appearance: Normal  appearance.   Neurological:      Mental Status: She is alert.   Psychiatric:         Attention and Perception: Attention and perception normal.         Mood and Affect: Mood and affect normal.         Speech: Speech normal.         Behavior: Behavior normal. Behavior is cooperative.         Thought Content: Thought content normal.         Cognition and Memory: Cognition and memory normal.         Judgment: Judgment normal.             Diagnoses and health risks identified today and associated recommendations/orders:    1. Encounter for preventive health examination  Stable, followed by provider    2. Aortic calcification  Stable, followed by provider, takes amlodipine, aspirin, rosuvastatin     3. Stage 3b chronic kidney disease  Stable, followed by provider, takes amlodipine for BP control    4. Hypertension, unspecified type  Stable, followed by provider, takes amlodipine    5. Hyperlipidemia, unspecified hyperlipidemia type  Stable, followed by provider, takes rosuvastatin       Provided Kailey with a 5-10 year written screening schedule and personal prevention plan. Recommendations were developed using the USPSTF age appropriate recommendations. Education, counseling, and referrals were provided as needed. After Visit Summary printed and given to patient which includes a list of additional screenings\tests needed.    Follow up in about 1 year (around 11/2/2023) for your next annual wellness visit.    Marco Segal NP  I offered to discuss advanced care planning, including how to pick a person who would make decisions for you if you were unable to make them for yourself, called a health care power of , and what kind of decisions you might make such as use of life sustaining treatments such as ventilators and tube feeding when faced with a life limiting illness recorded on a living will that they will need to know. (How you want to be cared for as you near the end of your natural life)     X Patient is  interested in learning more about how to make advanced directives.  I provided them paperwork and offered to discuss this with them.

## 2022-11-02 NOTE — PATIENT INSTRUCTIONS
Counseling and Referral of Other Preventative  (Italic type indicates deductible and co-insurance are waived)    Patient Name: Kailey Stokes  Today's Date: 11/2/2022    Health Maintenance       Date Due Completion Date    COVID-19 Vaccine (5 - Booster for Moderna series) 08/12/2022 6/17/2022    Mammogram 02/15/2023 2/15/2022    Lipid Panel 08/11/2023 8/11/2022    High Dose Statin 09/21/2023 9/21/2022    Colorectal Cancer Screening 10/18/2023 10/18/2018    DEXA Scan 12/26/2024 12/26/2019    TETANUS VACCINE 01/24/2027 1/24/2017        No orders of the defined types were placed in this encounter.    The following information is provided to all patients.  This information is to help you find resources for any of the problems found today that may be affecting your health:                Living healthy guide: www.[facility].[place].[facility]      Understanding Diabetes: www.diabetes.org      Eating healthy: www.cdc.gov/healthyweight      [facility] home safety checklist: www.cdc.gov/steadi/patient.html      Agency on Aging: www.goea.[place]      Alcoholics anonymous (AA): www.aa.org      Physical Activity: www.katy.nih.gov/rs2ntbv      Tobacco use: www.quitwithusla.org

## 2022-12-18 NOTE — TELEPHONE ENCOUNTER
No new care gaps identified.  [facility] Embedded Care Gaps. Reference number: [id number]. 12/18/2022   5:15:54 AM CST

## 2022-12-19 RX ORDER — PANTOPRAZOLE SODIUM 40 MG/1
TABLET, DELAYED RELEASE ORAL
Qty: 90 TABLET | Refills: 2 | Status: SHIPPED | OUTPATIENT
Start: 2022-12-19 | End: 2023-08-30 | Stop reason: SDUPTHER

## 2022-12-19 RX ORDER — AMLODIPINE BESYLATE 2.5 MG/1
TABLET ORAL
Qty: 90 TABLET | Refills: 2 | Status: SHIPPED | OUTPATIENT
Start: 2022-12-19 | End: 2023-02-14

## 2022-12-19 NOTE — TELEPHONE ENCOUNTER
Refill Decision Note   Kailey Stokes  is requesting a refill authorization.  Brief Assessment and Rationale for Refill:  Approve     Medication Therapy Plan:       Medication Reconciliation Completed: No   Comments:     No Care Gaps recommended.     Note composed:2:24 PM 12/19/2022

## 2023-02-07 ENCOUNTER — PATIENT MESSAGE (OUTPATIENT)
Dept: ADMINISTRATIVE | Facility: OTHER | Age: 73
End: 2023-02-07
Payer: MEDICARE

## 2023-02-07 DIAGNOSIS — Z00.00 ENCOUNTER FOR MEDICARE ANNUAL WELLNESS EXAM: ICD-10-CM

## 2023-02-09 DIAGNOSIS — Z00.00 ENCOUNTER FOR MEDICARE ANNUAL WELLNESS EXAM: ICD-10-CM

## 2023-02-14 ENCOUNTER — OFFICE VISIT (OUTPATIENT)
Dept: INTERNAL MEDICINE | Facility: CLINIC | Age: 73
End: 2023-02-14
Payer: MEDICARE

## 2023-02-14 ENCOUNTER — HOSPITAL ENCOUNTER (OUTPATIENT)
Dept: RADIOLOGY | Facility: HOSPITAL | Age: 73
Discharge: HOME | End: 2023-02-14
Attending: FAMILY MEDICINE
Payer: MEDICARE

## 2023-02-14 VITALS
HEART RATE: 74 BPM | HEIGHT: 69 IN | SYSTOLIC BLOOD PRESSURE: 120 MMHG | WEIGHT: 190.06 LBS | TEMPERATURE: 98 F | BODY MASS INDEX: 28.15 KG/M2 | DIASTOLIC BLOOD PRESSURE: 70 MMHG

## 2023-02-14 DIAGNOSIS — I70.0 AORTIC CALCIFICATION: ICD-10-CM

## 2023-02-14 DIAGNOSIS — F34.9 PERSISTENT MOOD (AFFECTIVE) DISORDER, UNSPECIFIED: ICD-10-CM

## 2023-02-14 DIAGNOSIS — N18.32 STAGE 3B CHRONIC KIDNEY DISEASE: ICD-10-CM

## 2023-02-14 DIAGNOSIS — F41.1 GAD (GENERALIZED ANXIETY DISORDER): ICD-10-CM

## 2023-02-14 DIAGNOSIS — E04.9 THYROID GOITER: ICD-10-CM

## 2023-02-14 DIAGNOSIS — E78.5 HYPERLIPIDEMIA, UNSPECIFIED HYPERLIPIDEMIA TYPE: Primary | ICD-10-CM

## 2023-02-14 DIAGNOSIS — R73.09 ABNORMAL GLUCOSE: ICD-10-CM

## 2023-02-14 PROCEDURE — 1101F PR PT FALLS ASSESS DOC 0-1 FALLS W/OUT INJ PAST YR: ICD-10-PCS | Mod: HCNC,CPTII,S$GLB, | Performed by: FAMILY MEDICINE

## 2023-02-14 PROCEDURE — 3074F SYST BP LT 130 MM HG: CPT | Mod: HCNC,CPTII,S$GLB, | Performed by: FAMILY MEDICINE

## 2023-02-14 PROCEDURE — 99499 RISK ADDL DX/OHS AUDIT: ICD-10-PCS | Mod: HCNC,S$GLB,, | Performed by: FAMILY MEDICINE

## 2023-02-14 PROCEDURE — 99999 PR PBB SHADOW E&M-EST. PATIENT-LVL IV: ICD-10-PCS | Mod: PBBFAC,HCNC,, | Performed by: FAMILY MEDICINE

## 2023-02-14 PROCEDURE — 3078F PR MOST RECENT DIASTOLIC BLOOD PRESSURE < 80 MM HG: ICD-10-PCS | Mod: HCNC,CPTII,S$GLB, | Performed by: FAMILY MEDICINE

## 2023-02-14 PROCEDURE — 1101F PT FALLS ASSESS-DOCD LE1/YR: CPT | Mod: HCNC,CPTII,S$GLB, | Performed by: FAMILY MEDICINE

## 2023-02-14 PROCEDURE — 3008F BODY MASS INDEX DOCD: CPT | Mod: HCNC,CPTII,S$GLB, | Performed by: FAMILY MEDICINE

## 2023-02-14 PROCEDURE — 99499 UNLISTED E&M SERVICE: CPT | Mod: HCNC,S$GLB,, | Performed by: FAMILY MEDICINE

## 2023-02-14 PROCEDURE — 1159F PR MEDICATION LIST DOCUMENTED IN MEDICAL RECORD: ICD-10-PCS | Mod: HCNC,CPTII,S$GLB, | Performed by: FAMILY MEDICINE

## 2023-02-14 PROCEDURE — 3078F DIAST BP <80 MM HG: CPT | Mod: HCNC,CPTII,S$GLB, | Performed by: FAMILY MEDICINE

## 2023-02-14 PROCEDURE — 76536 US EXAM OF HEAD AND NECK: CPT | Mod: TC,HCNC,PN

## 2023-02-14 PROCEDURE — 1159F MED LIST DOCD IN RCRD: CPT | Mod: HCNC,CPTII,S$GLB, | Performed by: FAMILY MEDICINE

## 2023-02-14 PROCEDURE — 3288F FALL RISK ASSESSMENT DOCD: CPT | Mod: HCNC,CPTII,S$GLB, | Performed by: FAMILY MEDICINE

## 2023-02-14 PROCEDURE — 99999 PR PBB SHADOW E&M-EST. PATIENT-LVL IV: CPT | Mod: PBBFAC,HCNC,, | Performed by: FAMILY MEDICINE

## 2023-02-14 PROCEDURE — 99214 OFFICE O/P EST MOD 30 MIN: CPT | Mod: HCNC,S$GLB,, | Performed by: FAMILY MEDICINE

## 2023-02-14 PROCEDURE — 3008F PR BODY MASS INDEX (BMI) DOCUMENTED: ICD-10-PCS | Mod: HCNC,CPTII,S$GLB, | Performed by: FAMILY MEDICINE

## 2023-02-14 PROCEDURE — 1160F RVW MEDS BY RX/DR IN RCRD: CPT | Mod: HCNC,CPTII,S$GLB, | Performed by: FAMILY MEDICINE

## 2023-02-14 PROCEDURE — 1125F PR PAIN SEVERITY QUANTIFIED, PAIN PRESENT: ICD-10-PCS | Mod: HCNC,CPTII,S$GLB, | Performed by: FAMILY MEDICINE

## 2023-02-14 PROCEDURE — 1125F AMNT PAIN NOTED PAIN PRSNT: CPT | Mod: HCNC,CPTII,S$GLB, | Performed by: FAMILY MEDICINE

## 2023-02-14 PROCEDURE — 76536 US SOFT TISSUE HEAD NECK THYROID: ICD-10-PCS | Mod: 26,HCNC,, | Performed by: STUDENT IN AN ORGANIZED HEALTH CARE EDUCATION/TRAINING PROGRAM

## 2023-02-14 PROCEDURE — 3074F PR MOST RECENT SYSTOLIC BLOOD PRESSURE < 130 MM HG: ICD-10-PCS | Mod: HCNC,CPTII,S$GLB, | Performed by: FAMILY MEDICINE

## 2023-02-14 PROCEDURE — 76536 US EXAM OF HEAD AND NECK: CPT | Mod: 26,HCNC,, | Performed by: STUDENT IN AN ORGANIZED HEALTH CARE EDUCATION/TRAINING PROGRAM

## 2023-02-14 PROCEDURE — 1160F PR REVIEW ALL MEDS BY PRESCRIBER/CLIN PHARMACIST DOCUMENTED: ICD-10-PCS | Mod: HCNC,CPTII,S$GLB, | Performed by: FAMILY MEDICINE

## 2023-02-14 PROCEDURE — 3288F PR FALLS RISK ASSESSMENT DOCUMENTED: ICD-10-PCS | Mod: HCNC,CPTII,S$GLB, | Performed by: FAMILY MEDICINE

## 2023-02-14 PROCEDURE — 99214 PR OFFICE/OUTPT VISIT, EST, LEVL IV, 30-39 MIN: ICD-10-PCS | Mod: HCNC,S$GLB,, | Performed by: FAMILY MEDICINE

## 2023-02-14 NOTE — PROGRESS NOTES
"Subjective:      Patient ID: Kailey Stokes is a 72 y.o. female.    Chief Complaint: Check up    HPI 72 y.o.   female patient with a PMHx of anxiety, HLD, HTN, and nerve damage presents to clinic for fu on chronic conditions. The patient was last seen on August 11, 2022      Today she reports she is no longer taking her BP medication (Amlodipine). Patient reports she had a syncope episode and was seen in the urgent care. She states she was advised to discontinue the Amlodipine/BP meds.    She is seeing Nephro//CKD stable  Patient reports her mood is up and down.  Good family support, spends a lot of time with her grandson.  Using klonopin nightly for sleep.    Past Medical History:   Diagnosis Date    Abnormal Pap smear     Anxiety     Dr. Bose    Breast disorder     Pain in left breast    Family history of colonic polyps 10/18/2018    Her brother and sister have both had colon polyps.    Hyperlipidemia     Hypertension     Nerve damage     legs    Persistent mood (affective) disorder, unspecified 2/14/2023     Family History   Problem Relation Age of Onset    Diabetes Mother     Glaucoma Mother     Hypertension Father     Heart disease Father     Arthritis Father     Diabetes Sister     Glaucoma Sister     Lymphoma Sister     Diabetes Brother     Breast cancer Maternal Aunt     Cancer Sister     COPD Brother     Kidney disease Brother      Past Surgical History:   Procedure Laterality Date    COLONOSCOPY N/A 10/18/2018    Procedure: COLONOSCOPY;  Surgeon: Jayjay Grier MD;  Location: [facility];  Service: Endoscopy;  Laterality: N/A;    HYSTERECTOMY      KNEE SURGERY      TOTAL ABDOMINAL HYSTERECTOMY W/ BILATERAL SALPINGOOPHORECTOMY       Social History     Tobacco Use    Smoking status: Never    Smokeless tobacco: Never   Substance Use Topics    Alcohol use: Not Currently     Comment: occ use    Drug use: No       /70   Pulse 74   Temp 98.1 °F (36.7 °C)   Ht 5' 9" (1.753 m)   Wt 86.2 kg (190 lb 0.6 " oz)   BMI 28.06 kg/m²     Review of Systems   Constitutional:  Negative for activity change, appetite change, chills, diaphoresis, fatigue, fever and unexpected weight change.   HENT:  Negative for congestion, ear pain, hearing loss, postnasal drip, rhinorrhea, sinus pain and sore throat.    Eyes:  Negative for pain, discharge, itching and visual disturbance.   Respiratory:  Negative for cough, chest tightness, shortness of breath and wheezing.    Cardiovascular:  Negative for chest pain, palpitations and leg swelling.   Gastrointestinal:  Negative for abdominal pain, constipation, diarrhea, nausea and vomiting.   Endocrine: Negative for polydipsia and polyuria.   Genitourinary:  Negative for difficulty urinating, dysuria, flank pain, frequency, menstrual problem, pelvic pain and urgency.   Musculoskeletal:  Negative for arthralgias, back pain, joint swelling, myalgias and neck pain.   Skin:  Negative for color change and rash.   Neurological:  Negative for dizziness, weakness, light-headedness and headaches.   Hematological:  Negative for adenopathy.   Psychiatric/Behavioral:  Positive for sleep disturbance. Negative for confusion, decreased concentration and dysphoric mood.      Objective:     Physical Exam  Vitals and nursing note reviewed.   Constitutional:       General: She is not in acute distress.  HENT:      Right Ear: External ear normal.      Left Ear: External ear normal.      Nose: Nose normal.   Eyes:      Conjunctiva/sclera: Conjunctivae normal.      Pupils: Pupils are equal, round, and reactive to light.   Cardiovascular:      Rate and Rhythm: Normal rate and regular rhythm.      Heart sounds: Normal heart sounds.   Pulmonary:      Effort: Pulmonary effort is normal. No respiratory distress.      Breath sounds: Normal breath sounds. No wheezing or rales.   Abdominal:      General: Bowel sounds are normal. There is no distension.      Palpations: Abdomen is soft.      Tenderness: There is no  abdominal tenderness. There is no guarding.   Musculoskeletal:      Cervical back: Normal range of motion and neck supple.      Right lower leg: No edema.      Left lower leg: No edema.   Skin:     General: Skin is warm and dry.      Findings: No rash.   Neurological:      Mental Status: She is alert and oriented to person, place, and time.   Psychiatric:         Behavior: Behavior normal.         Thought Content: Thought content normal.         Judgment: Judgment normal.       Lab Results   Component Value Date    WBC 5.85 08/11/2022    HGB 13.9 08/11/2022    HCT 41.1 08/11/2022     08/11/2022    CHOL 178 08/11/2022    TRIG 167 (H) 08/11/2022    HDL 53 08/11/2022    ALT 41 08/11/2022    AST 37 08/11/2022     08/11/2022    K 4.1 08/11/2022     08/11/2022    CREATININE 1.3 08/11/2022    BUN 11 08/11/2022    CO2 28 08/11/2022    TSH 2.193 08/11/2022    INR 1.0 09/23/2019    HGBA1C 5.7 (H) 08/11/2022       Assessment:     1. Hyperlipidemia, unspecified hyperlipidemia type    2. Stage 3b chronic kidney disease    3. KRISTINE (generalized anxiety disorder)    4. Persistent mood (affective) disorder, unspecified    5. Aortic calcification    6. Thyroid goiter    7. Abnormal glucose         Plan:     Hyperlipidemia, unspecified hyperlipidemia type  -     Lipid Panel; Future; Expected date: 08/14/2023  -     TSH; Future; Expected date: 08/14/2023    Stage 3b chronic kidney disease  -     CBC Auto Differential; Future; Expected date: 08/14/2023  -     Comprehensive Metabolic Panel; Future; Expected date: 08/14/2023    KRISTINE (generalized anxiety disorder)    Persistent mood (affective) disorder, unspecified  Comments:  Stable//off SSRI//using klonopin nightly for sleep    Aortic calcification  Comments:  On ASA/Statin    Thyroid goiter  -     US Soft Tissue Head Neck Thyroid; Future; Expected date: 02/14/2023  -     TSH; Future; Expected date: 08/14/2023    Abnormal glucose  -     Hemoglobin A1C; Future; Expected  date: 08/14/2023        Vitals reviewed. BP within normal range 120/70    Will update thyroid US.  Stage 3 CKD stable.  Mood stable//klonopin nightly  Patient overall doing well.  Questions and concerns addressed.  Follow up in 6 months will labs        Documentation entered by Leigh Yeager, acting as scribe for Dr. Karel Durán. 02/14/2023 9:46 AM.

## 2023-03-22 ENCOUNTER — OFFICE VISIT (OUTPATIENT)
Dept: OPHTHALMOLOGY | Facility: CLINIC | Age: 73
End: 2023-03-22
Payer: COMMERCIAL

## 2023-03-22 DIAGNOSIS — H52.03 HYPEROPIA WITH PRESBYOPIA, BILATERAL: ICD-10-CM

## 2023-03-22 DIAGNOSIS — H25.13 NUCLEAR SCLEROSIS, BILATERAL: Primary | ICD-10-CM

## 2023-03-22 DIAGNOSIS — H52.4 HYPEROPIA WITH PRESBYOPIA, BILATERAL: ICD-10-CM

## 2023-03-22 PROCEDURE — 92015 PR REFRACTION: ICD-10-PCS | Mod: HCNC,S$GLB,, | Performed by: OPTOMETRIST

## 2023-03-22 PROCEDURE — 92014 COMPRE OPH EXAM EST PT 1/>: CPT | Mod: HCNC,S$GLB,, | Performed by: OPTOMETRIST

## 2023-03-22 PROCEDURE — 99999 PR PBB SHADOW E&M-EST. PATIENT-LVL III: ICD-10-PCS | Mod: PBBFAC,HCNC,, | Performed by: OPTOMETRIST

## 2023-03-22 PROCEDURE — 99999 PR PBB SHADOW E&M-EST. PATIENT-LVL III: CPT | Mod: PBBFAC,HCNC,, | Performed by: OPTOMETRIST

## 2023-03-22 PROCEDURE — 92014 PR EYE EXAM, EST PATIENT,COMPREHESV: ICD-10-PCS | Mod: HCNC,S$GLB,, | Performed by: OPTOMETRIST

## 2023-03-22 PROCEDURE — 92015 DETERMINE REFRACTIVE STATE: CPT | Mod: HCNC,S$GLB,, | Performed by: OPTOMETRIST

## 2023-03-22 NOTE — PROGRESS NOTES
HPI    Last eye exam 03/04/22 DNL  Pt uses [facility]     RTC 1 yr for dilated eye exam   Vision changes since last eye exam?: yes     Any eye pain today: not today, but OD sometimes hurt by the tear duct    Other ocular symptoms: no    Interested in contact lens fitting today? no             Last edited by Vania Mcgraw on 3/22/2023 10:21 AM.            Assessment /Plan     For exam results, see Encounter Report.    Nuclear sclerosis, bilateral  Visually significant cataract.  Patient is at the option stage.   Cataract surgery will improve vision, but necessity is dependent on patient's symptoms.  Will continue to monitor over the next 12 months. Pt to call or RTC with any significant change in vision prior to next visit.    Hyperopia with presbyopia, bilateral  Eyeglass Final Rx       Eyeglass Final Rx         Sphere Add    Right +1.00 +2.50    Left +0.75 +2.50      Expiration Date: 3/22/2024                  RTC 1 yr for dilated eye exam or PRN if any problems.   Discussed above and answered questions.

## 2023-04-03 ENCOUNTER — PATIENT MESSAGE (OUTPATIENT)
Dept: ADMINISTRATIVE | Facility: OTHER | Age: 73
End: 2023-04-03
Payer: MEDICARE

## 2023-04-17 ENCOUNTER — OFFICE VISIT (OUTPATIENT)
Dept: DERMATOLOGY | Facility: CLINIC | Age: 73
End: 2023-04-17
Payer: MEDICARE

## 2023-04-17 DIAGNOSIS — L65.8 FEMALE PATTERN HAIR LOSS: ICD-10-CM

## 2023-04-17 DIAGNOSIS — L65.9 ALOPECIA: Primary | ICD-10-CM

## 2023-04-17 PROCEDURE — 1159F MED LIST DOCD IN RCRD: CPT | Mod: HCNC,CPTII,S$GLB, | Performed by: DERMATOLOGY

## 2023-04-17 PROCEDURE — 3288F PR FALLS RISK ASSESSMENT DOCUMENTED: ICD-10-PCS | Mod: HCNC,CPTII,S$GLB, | Performed by: DERMATOLOGY

## 2023-04-17 PROCEDURE — 99214 PR OFFICE/OUTPT VISIT, EST, LEVL IV, 30-39 MIN: ICD-10-PCS | Mod: HCNC,S$GLB,, | Performed by: DERMATOLOGY

## 2023-04-17 PROCEDURE — 3288F FALL RISK ASSESSMENT DOCD: CPT | Mod: HCNC,CPTII,S$GLB, | Performed by: DERMATOLOGY

## 2023-04-17 PROCEDURE — 1159F PR MEDICATION LIST DOCUMENTED IN MEDICAL RECORD: ICD-10-PCS | Mod: HCNC,CPTII,S$GLB, | Performed by: DERMATOLOGY

## 2023-04-17 PROCEDURE — 99999 PR PBB SHADOW E&M-EST. PATIENT-LVL III: CPT | Mod: PBBFAC,HCNC,, | Performed by: DERMATOLOGY

## 2023-04-17 PROCEDURE — 1101F PR PT FALLS ASSESS DOC 0-1 FALLS W/OUT INJ PAST YR: ICD-10-PCS | Mod: HCNC,CPTII,S$GLB, | Performed by: DERMATOLOGY

## 2023-04-17 PROCEDURE — 1160F PR REVIEW ALL MEDS BY PRESCRIBER/CLIN PHARMACIST DOCUMENTED: ICD-10-PCS | Mod: HCNC,CPTII,S$GLB, | Performed by: DERMATOLOGY

## 2023-04-17 PROCEDURE — 1126F PR PAIN SEVERITY QUANTIFIED, NO PAIN PRESENT: ICD-10-PCS | Mod: HCNC,CPTII,S$GLB, | Performed by: DERMATOLOGY

## 2023-04-17 PROCEDURE — 1101F PT FALLS ASSESS-DOCD LE1/YR: CPT | Mod: HCNC,CPTII,S$GLB, | Performed by: DERMATOLOGY

## 2023-04-17 PROCEDURE — 1126F AMNT PAIN NOTED NONE PRSNT: CPT | Mod: HCNC,CPTII,S$GLB, | Performed by: DERMATOLOGY

## 2023-04-17 PROCEDURE — 1160F RVW MEDS BY RX/DR IN RCRD: CPT | Mod: HCNC,CPTII,S$GLB, | Performed by: DERMATOLOGY

## 2023-04-17 PROCEDURE — 99999 PR PBB SHADOW E&M-EST. PATIENT-LVL III: ICD-10-PCS | Mod: PBBFAC,HCNC,, | Performed by: DERMATOLOGY

## 2023-04-17 PROCEDURE — 99214 OFFICE O/P EST MOD 30 MIN: CPT | Mod: HCNC,S$GLB,, | Performed by: DERMATOLOGY

## 2023-04-17 RX ORDER — MOMETASONE FUROATE 1 MG/ML
SOLUTION TOPICAL
Qty: 60 ML | Refills: 3 | Status: SHIPPED | OUTPATIENT
Start: 2023-04-17

## 2023-04-17 NOTE — PROGRESS NOTES
Subjective:      Patient ID:  Kailey Stokes is a 72 y.o. female who presents for   Chief Complaint   Patient presents with    Hair Loss    Mole     Side of face      Hx of thyroid nodules, hair thinning, last seen on 7/18/22.  She c/o thinning of the occipital scalp, currently on OTC biotin and mometasone solution qD.  Uncertain if improvement.  + shedding.        Review of Systems   Constitutional:  Negative for fever and chills.   Gastrointestinal:  Negative for nausea and vomiting.   Skin:  Positive for activity-related sunscreen use. Negative for daily sunscreen use and recent sunburn.   Hematologic/Lymphatic: Does not bruise/bleed easily.     Objective:   Physical Exam   Constitutional: She appears well-developed and well-nourished. No distress.   Neurological: She is alert and oriented to person, place, and time. She is not disoriented.   Psychiatric: She has a normal mood and affect.   Skin:   Areas Examined (abnormalities noted in diagram):   Scalp / Hair Palpated and Inspected  Head / Face Inspection Performed  Neck Inspection Performed  RUE Inspected  LUE Inspection Performed  Nails and Digits Inspection Performed                Assessment / Plan:        Alopecia  Female pattern hair loss  -     mometasone (ELOCON) 0.1 % solution; Apply to scalp once daily  Dispense: 60 mL; Refill: 3  -     will add hairstim minoxidil c/ finasteride.  Will continue above medication, suspect related to thyroid nodules.            Follow up in about 3 months (around 7/17/2023).

## 2023-04-26 ENCOUNTER — OFFICE VISIT (OUTPATIENT)
Dept: INTERNAL MEDICINE | Facility: CLINIC | Age: 73
End: 2023-04-26
Payer: MEDICARE

## 2023-04-26 VITALS
BODY MASS INDEX: 28.54 KG/M2 | OXYGEN SATURATION: 95 % | SYSTOLIC BLOOD PRESSURE: 138 MMHG | HEART RATE: 81 BPM | WEIGHT: 192.69 LBS | DIASTOLIC BLOOD PRESSURE: 70 MMHG | TEMPERATURE: 98 F | HEIGHT: 69 IN

## 2023-04-26 DIAGNOSIS — E78.5 HYPERLIPIDEMIA, UNSPECIFIED HYPERLIPIDEMIA TYPE: ICD-10-CM

## 2023-04-26 DIAGNOSIS — K59.09 CHRONIC CONSTIPATION: Primary | ICD-10-CM

## 2023-04-26 PROCEDURE — 1160F PR REVIEW ALL MEDS BY PRESCRIBER/CLIN PHARMACIST DOCUMENTED: ICD-10-PCS | Mod: HCNC,CPTII,S$GLB, | Performed by: NURSE PRACTITIONER

## 2023-04-26 PROCEDURE — 99999 PR PBB SHADOW E&M-EST. PATIENT-LVL IV: CPT | Mod: PBBFAC,HCNC,, | Performed by: NURSE PRACTITIONER

## 2023-04-26 PROCEDURE — 3078F PR MOST RECENT DIASTOLIC BLOOD PRESSURE < 80 MM HG: ICD-10-PCS | Mod: HCNC,CPTII,S$GLB, | Performed by: NURSE PRACTITIONER

## 2023-04-26 PROCEDURE — 1101F PT FALLS ASSESS-DOCD LE1/YR: CPT | Mod: HCNC,CPTII,S$GLB, | Performed by: NURSE PRACTITIONER

## 2023-04-26 PROCEDURE — 3008F BODY MASS INDEX DOCD: CPT | Mod: HCNC,CPTII,S$GLB, | Performed by: NURSE PRACTITIONER

## 2023-04-26 PROCEDURE — 3075F PR MOST RECENT SYSTOLIC BLOOD PRESS GE 130-139MM HG: ICD-10-PCS | Mod: HCNC,CPTII,S$GLB, | Performed by: NURSE PRACTITIONER

## 2023-04-26 PROCEDURE — 3288F FALL RISK ASSESSMENT DOCD: CPT | Mod: HCNC,CPTII,S$GLB, | Performed by: NURSE PRACTITIONER

## 2023-04-26 PROCEDURE — 99214 OFFICE O/P EST MOD 30 MIN: CPT | Mod: HCNC,S$GLB,, | Performed by: NURSE PRACTITIONER

## 2023-04-26 PROCEDURE — 1160F RVW MEDS BY RX/DR IN RCRD: CPT | Mod: HCNC,CPTII,S$GLB, | Performed by: NURSE PRACTITIONER

## 2023-04-26 PROCEDURE — 99999 PR PBB SHADOW E&M-EST. PATIENT-LVL IV: ICD-10-PCS | Mod: PBBFAC,HCNC,, | Performed by: NURSE PRACTITIONER

## 2023-04-26 PROCEDURE — 3288F PR FALLS RISK ASSESSMENT DOCUMENTED: ICD-10-PCS | Mod: HCNC,CPTII,S$GLB, | Performed by: NURSE PRACTITIONER

## 2023-04-26 PROCEDURE — 3075F SYST BP GE 130 - 139MM HG: CPT | Mod: HCNC,CPTII,S$GLB, | Performed by: NURSE PRACTITIONER

## 2023-04-26 PROCEDURE — 3008F PR BODY MASS INDEX (BMI) DOCUMENTED: ICD-10-PCS | Mod: HCNC,CPTII,S$GLB, | Performed by: NURSE PRACTITIONER

## 2023-04-26 PROCEDURE — 1159F MED LIST DOCD IN RCRD: CPT | Mod: HCNC,CPTII,S$GLB, | Performed by: NURSE PRACTITIONER

## 2023-04-26 PROCEDURE — 1159F PR MEDICATION LIST DOCUMENTED IN MEDICAL RECORD: ICD-10-PCS | Mod: HCNC,CPTII,S$GLB, | Performed by: NURSE PRACTITIONER

## 2023-04-26 PROCEDURE — 3078F DIAST BP <80 MM HG: CPT | Mod: HCNC,CPTII,S$GLB, | Performed by: NURSE PRACTITIONER

## 2023-04-26 PROCEDURE — 99214 PR OFFICE/OUTPT VISIT, EST, LEVL IV, 30-39 MIN: ICD-10-PCS | Mod: HCNC,S$GLB,, | Performed by: NURSE PRACTITIONER

## 2023-04-26 PROCEDURE — 1101F PR PT FALLS ASSESS DOC 0-1 FALLS W/OUT INJ PAST YR: ICD-10-PCS | Mod: HCNC,CPTII,S$GLB, | Performed by: NURSE PRACTITIONER

## 2023-04-26 NOTE — PROGRESS NOTES
"Subjective:       Patient ID: Kailey Stokes is a 72 y.o. female.    Chief Complaint: Constipation    Patient here for worsening constipation  Saw Dr. Valenzuela was given linzess and then the dose was increased  Linzess helps stool move into the rectum but patient not able to get the stool out until she uses an enema; blood on tip of enema after insertion  Patients niece just dx with colon cancer  Wants referral to gastro to discuss scope  Feels something not right  Does have bloating/cramping/pain    Constipation  Pertinent negatives include no fever.     /70   Pulse 81   Temp 97.5 °F (36.4 °C) (Tympanic)   Ht 5' 9" (1.753 m)   Wt 87.4 kg (192 lb 10.9 oz)   SpO2 95%   BMI 28.45 kg/m²     Review of Systems   Constitutional:  Negative for activity change, appetite change, chills, diaphoresis, fatigue, fever and unexpected weight change.   HENT: Negative.     Respiratory:  Negative for cough and shortness of breath.    Cardiovascular:  Negative for chest pain, palpitations and leg swelling.   Gastrointestinal:  Positive for constipation.   Genitourinary: Negative.    Musculoskeletal: Negative.    Skin:  Negative for color change, pallor, rash and wound.   Allergic/Immunologic: Negative for immunocompromised state.   Neurological: Negative.  Negative for dizziness and facial asymmetry.   Hematological:  Negative for adenopathy. Does not bruise/bleed easily.   Psychiatric/Behavioral:  Negative for agitation, behavioral problems and confusion.      Objective:      Physical Exam  Vitals and nursing note reviewed.   Constitutional:       General: She is not in acute distress.     Appearance: Normal appearance. She is well-developed. She is not diaphoretic.   HENT:      Head: Normocephalic and atraumatic.   Cardiovascular:      Rate and Rhythm: Normal rate and regular rhythm.      Heart sounds: Normal heart sounds. No murmur heard.  Pulmonary:      Effort: Pulmonary effort is normal. No respiratory distress.      " Breath sounds: Normal breath sounds.   Musculoskeletal:         General: Normal range of motion.   Skin:     General: Skin is warm and dry.      Findings: No rash.   Neurological:      Mental Status: She is alert.   Psychiatric:         Mood and Affect: Mood normal.         Behavior: Behavior normal. Behavior is cooperative.         Thought Content: Thought content normal.         Judgment: Judgment normal.       Assessment:       1. Chronic constipation    2. Hyperlipidemia, unspecified hyperlipidemia type        Plan:       Kailey was seen today for constipation.    Diagnoses and all orders for this visit:    Chronic constipation  -     Ambulatory referral/consult to Gastroenterology; Future  On linzess but not having great success  Gastro referral per request     Hyperlipidemia, unspecified hyperlipidemia type  Stable on statin  Lab Results   Component Value Date    LDLCALC 91.6 08/11/2022

## 2023-05-09 ENCOUNTER — PATIENT MESSAGE (OUTPATIENT)
Dept: INTERNAL MEDICINE | Facility: CLINIC | Age: 73
End: 2023-05-09
Payer: MEDICARE

## 2023-05-16 ENCOUNTER — HOSPITAL ENCOUNTER (OUTPATIENT)
Dept: RADIOLOGY | Facility: HOSPITAL | Age: 73
Discharge: HOME | End: 2023-05-16
Attending: INTERNAL MEDICINE
Payer: MEDICARE

## 2023-05-16 ENCOUNTER — OFFICE VISIT (OUTPATIENT)
Dept: GASTROENTEROLOGY | Facility: CLINIC | Age: 73
End: 2023-05-16
Payer: MEDICARE

## 2023-05-16 VITALS
WEIGHT: 192.69 LBS | OXYGEN SATURATION: 98 % | HEIGHT: 69 IN | SYSTOLIC BLOOD PRESSURE: 116 MMHG | DIASTOLIC BLOOD PRESSURE: 76 MMHG | HEART RATE: 79 BPM | BODY MASS INDEX: 28.54 KG/M2

## 2023-05-16 DIAGNOSIS — K59.09 CHRONIC CONSTIPATION: ICD-10-CM

## 2023-05-16 PROCEDURE — 1101F PR PT FALLS ASSESS DOC 0-1 FALLS W/OUT INJ PAST YR: ICD-10-PCS | Mod: CPTII,,, | Performed by: INTERNAL MEDICINE

## 2023-05-16 PROCEDURE — 3074F SYST BP LT 130 MM HG: CPT | Mod: CPTII,,, | Performed by: INTERNAL MEDICINE

## 2023-05-16 PROCEDURE — 1159F PR MEDICATION LIST DOCUMENTED IN MEDICAL RECORD: ICD-10-PCS | Mod: CPTII,,, | Performed by: INTERNAL MEDICINE

## 2023-05-16 PROCEDURE — 3078F DIAST BP <80 MM HG: CPT | Mod: CPTII,,, | Performed by: INTERNAL MEDICINE

## 2023-05-16 PROCEDURE — 1101F PT FALLS ASSESS-DOCD LE1/YR: CPT | Mod: CPTII,,, | Performed by: INTERNAL MEDICINE

## 2023-05-16 PROCEDURE — 1126F AMNT PAIN NOTED NONE PRSNT: CPT | Mod: CPTII,,, | Performed by: INTERNAL MEDICINE

## 2023-05-16 PROCEDURE — 99999 PR PBB SHADOW E&M-EST. PATIENT-LVL V: CPT | Mod: PBBFAC,,, | Performed by: INTERNAL MEDICINE

## 2023-05-16 PROCEDURE — 3008F PR BODY MASS INDEX (BMI) DOCUMENTED: ICD-10-PCS | Mod: CPTII,,, | Performed by: INTERNAL MEDICINE

## 2023-05-16 PROCEDURE — 99204 OFFICE O/P NEW MOD 45 MIN: CPT | Mod: ,,, | Performed by: INTERNAL MEDICINE

## 2023-05-16 PROCEDURE — 3074F PR MOST RECENT SYSTOLIC BLOOD PRESSURE < 130 MM HG: ICD-10-PCS | Mod: CPTII,,, | Performed by: INTERNAL MEDICINE

## 2023-05-16 PROCEDURE — 1160F PR REVIEW ALL MEDS BY PRESCRIBER/CLIN PHARMACIST DOCUMENTED: ICD-10-PCS | Mod: CPTII,,, | Performed by: INTERNAL MEDICINE

## 2023-05-16 PROCEDURE — 74019 RADEX ABDOMEN 2 VIEWS: CPT | Mod: 26,,, | Performed by: RADIOLOGY

## 2023-05-16 PROCEDURE — 1159F MED LIST DOCD IN RCRD: CPT | Mod: CPTII,,, | Performed by: INTERNAL MEDICINE

## 2023-05-16 PROCEDURE — 3288F FALL RISK ASSESSMENT DOCD: CPT | Mod: CPTII,,, | Performed by: INTERNAL MEDICINE

## 2023-05-16 PROCEDURE — 99999 PR PBB SHADOW E&M-EST. PATIENT-LVL V: ICD-10-PCS | Mod: PBBFAC,,, | Performed by: INTERNAL MEDICINE

## 2023-05-16 PROCEDURE — 74019 XR ABDOMEN FLAT AND ERECT: ICD-10-PCS | Mod: 26,,, | Performed by: RADIOLOGY

## 2023-05-16 PROCEDURE — 1160F RVW MEDS BY RX/DR IN RCRD: CPT | Mod: CPTII,,, | Performed by: INTERNAL MEDICINE

## 2023-05-16 PROCEDURE — 99204 PR OFFICE/OUTPT VISIT, NEW, LEVL IV, 45-59 MIN: ICD-10-PCS | Mod: ,,, | Performed by: INTERNAL MEDICINE

## 2023-05-16 PROCEDURE — 3078F PR MOST RECENT DIASTOLIC BLOOD PRESSURE < 80 MM HG: ICD-10-PCS | Mod: CPTII,,, | Performed by: INTERNAL MEDICINE

## 2023-05-16 PROCEDURE — 74019 RADEX ABDOMEN 2 VIEWS: CPT | Mod: TC,FY,PO

## 2023-05-16 PROCEDURE — 3288F PR FALLS RISK ASSESSMENT DOCUMENTED: ICD-10-PCS | Mod: CPTII,,, | Performed by: INTERNAL MEDICINE

## 2023-05-16 PROCEDURE — 3008F BODY MASS INDEX DOCD: CPT | Mod: CPTII,,, | Performed by: INTERNAL MEDICINE

## 2023-05-16 PROCEDURE — 1126F PR PAIN SEVERITY QUANTIFIED, NO PAIN PRESENT: ICD-10-PCS | Mod: CPTII,,, | Performed by: INTERNAL MEDICINE

## 2023-05-16 RX ORDER — ACETAMINOPHEN 500 MG/1
CAPSULE, LIQUID FILLED ORAL
COMMUNITY

## 2023-05-16 NOTE — ASSESSMENT & PLAN NOTE
Plan:   -Continue Linzess   -High fiber diet   -Drink at least 64 ounces of water a day   -Abdominal xray ordered   -Colonoscopy ordered

## 2023-05-16 NOTE — PROGRESS NOTES
"Clinic Consult:  [facility] Consultation Note    Reason for Consult:  The encounter diagnosis was Chronic constipation.    PCP: Karel Flowers   [id number] / [place] [id number]    HPI:  This is a 72 y.o. female here for evaluation of the following:     //Constipation  It has been getting worst   She was given Linzess for constipation   It comes out in "pieces"  She has to use fleet enema to help  She has abdominal gas   She has incomplete evacuation   Patient is asking for colonoscopy   Endorses at least 64 ounces of water a day   Does not endorse a high fiber diet     //Colonoscopies   -Last colonoscopy was in 2017       ROS:  As per HPI       Medical History:   Past Medical History:   Diagnosis Date    Abnormal Pap smear     Anxiety     Dr. Bose    Breast disorder     Pain in left breast    Family history of colonic polyps 10/18/2018    Her brother and sister have both had colon polyps.    Hyperlipidemia     Hypertension     Nerve damage     legs    Persistent mood (affective) disorder, unspecified 2/14/2023       Surgical History:  Past Surgical History:   Procedure Laterality Date    COLONOSCOPY N/A 10/18/2018    Procedure: COLONOSCOPY;  Surgeon: Jayjay Grier MD;  Location: [facility];  Service: Endoscopy;  Laterality: N/A;    HYSTERECTOMY      KNEE SURGERY      TOTAL ABDOMINAL HYSTERECTOMY W/ BILATERAL SALPINGOOPHORECTOMY         Family History:   Family History   Problem Relation Age of Onset    Diabetes Mother     Glaucoma Mother     Hypertension Father     Heart disease Father     Arthritis Father     Diabetes Sister     Glaucoma Sister     Lymphoma Sister     Diabetes Brother     Breast cancer Maternal Aunt     Cancer Sister     COPD Brother     Kidney disease Brother        Social History:   Social History     Tobacco Use    Smoking status: Never    Smokeless tobacco: Never   Substance Use Topics    Alcohol use: Not Currently     Comment: occ use    Drug use: No " "      Allergies: Reviewed    Home Medications:   Medication List with Changes/Refills   Current Medications    ACETAMINOPHEN (TYLENOL) 500 MG CAP    Take by mouth.    ASCORBIC ACID, VITAMIN C, (VITAMIN C) 1000 MG TABLET    Take 1,000 mg by mouth.    ASPIRIN (ECOTRIN) 81 MG EC TABLET    Take 81 mg by mouth once daily.    CHOLECALCIFEROL, VITAMIN D3, 125 MCG (5,000 UNIT) CAPSULE    Take 5,000 Units by mouth.    CLONAZEPAM (KLONOPIN) 0.5 MG TABLET    TAKE 1 TABLET(0.5 MG) BY MOUTH DAILY AS NEEDED FOR ANXIETY    CRANBERRY FRUIT EXTRACT (CRANBERRY EXTRACT ORAL)    Take 1 tablet by mouth once daily.    ERGOCALCIFEROL, VITAMIN D2, (VITAMIN D ORAL)    Take 1 tablet by mouth once daily.    ESTRADIOL (ESTRACE) 0.5 MG TABLET    Take 1 tablet (0.5 mg total) by mouth once daily.    FLUOCINOLONE (DERMA-SMOOTHE) 0.01 % EXTERNAL OIL    Apply oil to scalp once a day    FLUTICASONE PROPIONATE (FLONASE) 50 MCG/ACTUATION NASAL SPRAY    SHAKE LIQUID AND USE 2 SPRAYS(100 MCG) IN EACH NOSTRIL EVERY DAY    ICOSAPENT ETHYL (VASCEPA) 1 GRAM CAP    Take 2 capsules (2 g total) by mouth once daily.    L GASSERI/B BIFIDUM/B LONGUM ([employer] [facility] [employer] COLON HEALTH ORAL)    Take by mouth.    LINACLOTIDE (LINZESS ORAL)    Take 290 mg by mouth.    MOMETASONE (ELOCON) 0.1 % SOLUTION    Apply to scalp once daily    PANTOPRAZOLE (PROTONIX) 40 MG TABLET    TAKE 1 TABLET(40 MG) BY MOUTH EVERY DAY    ROSUVASTATIN (CRESTOR) 40 MG TAB    Take 1 tablet (40 mg total) by mouth every evening.    THIAMINE HCL (VITAMIN B-1) 500 MG TABLET    Take 500 mg by mouth once daily.         Physical Exam:  Vital Signs:  /76   Pulse 79   Ht 5' 9" (1.753 m)   Wt 87.4 kg (192 lb 10.9 oz)   SpO2 98%   BMI 28.45 kg/m²   Body mass index is 28.45 kg/m².    Physical Exam  Constitutional:       Appearance: Normal appearance.   HENT:      Head: Normocephalic.   Eyes:      Conjunctiva/sclera: Conjunctivae normal.   Pulmonary:      Effort: Pulmonary effort is normal.   Abdominal:    "   General: There is no distension.      Palpations: Abdomen is soft.      Tenderness: There is no abdominal tenderness. There is no guarding.   Neurological:      Mental Status: She is alert.        Labs: Pertinent labs reviewed.      Assessment:  Problem List Items Addressed This Visit          GI    Chronic constipation    Current Assessment & Plan     Plan:   -Continue Linzess   -High fiber diet   -Drink at least 64 ounces of water a day   -Abdominal xray ordered   -Colonoscopy ordered             Chronic constipation  -     Ambulatory referral/consult to Gastroenterology  -     X-Ray Abdomen Flat And Erect; Future; Expected date: 05/16/2023  -     Ambulatory referral/consult to Endo Procedure ; Future; Expected date: 05/17/2023            Follow-up after diagnostic evaluation.     Order summary:  Orders Placed This Encounter   Procedures    X-Ray Abdomen Flat And Erect    Ambulatory referral/consult to Endo Procedure        Thank you so much for allowing me to participate in the care of Kailey Hilliard MD  Gastroenterology and Hepatology  [facility]

## 2023-05-22 ENCOUNTER — HOSPITAL ENCOUNTER (OUTPATIENT)
Dept: PREADMISSION TESTING | Facility: HOSPITAL | Age: 73
Discharge: HOME | End: 2023-05-22
Attending: INTERNAL MEDICINE
Payer: MEDICARE

## 2023-05-22 DIAGNOSIS — K59.09 CHRONIC CONSTIPATION: Primary | ICD-10-CM

## 2023-05-23 ENCOUNTER — TELEPHONE (OUTPATIENT)
Dept: ADMINISTRATIVE | Facility: HOSPITAL | Age: 73
End: 2023-05-23
Payer: MEDICARE

## 2023-05-23 RX ORDER — POLYETHYLENE GLYCOL 3350, SODIUM SULFATE ANHYDROUS, SODIUM BICARBONATE, SODIUM CHLORIDE, POTASSIUM CHLORIDE 236; 22.74; 6.74; 5.86; 2.97 G/4L; G/4L; G/4L; G/4L; G/4L
4 POWDER, FOR SOLUTION ORAL ONCE
Qty: 4000 ML | Refills: 0 | Status: SHIPPED | OUTPATIENT
Start: 2023-05-23 | End: 2023-05-23

## 2023-05-24 ENCOUNTER — PATIENT MESSAGE (OUTPATIENT)
Dept: ADMINISTRATIVE | Facility: CLINIC | Age: 73
End: 2023-05-24
Payer: MEDICARE

## 2023-05-24 ENCOUNTER — TELEPHONE (OUTPATIENT)
Dept: ADMINISTRATIVE | Facility: CLINIC | Age: 73
End: 2023-05-24
Payer: MEDICARE

## 2023-05-24 NOTE — TELEPHONE ENCOUNTER
Called pt; informed pt I was calling to confirm her virtual EAWV on 5/25/23 at 1:00pm and to see if she needed any help; pt stated she did not need any help and would complete e-pre check later today; pt informed to login 10 minutes prior to appt time; Zola message sent

## 2023-05-25 ENCOUNTER — TELEPHONE (OUTPATIENT)
Dept: ADMINISTRATIVE | Facility: CLINIC | Age: 73
End: 2023-05-25
Payer: MEDICARE

## 2023-05-25 ENCOUNTER — OFFICE VISIT (OUTPATIENT)
Dept: FAMILY MEDICINE | Facility: CLINIC | Age: 73
End: 2023-05-25
Payer: MEDICARE

## 2023-05-25 ENCOUNTER — PATIENT MESSAGE (OUTPATIENT)
Dept: GASTROENTEROLOGY | Facility: CLINIC | Age: 73
End: 2023-05-25
Payer: MEDICARE

## 2023-05-25 ENCOUNTER — PATIENT MESSAGE (OUTPATIENT)
Dept: FAMILY MEDICINE | Facility: CLINIC | Age: 73
End: 2023-05-25

## 2023-05-25 VITALS
BODY MASS INDEX: 28.14 KG/M2 | WEIGHT: 190 LBS | HEIGHT: 69 IN | SYSTOLIC BLOOD PRESSURE: 130 MMHG | DIASTOLIC BLOOD PRESSURE: 80 MMHG

## 2023-05-25 DIAGNOSIS — F34.9 PERSISTENT MOOD (AFFECTIVE) DISORDER, UNSPECIFIED: ICD-10-CM

## 2023-05-25 DIAGNOSIS — Z00.00 ENCOUNTER FOR MEDICARE ANNUAL WELLNESS EXAM: ICD-10-CM

## 2023-05-25 DIAGNOSIS — Z00.00 ENCOUNTER FOR PREVENTIVE HEALTH EXAMINATION: Primary | ICD-10-CM

## 2023-05-25 DIAGNOSIS — N18.32 STAGE 3B CHRONIC KIDNEY DISEASE: ICD-10-CM

## 2023-05-25 DIAGNOSIS — I70.0 AORTIC CALCIFICATION: ICD-10-CM

## 2023-05-25 DIAGNOSIS — K21.9 GASTROESOPHAGEAL REFLUX DISEASE, UNSPECIFIED WHETHER ESOPHAGITIS PRESENT: ICD-10-CM

## 2023-05-25 DIAGNOSIS — I10 HYPERTENSION, UNSPECIFIED TYPE: ICD-10-CM

## 2023-05-25 DIAGNOSIS — E51.9 THIAMINE DEFICIENCY: ICD-10-CM

## 2023-05-25 DIAGNOSIS — K59.09 CHRONIC CONSTIPATION: ICD-10-CM

## 2023-05-25 DIAGNOSIS — E78.5 HYPERLIPIDEMIA, UNSPECIFIED HYPERLIPIDEMIA TYPE: ICD-10-CM

## 2023-05-25 PROCEDURE — 3079F PR MOST RECENT DIASTOLIC BLOOD PRESSURE 80-89 MM HG: ICD-10-PCS | Mod: CPTII,95,, | Performed by: NURSE PRACTITIONER

## 2023-05-25 PROCEDURE — G0439 PPPS, SUBSEQ VISIT: HCPCS | Mod: 95,,, | Performed by: NURSE PRACTITIONER

## 2023-05-25 PROCEDURE — 3288F PR FALLS RISK ASSESSMENT DOCUMENTED: ICD-10-PCS | Mod: CPTII,95,, | Performed by: NURSE PRACTITIONER

## 2023-05-25 PROCEDURE — 1125F PR PAIN SEVERITY QUANTIFIED, PAIN PRESENT: ICD-10-PCS | Mod: CPTII,95,, | Performed by: NURSE PRACTITIONER

## 2023-05-25 PROCEDURE — 1170F FXNL STATUS ASSESSED: CPT | Mod: CPTII,95,, | Performed by: NURSE PRACTITIONER

## 2023-05-25 PROCEDURE — 3288F FALL RISK ASSESSMENT DOCD: CPT | Mod: CPTII,95,, | Performed by: NURSE PRACTITIONER

## 2023-05-25 PROCEDURE — 1160F RVW MEDS BY RX/DR IN RCRD: CPT | Mod: CPTII,95,, | Performed by: NURSE PRACTITIONER

## 2023-05-25 PROCEDURE — 1170F PR FUNCTIONAL STATUS ASSESSED: ICD-10-PCS | Mod: CPTII,95,, | Performed by: NURSE PRACTITIONER

## 2023-05-25 PROCEDURE — 1159F PR MEDICATION LIST DOCUMENTED IN MEDICAL RECORD: ICD-10-PCS | Mod: CPTII,95,, | Performed by: NURSE PRACTITIONER

## 2023-05-25 PROCEDURE — 1125F AMNT PAIN NOTED PAIN PRSNT: CPT | Mod: CPTII,95,, | Performed by: NURSE PRACTITIONER

## 2023-05-25 PROCEDURE — 1101F PT FALLS ASSESS-DOCD LE1/YR: CPT | Mod: CPTII,95,, | Performed by: NURSE PRACTITIONER

## 2023-05-25 PROCEDURE — 3079F DIAST BP 80-89 MM HG: CPT | Mod: CPTII,95,, | Performed by: NURSE PRACTITIONER

## 2023-05-25 PROCEDURE — 3008F PR BODY MASS INDEX (BMI) DOCUMENTED: ICD-10-PCS | Mod: CPTII,95,, | Performed by: NURSE PRACTITIONER

## 2023-05-25 PROCEDURE — 1159F MED LIST DOCD IN RCRD: CPT | Mod: CPTII,95,, | Performed by: NURSE PRACTITIONER

## 2023-05-25 PROCEDURE — 1160F PR REVIEW ALL MEDS BY PRESCRIBER/CLIN PHARMACIST DOCUMENTED: ICD-10-PCS | Mod: CPTII,95,, | Performed by: NURSE PRACTITIONER

## 2023-05-25 PROCEDURE — G0439 PR MEDICARE ANNUAL WELLNESS SUBSEQUENT VISIT: ICD-10-PCS | Mod: 95,,, | Performed by: NURSE PRACTITIONER

## 2023-05-25 PROCEDURE — 1101F PR PT FALLS ASSESS DOC 0-1 FALLS W/OUT INJ PAST YR: ICD-10-PCS | Mod: CPTII,95,, | Performed by: NURSE PRACTITIONER

## 2023-05-25 PROCEDURE — 3075F PR MOST RECENT SYSTOLIC BLOOD PRESS GE 130-139MM HG: ICD-10-PCS | Mod: CPTII,95,, | Performed by: NURSE PRACTITIONER

## 2023-05-25 PROCEDURE — 3008F BODY MASS INDEX DOCD: CPT | Mod: CPTII,95,, | Performed by: NURSE PRACTITIONER

## 2023-05-25 PROCEDURE — 3075F SYST BP GE 130 - 139MM HG: CPT | Mod: CPTII,95,, | Performed by: NURSE PRACTITIONER

## 2023-05-25 NOTE — PROGRESS NOTES
"The patient location is: [place]  The chief complaint leading to consultation is:  Medicare AWV    Visit type: audiovisual    Face to Face time with patient: 40 min  60  minutes of total time spent on the encounter, which includes face to face time and non-face to face time preparing to see the patient (eg, review of tests), Obtaining and/or reviewing separately obtained history, Documenting clinical information in the electronic or other health record, Independently interpreting results (not separately reported) and communicating results to the patient/family/caregiver, or Care coordination (not separately reported).         Each patient to whom he or she provides medical services by telemedicine is:  (1) informed of the relationship between the physician and patient and the respective role of any other health care provider with respect to management of the patient; and (2) notified that he or she may decline to receive medical services by telemedicine and may withdraw from such care at any time.    Notes:       Kailey Stokes presented for a  Medicare AWV and comprehensive Health Risk Assessment today. The following components were reviewed and updated:    Medical history  Family History  Social history  Allergies and Current Medications  Health Risk Assessment  Health Maintenance  Care Team         ** See Completed Assessments for Annual Wellness Visit within the encounter summary.**         The following assessments were completed:  Living Situation  CAGE  Depression Screening  Fall Risk Assessment (MACH 10)  Hearing Assessment(HHI)  Cognitive Function Screening  Nutrition Screening  ADL Screening  PAQ Screening      Vitals:    05/25/23 1325   BP: 130/80   Weight: 86.2 kg (190 lb)   Height: 5' 9" (1.753 m)     Body mass index is 28.06 kg/m².  Physical Exam  Constitutional:       General: She is not in acute distress.     Appearance: Normal appearance. She is not ill-appearing, toxic-appearing or diaphoretic. "   Pulmonary:      Effort: Pulmonary effort is normal.   Neurological:      Mental Status: She is alert and oriented to person, place, and time.   Psychiatric:         Mood and Affect: Mood normal.         Behavior: Behavior normal.             Diagnoses and health risks identified today and associated recommendations/orders:    1. Encounter for preventive health examination  Screenings performed, as noted above.  Personal preventative testing needs reviewed.      2. Encounter for Medicare annual wellness exam  Screenings performed, as noted above.  Personal preventative testing needs reviewed.    - Ambulatory Referral/Consult to Enhanced Annual Wellness Visit (eAWV)    3. Aortic calcification  Monitored, stable, on a statin, cont tx    4. Hyperlipidemia, unspecified hyperlipidemia type  Treated with vascepa, crestor, stable, cont tx    5. Hypertension, unspecified type  Monitored, stable, cont to follow up with pcp, dig med    6. Stage 3b chronic kidney disease  Monitored, stable, follow up with pcp, last GFR 44.2    7. Thiamine deficiency  Treated with oral thiamine, stable, cont tx    8. Chronic constipation  Treated with Linsess, scheduled for a colonoscopy soon, follow up with pcp    9. Gastroesophageal reflux disease, unspecified whether esophagitis present  Treated with Protonix, stable, cont tx    10. Persistent mood (affective disorder), unspecified  Treated with Klonopin, stable, cont tx    Review for Substance Use Disorders: patient does use substances per chart  - Klonopin  Review for opioid screening: Patient is not prescribed opioids       Provided Kailey with a 5-10 year written screening schedule and personal prevention plan. Recommendations were developed using the USPSTF age appropriate recommendations. Education, counseling, and referrals were provided as needed. After Visit Summary printed and given to patient which includes a list of additional screenings\tests needed.    No follow-ups on  file.    Amilcar Mott, NP  I offered to discuss advanced care planning, including how to pick a person who would make decisions for you if you were unable to make them for yourself, called a health care power of , and what kind of decisions you might make such as use of life sustaining treatments such as ventilators and tube feeding when faced with a life limiting illness recorded on a living will that they will need to know. (How you want to be cared for as you near the end of your natural life)     X Patient is interested in learning more about how to make advanced directives.  I provided them paperwork and offered to discuss this with them.

## 2023-05-25 NOTE — TELEPHONE ENCOUNTER
Called pt; informed pt I was just making a reminder call for pt's virtual visit today at 1:00pm and to see if pt needed any help; pt stated didn't need any help; pt informed to login 10 minutes prior to appt time

## 2023-05-25 NOTE — PATIENT INSTRUCTIONS
Counseling and Referral of Other Preventative  (Italic type indicates deductible and co-insurance are waived)    Patient Name: Kailey Stokes  Today's Date: 5/25/2023    Health Maintenance       Date Due Completion Date    Lipid Panel 08/11/2023 8/11/2022    Colorectal Cancer Screening 10/18/2023 10/18/2018    Mammogram 02/09/2024 2/9/2023    High Dose Statin 05/16/2024 5/16/2023    DEXA Scan 12/26/2024 12/26/2019    Hemoglobin A1c (Diabetic Prevention Screening) 08/11/2025 8/11/2022    TETANUS VACCINE 01/24/2027 1/24/2017        No orders of the defined types were placed in this encounter.    The following information is provided to all patients.  This information is to help you find resources for any of the problems found today that may be affecting your health:                Living healthy guide: www.[facility].[place].gov      Understanding Diabetes: www.diabetes.org      Eating healthy: www.cdc.gov/healthyweight      [facility] home safety checklist: www.cdc.gov/steadi/patient.html      Agency on Aging: www.goea.[place].[facility]      Alcoholics anonymous (AA): www.aa.org      Physical Activity: www.katy.nih.gov/sf8lsaw      Tobacco use: www.quitwithusla.org

## 2023-05-31 ENCOUNTER — ANESTHESIA EVENT (OUTPATIENT)
Dept: ENDOSCOPY | Facility: HOSPITAL | Age: 73
End: 2023-05-31
Payer: MEDICARE

## 2023-05-31 NOTE — ANESTHESIA PREPROCEDURE EVALUATION
05/31/2023  Kailey Stokes is a 72 y.o., female.  Normal sinus rhythm   Nonspecific ST and T wave abnormality   Abnormal ECG   When compared with ECG of 23-AUG-2017 08:42,   T wave inversion now evident in Anterior leads   Confirmed by BISI IVY MD ([id number]) on 8/18/2022 6:51:30 PM   Past Medical History:   Diagnosis Date    Abnormal Pap smear     Anxiety     Dr. Bose    Breast disorder     Pain in left breast    Family history of colonic polyps 10/18/2018    Her brother and sister have both had colon polyps.    Hyperlipidemia     Hypertension     Nerve damage     legs    Persistent mood (affective) disorder, unspecified 2/14/2023     Past Surgical History:   Procedure Laterality Date    COLONOSCOPY N/A 10/18/2018    Procedure: COLONOSCOPY;  Surgeon: Jayjay Grier MD;  [facility];  Service: Endoscopy;  Laterality: N/A;    HYSTERECTOMY      KNEE SURGERY      TOTAL ABDOMINAL HYSTERECTOMY W/ BILATERAL SALPINGOOPHORECTOMY       Current Outpatient Medications   Medication Instructions    acetaminophen (TYLENOL) 500 mg Cap Oral    ascorbic acid (vitamin C) (VITAMIN C) 1,000 mg, Oral    aspirin (ECOTRIN) 81 mg, Oral, Daily    cholecalciferol (vitamin D3) 5,000 Units, Oral    clonazePAM (KLONOPIN) 0.5 MG tablet TAKE 1 TABLET(0.5 MG) BY MOUTH DAILY AS NEEDED FOR ANXIETY    estradioL (ESTRACE) 0.5 mg, Oral, Daily    fluocinolone (DERMA-SMOOTHE) 0.01 % external oil Apply oil to scalp once a day    fluticasone propionate (FLONASE) 50 mcg/actuation nasal spray SHAKE LIQUID AND USE 2 SPRAYS(100 MCG) IN EACH NOSTRIL EVERY DAY    Lactobac no.41/Bifidobact no.7 (PROBIOTIC-10 ORAL) Oral, [facility] of [facility]    linaclotide (LINZESS ORAL) 290 mg, Oral    mometasone (ELOCON) 0.1 % solution Apply to scalp once daily    pantoprazole (PROTONIX) 40 MG tablet TAKE 1 TABLET(40 MG) BY MOUTH EVERY  DAY    rosuvastatin (CRESTOR) 40 mg, Oral, Nightly    vitamin B-1 500 mg, Oral, Daily             Pre-op Assessment    I have reviewed the Patient Summary Reports.     I have reviewed the Nursing Notes. I have reviewed the NPO Status.   I have reviewed the Medications.     Review of Systems  Anesthesia Hx:  No problems with previous Anesthesia  Neg history of prior surgery. Denies Family Hx of Anesthesia complications.   Denies Personal Hx of Anesthesia complications.   Social:  Non-Smoker, Social Alcohol Use    Cardiovascular:   Hypertension hyperlipidemia    Renal/:   Chronic Renal Disease    Hepatic/GI:   GERD, well controlled    Neurological:   Headaches    Psych:   Psychiatric History anxiety          Physical Exam  General: Alert and Oriented    Airway:  Mallampati: II / II  Mouth Opening: Normal  TM Distance: Normal  Tongue: Normal  Neck ROM: Normal ROM    Dental:  Intact    Chest/Lungs:  Clear to auscultation, Normal Respiratory Rate    Heart:  Rate: Normal  Rhythm: Regular Rhythm        Anesthesia Plan  Type of Anesthesia, risks & benefits discussed:    Anesthesia Type: Gen Natural Airway  Intra-op Monitoring Plan: Standard ASA Monitors  Post Op Pain Control Plan: multimodal analgesia  Induction:  IV  Informed Consent: Informed consent signed with the Patient and all parties understand the risks and agree with anesthesia plan.  All questions answered.   ASA Score: 2  Day of Surgery Review of History & Physical: H&P Update referred to the surgeon/provider.    Ready For Surgery From Anesthesia Perspective.     .

## 2023-06-01 ENCOUNTER — HOSPITAL ENCOUNTER (OUTPATIENT)
Facility: HOSPITAL | Age: 73
Discharge: HOME | End: 2023-06-01
Attending: INTERNAL MEDICINE | Admitting: INTERNAL MEDICINE
Payer: MEDICARE

## 2023-06-01 ENCOUNTER — ANESTHESIA (OUTPATIENT)
Dept: ENDOSCOPY | Facility: HOSPITAL | Age: 73
End: 2023-06-01
Payer: MEDICARE

## 2023-06-01 VITALS
RESPIRATION RATE: 15 BRPM | WEIGHT: 185.19 LBS | SYSTOLIC BLOOD PRESSURE: 137 MMHG | HEIGHT: 69 IN | OXYGEN SATURATION: 100 % | HEART RATE: 71 BPM | DIASTOLIC BLOOD PRESSURE: 72 MMHG | BODY MASS INDEX: 27.43 KG/M2 | TEMPERATURE: 98 F

## 2023-06-01 DIAGNOSIS — K63.5 COLON POLYPS: ICD-10-CM

## 2023-06-01 DIAGNOSIS — K63.5 POLYP OF COLON, UNSPECIFIED PART OF COLON, UNSPECIFIED TYPE: Primary | ICD-10-CM

## 2023-06-01 PROCEDURE — 63600175 PHARM REV CODE 636 W HCPCS: Performed by: NURSE ANESTHETIST, CERTIFIED REGISTERED

## 2023-06-01 PROCEDURE — G0105 COLORECTAL SCRN; HI RISK IND: HCPCS | Mod: ,,, | Performed by: INTERNAL MEDICINE

## 2023-06-01 PROCEDURE — D9220A PRA ANESTHESIA: ICD-10-PCS | Mod: ,,, | Performed by: NURSE ANESTHETIST, CERTIFIED REGISTERED

## 2023-06-01 PROCEDURE — D9220A PRA ANESTHESIA: Mod: ,,, | Performed by: NURSE ANESTHETIST, CERTIFIED REGISTERED

## 2023-06-01 PROCEDURE — G0105 COLORECTAL SCRN; HI RISK IND: HCPCS | Performed by: INTERNAL MEDICINE

## 2023-06-01 PROCEDURE — 63600175 PHARM REV CODE 636 W HCPCS: Performed by: INTERNAL MEDICINE

## 2023-06-01 PROCEDURE — G0105 COLORECTAL SCRN; HI RISK IND: ICD-10-PCS | Mod: ,,, | Performed by: INTERNAL MEDICINE

## 2023-06-01 PROCEDURE — 25000003 PHARM REV CODE 250: Performed by: INTERNAL MEDICINE

## 2023-06-01 PROCEDURE — 37000008 HC ANESTHESIA 1ST 15 MINUTES: Performed by: INTERNAL MEDICINE

## 2023-06-01 PROCEDURE — 37000009 HC ANESTHESIA EA ADD 15 MINS: Performed by: INTERNAL MEDICINE

## 2023-06-01 PROCEDURE — 25000003 PHARM REV CODE 250: Performed by: NURSE ANESTHETIST, CERTIFIED REGISTERED

## 2023-06-01 RX ORDER — LIDOCAINE HYDROCHLORIDE 20 MG/ML
INJECTION, SOLUTION EPIDURAL; INFILTRATION; INTRACAUDAL; PERINEURAL
Status: DISCONTINUED | OUTPATIENT
Start: 2023-06-01 | End: 2023-06-01

## 2023-06-01 RX ORDER — PROPOFOL 10 MG/ML
VIAL (ML) INTRAVENOUS
Status: DISCONTINUED | OUTPATIENT
Start: 2023-06-01 | End: 2023-06-01

## 2023-06-01 RX ORDER — DEXTROMETHORPHAN/PSEUDOEPHED 2.5-7.5/.8
DROPS ORAL
Status: DISCONTINUED | OUTPATIENT
Start: 2023-06-01 | End: 2023-06-01 | Stop reason: HOSPADM

## 2023-06-01 RX ORDER — SODIUM CHLORIDE, SODIUM LACTATE, POTASSIUM CHLORIDE, CALCIUM CHLORIDE 600; 310; 30; 20 MG/100ML; MG/100ML; MG/100ML; MG/100ML
INJECTION, SOLUTION INTRAVENOUS CONTINUOUS
Status: DISCONTINUED | OUTPATIENT
Start: 2023-06-01 | End: 2023-06-05 | Stop reason: HOSPADM

## 2023-06-01 RX ADMIN — PROPOFOL 100 MG: 10 INJECTION, EMULSION INTRAVENOUS at 11:06

## 2023-06-01 RX ADMIN — LIDOCAINE HYDROCHLORIDE 40 MG: 20 INJECTION, SOLUTION EPIDURAL; INFILTRATION; INTRACAUDAL; PERINEURAL at 11:06

## 2023-06-01 RX ADMIN — PROPOFOL 30 MG: 10 INJECTION, EMULSION INTRAVENOUS at 11:06

## 2023-06-01 RX ADMIN — SODIUM CHLORIDE, SODIUM LACTATE, POTASSIUM CHLORIDE, AND CALCIUM CHLORIDE: 600; 310; 30; 20 INJECTION, SOLUTION INTRAVENOUS at 11:06

## 2023-06-01 RX ADMIN — PROPOFOL 50 MG: 10 INJECTION, EMULSION INTRAVENOUS at 11:06

## 2023-06-01 NOTE — PROVATION PATIENT INSTRUCTIONS
Discharge Summary/Instructions after an Endoscopic Procedure  Patient Name: Kailey Stokes  Patient MRN: [id number]  Patient YOB: 1950 Thursday, June 1, 2023  Kel Hilliard MD  Dear patient,  As a result of recent federal legislation (The Federal Cures Act), you may   receive lab or pathology results from your procedure in your [facility]   account before your physician is able to contact you. Your physician or   their representative will relay the results to you with their   recommendations at their soonest availability.  Thank you,  RESTRICTIONS:  During your procedure today, you received medications for sedation.  These   medications may affect your judgment, balance and coordination.  Therefore,   for 24 hours, you have the following restrictions:   - DO NOT drive a car, operate machinery, make legal/financial decisions,   sign important papers or drink alcohol.    ACTIVITY:  Today: no heavy lifting, straining or running due to procedural   sedation/anesthesia.  The following day: return to full activity including work.  DIET:  Eat and drink normally unless instructed otherwise.     TREATMENT FOR COMMON SIDE EFFECTS:  - Mild abdominal pain, nausea, belching, bloating or excessive gas:  rest,   eat lightly and use a heating pad.  - Sore Throat: treat with throat lozenges and/or gargle with warm salt   water.  - Because air was used during the procedure, expelling large amounts of air   from your rectum or belching is normal.  - If a bowel prep was taken, you may not have a bowel movement for 1-3 days.    This is normal.  SYMPTOMS TO WATCH FOR AND REPORT TO YOUR PHYSICIAN:  1. Abdominal pain or bloating, other than gas cramps.  2. Chest pain.  3. Back pain.  4. Signs of infection such as: chills or fever occurring within 24 hours   after the procedure.  5. Rectal bleeding, which would show as bright red, maroon, or black stools.   (A tablespoon of blood from the rectum is not serious, especially  if   hemorrhoids are present.)  6. Vomiting.  7. Weakness or dizziness.  GO DIRECTLY TO THE NEAREST EMERGENCY ROOM IF YOU HAVE ANY OF THE FOLLOWING:      Difficulty breathing              Chills and/or fever over 101 F   Persistent vomiting and/or vomiting blood   Severe abdominal pain   Severe chest pain   Black, tarry stools   Bleeding- more than one tablespoon   Any other symptom or condition that you feel may need urgent attention  Your doctor recommends these additional instructions:  If any biopsies were taken, your doctors clinic will contact you in 1 to 2   weeks with any results.  - Discharge patient to home.   - Resume previous diet.   - Continue present medications.   - Repeat colonoscopy in 5 years for surveillance.   - Return to referring physician.   - Patient has a contact number available for emergencies.  The signs and   symptoms of potential delayed complications were discussed with the   patient.  Return to normal activities tomorrow.  Written discharge   instructions were provided to the patient.  For questions, problems or results please call your physician Kel Hilliard MD at Work:  [phone]  If you have any questions about the above instructions, call the GI   department at [phone] or call the endoscopy unit at [phone]   from 7am until 3 pm.  [facility] [place], EMERGENCY ROOM PHONE NUMBER:   [phone]  IF A COMPLICATION OR EMERGENCY SITUATION ARISES AND YOU ARE UNABLE TO REACH   YOUR PHYSICIAN - GO DIRECTLY TO THE EMERGENCY ROOM.  I have read or have had read to me these discharge instructions for my   procedure and have received a written copy.  I understand these   instructions and will follow-up with my physician if I have any questions.     __________________________________       _____________________________________  Nurse Signature                                          Patient/Designated   Responsible Party Signature  Kel ROMO  MD Babak  6/1/2023 11:36:57 AM  This report has been verified and signed electronically.  Dear patient,  As a result of recent federal legislation (The Federal Cures Act), you may   receive lab or pathology results from your procedure in your [facility]   account before your physician is able to contact you. Your physician or   their representative will relay the results to you with their   recommendations at their soonest availability.  Thank you,  PROVATION

## 2023-06-01 NOTE — H&P
PRE PROCEDURE H&P    Patient Name: Kailey Stokes  MRN: [id number]  : 1950  Date of Procedure:  2023  Referring Physician: Kel Hilliard MD  Primary Physician: Karel Flowers MD  Procedure Physician: Kel Hilliard MD       Planned Procedure: Colonoscopy  Diagnosis: previous adenomatous polyp  Chief Complaint: Same as above    HPI: Patient is an 72 y.o. female is here for the above.     Last colonoscopy:   Family history: None   Anticoagulation: None     Past Medical History:   Past Medical History:   Diagnosis Date    Abnormal Pap smear     Anxiety     Dr. Bose    Breast disorder     Pain in left breast    Family history of colonic polyps 10/18/2018    Her brother and sister have both had colon polyps.    Hyperlipidemia     Hypertension     Nerve damage     legs    Persistent mood (affective) disorder, unspecified 2023        Past Surgical History:  Past Surgical History:   Procedure Laterality Date    COLONOSCOPY N/A 10/18/2018    Procedure: COLONOSCOPY;  Surgeon: Jayjay Grier MD;  Location: [facility];  Service: Endoscopy;  Laterality: N/A;    HYSTERECTOMY      KNEE SURGERY      TOTAL ABDOMINAL HYSTERECTOMY W/ BILATERAL SALPINGOOPHORECTOMY          Home Medications:  Prior to Admission medications    Medication Sig Start Date End Date Taking? Authorizing Provider   aspirin (ECOTRIN) 81 MG EC tablet Take 81 mg by mouth once daily.   Yes Historical Provider   clonazePAM (KLONOPIN) 0.5 MG tablet TAKE 1 TABLET(0.5 MG) BY MOUTH DAILY AS NEEDED FOR ANXIETY 23  Yes Karel Flowers MD   rosuvastatin (CRESTOR) 40 MG Tab Take 1 tablet (40 mg total) by mouth every evening. 22 Yes Karel Flowers MD   acetaminophen (TYLENOL) 500 mg Cap Take by mouth.    Historical Provider   ascorbic acid, vitamin C, (VITAMIN C) 1000 MG tablet Take 1,000 mg by mouth.    Historical Provider   cholecalciferol, vitamin D3, 125 mcg (5,000 unit) capsule Take 5,000 Units by mouth.     Historical Provider   estradioL (ESTRACE) 0.5 MG tablet Take 1 tablet (0.5 mg total) by mouth once daily. 2/2/23   Katherin Ames MD   fluocinolone (DERMA-SMOOTHE) 0.01 % external oil Apply oil to scalp once a day 4/19/21 4/19/22  Pat Ureña MD   fluticasone propionate (FLONASE) 50 mcg/actuation nasal spray SHAKE LIQUID AND USE 2 SPRAYS(100 MCG) IN EACH NOSTRIL EVERY DAY 12/27/21   Lucy Jeong, [facility]   Lactobac no.41/Bifidobact no.7 (PROBIOTIC-10 ORAL) Take by mouth. [facility] of life    Historical Provider   linaclotide (LINZESS ORAL) Take 290 mg by mouth.    Historical Provider   mometasone (ELOCON) 0.1 % solution Apply to scalp once daily 4/17/23   Pat Ureña MD   pantoprazole (PROTONIX) 40 MG tablet TAKE 1 TABLET(40 MG) BY MOUTH EVERY DAY 12/19/22   Karel Jose Flowers MD   thiamine HCl (VITAMIN B-1) 500 MG tablet Take 500 mg by mouth once daily.    Historical Provider        Allergies:  Review of patient's allergies indicates:   Allergen Reactions    Bromocriptine      Other reaction(s): nightmares  Other reaction(s): anixety  Other reaction(s): nightmares  Other reaction(s): anixety  Other reaction(s): nightmares  Other reaction(s): anixety    Codeine      Other reaction(s): Headache    Nitroglycerin Rash        Social History:   Social History     Socioeconomic History    Marital status:     Number of children: 1   Occupational History    Occupation: Retired   Tobacco Use    Smoking status: Never    Smokeless tobacco: Never   Substance and Sexual Activity    Alcohol use: Not Currently     Comment: occ use    Drug use: No    Sexual activity: Not Currently     Partners: Male     Birth control/protection: See Surgical Hx   Other Topics Concern    Are you pregnant or think you may be? No    Breast-feeding No     Social Determinants of Health     Financial Resource Strain: Low Risk     Difficulty of Paying Living Expenses: Not hard at all   Food Insecurity: No Food Insecurity    Worried About  "Running Out of Food in the Last Year: Never true    Ran Out of Food in the Last Year: Never true   Transportation Needs: No Transportation Needs    Lack of Transportation (Medical): No    Lack of Transportation (Non-Medical): No   Physical Activity: Insufficiently Active    Days of Exercise per Week: 3 days    Minutes of Exercise per Session: 30 min   Stress: No Stress Concern Present    Feeling of Stress : Only a little   Social Connections: Moderately Integrated    Frequency of Communication with Friends and Family: More than three times a week    Frequency of Social Gatherings with Friends and Family: More than three times a week    Attends [employer] Services: More than 4 times per year    Active Member of Clubs or Organizations: Yes    Attends Club or Organization Meetings: More than 4 times per year    Marital Status:    Housing Stability: Low Risk     Unable to Pay for Housing in the Last Year: No    Number of Places Lived in the Last Year: 1    Unstable Housing in the Last Year: No       Family History:  Family History   Problem Relation Age of Onset    Diabetes Mother     Glaucoma Mother     Hypertension Father     Heart disease Father     Arthritis Father     Diabetes Sister     Glaucoma Sister     Lymphoma Sister     Diabetes Brother     Breast cancer Maternal Aunt     Cancer Sister     COPD Brother     Kidney disease Brother        ROS: No acute cardiac events, no acute respiratory complaints.     Physical Exam (all patients):    /69 (BP Location: Right arm, Patient Position: Sitting)   Pulse 91   Temp 97.9 °F (36.6 °C) (Temporal)   Resp 17   Ht 5' 9" (1.753 m)   Wt 84 kg (185 lb 3 oz)   SpO2 98%   Breastfeeding No   BMI 27.35 kg/m²   Lungs: Clear to auscultation bilaterally, respirations unlabored  Heart: Regular rate and rhythm, S1 and S2 normal, no obvious murmurs  Abdomen:         Soft, non-tender, bowel sounds normal, no masses, no organomegaly    Lab Results   Component Value " Date    WBC 5.85 08/11/2022    MCV 85 08/11/2022    RDW 13.7 08/11/2022     08/11/2022    INR 1.0 09/23/2019    GLU 95 08/11/2022    HGBA1C 5.7 (H) 08/11/2022    BUN 11 08/11/2022     08/11/2022    K 4.1 08/11/2022     08/11/2022        SEDATION PLAN: per anesthesia      History reviewed, vital signs satisfactory, cardiopulmonary status satisfactory, sedation options, risks and plans have been discussed with the patient  All their questions were answered and the patient agrees to the sedation procedures as planned and the patient is deemed an appropriate candidate for the sedation as planned.    Procedure explained to patient, informed consent obtained and placed in chart.    Kel Hilliard  6/1/2023  11:15 AM

## 2023-06-01 NOTE — PLAN OF CARE
Discharge instructions reviewed with pt and daughter, handouts given, verbalized understanding with no further questions at this time. Dr. Hilliard spoke to pt at bedside, reviewed procedure and answered questions with MD telephone number provided per AVS sheet. VSS on RA, no pain or nausea noted, tolerating po fluids without difficulty, no other complaints noted. Fall precautions reviewed, consents in chart, PIV to be removed at discharge.

## 2023-06-01 NOTE — TRANSFER OF CARE
"Anesthesia Transfer of Care Note    Patient: Kailey Stokes    Procedure(s) Performed: Procedure(s) (LRB):  COLONOSCOPY (N/A)    Patient location: PACU    Anesthesia Type: general    Transport from OR: Transported from OR on room air with adequate spontaneous ventilation    Post pain: adequate analgesia    Post assessment: no apparent anesthetic complications and tolerated procedure well    Post vital signs: stable    Level of consciousness: awake    Nausea/Vomiting: no nausea/vomiting    Complications: none    Transfer of care protocol was followed      Last vitals:   Visit Vitals  /69 (BP Location: Right arm, Patient Position: Sitting)   Pulse 91   Temp 36.6 °C (97.9 °F) (Temporal)   Resp 17   Ht 5' 9" (1.753 m)   Wt 84 kg (185 lb 3 oz)   SpO2 98%   Breastfeeding No   BMI 27.35 kg/m²     "

## 2023-06-01 NOTE — ANESTHESIA POSTPROCEDURE EVALUATION
Anesthesia Post Evaluation    Patient: Kailey Stokes    Procedure(s) Performed: Procedure(s) (LRB):  COLONOSCOPY (N/A)    Final Anesthesia Type: general      Patient location during evaluation: PACU  Patient participation: Yes- Able to Participate  Level of consciousness: awake  Post-procedure vital signs: reviewed and stable  Pain management: adequate  Airway patency: patent    PONV status at discharge: No PONV  Anesthetic complications: no      Cardiovascular status: stable  Respiratory status: unassisted  Hydration status: euvolemic  Follow-up not needed.          Vitals Value Taken Time   /71 06/01/23 1151   Temp 36.3 °C (97.4 °F) 06/01/23 1135   Pulse 77 06/01/23 1151   Resp 17 06/01/23 1151   SpO2 100 % 06/01/23 1151         No case tracking events are documented in the log.      Pain/Micaela Score: Micaela Score: 9 (6/1/2023 11:35 AM)

## 2023-07-05 ENCOUNTER — OFFICE VISIT (OUTPATIENT)
Dept: INTERNAL MEDICINE | Facility: CLINIC | Age: 73
End: 2023-07-05
Payer: MEDICARE

## 2023-07-05 VITALS
DIASTOLIC BLOOD PRESSURE: 68 MMHG | HEIGHT: 69 IN | SYSTOLIC BLOOD PRESSURE: 104 MMHG | TEMPERATURE: 98 F | HEART RATE: 91 BPM | BODY MASS INDEX: 27.62 KG/M2 | WEIGHT: 186.5 LBS | OXYGEN SATURATION: 97 %

## 2023-07-05 DIAGNOSIS — M54.50 ACUTE LEFT-SIDED LOW BACK PAIN WITHOUT SCIATICA: Primary | ICD-10-CM

## 2023-07-05 DIAGNOSIS — N18.32 STAGE 3B CHRONIC KIDNEY DISEASE: ICD-10-CM

## 2023-07-05 DIAGNOSIS — K59.09 CHRONIC CONSTIPATION: ICD-10-CM

## 2023-07-05 LAB
BACTERIA #/AREA URNS AUTO: ABNORMAL /HPF
BILIRUB UR QL STRIP: NEGATIVE
CAOX CRY UR QL COMP ASSIST: ABNORMAL
CLARITY UR REFRACT.AUTO: ABNORMAL
COLOR UR AUTO: YELLOW
GLUCOSE UR QL STRIP: NEGATIVE
HGB UR QL STRIP: ABNORMAL
HYALINE CASTS UR QL AUTO: 0 /LPF
KETONES UR QL STRIP: ABNORMAL
LEUKOCYTE ESTERASE UR QL STRIP: NEGATIVE
MICROSCOPIC COMMENT: ABNORMAL
NITRITE UR QL STRIP: NEGATIVE
PH UR STRIP: 6 [PH] (ref 5–8)
PROT UR QL STRIP: ABNORMAL
RBC #/AREA URNS AUTO: 18 /HPF (ref 0–4)
SP GR UR STRIP: 1.02 (ref 1–1.03)
SQUAMOUS #/AREA URNS AUTO: 61 /HPF
URN SPEC COLLECT METH UR: ABNORMAL
WBC #/AREA URNS AUTO: >100 /HPF (ref 0–5)

## 2023-07-05 PROCEDURE — 81001 URINALYSIS AUTO W/SCOPE: CPT | Mod: HCNC | Performed by: NURSE PRACTITIONER

## 2023-07-05 PROCEDURE — 1101F PR PT FALLS ASSESS DOC 0-1 FALLS W/OUT INJ PAST YR: ICD-10-PCS | Mod: HCNC,CPTII,S$GLB, | Performed by: NURSE PRACTITIONER

## 2023-07-05 PROCEDURE — 1159F MED LIST DOCD IN RCRD: CPT | Mod: HCNC,CPTII,S$GLB, | Performed by: NURSE PRACTITIONER

## 2023-07-05 PROCEDURE — 3078F DIAST BP <80 MM HG: CPT | Mod: HCNC,CPTII,S$GLB, | Performed by: NURSE PRACTITIONER

## 2023-07-05 PROCEDURE — 3008F PR BODY MASS INDEX (BMI) DOCUMENTED: ICD-10-PCS | Mod: HCNC,CPTII,S$GLB, | Performed by: NURSE PRACTITIONER

## 2023-07-05 PROCEDURE — 1125F PR PAIN SEVERITY QUANTIFIED, PAIN PRESENT: ICD-10-PCS | Mod: HCNC,CPTII,S$GLB, | Performed by: NURSE PRACTITIONER

## 2023-07-05 PROCEDURE — 1125F AMNT PAIN NOTED PAIN PRSNT: CPT | Mod: HCNC,CPTII,S$GLB, | Performed by: NURSE PRACTITIONER

## 2023-07-05 PROCEDURE — 3074F PR MOST RECENT SYSTOLIC BLOOD PRESSURE < 130 MM HG: ICD-10-PCS | Mod: HCNC,CPTII,S$GLB, | Performed by: NURSE PRACTITIONER

## 2023-07-05 PROCEDURE — 3008F BODY MASS INDEX DOCD: CPT | Mod: HCNC,CPTII,S$GLB, | Performed by: NURSE PRACTITIONER

## 2023-07-05 PROCEDURE — 87086 URINE CULTURE/COLONY COUNT: CPT | Mod: HCNC | Performed by: NURSE PRACTITIONER

## 2023-07-05 PROCEDURE — 99999 PR PBB SHADOW E&M-EST. PATIENT-LVL IV: CPT | Mod: PBBFAC,HCNC,, | Performed by: NURSE PRACTITIONER

## 2023-07-05 PROCEDURE — 3288F PR FALLS RISK ASSESSMENT DOCUMENTED: ICD-10-PCS | Mod: HCNC,CPTII,S$GLB, | Performed by: NURSE PRACTITIONER

## 2023-07-05 PROCEDURE — 1160F PR REVIEW ALL MEDS BY PRESCRIBER/CLIN PHARMACIST DOCUMENTED: ICD-10-PCS | Mod: HCNC,CPTII,S$GLB, | Performed by: NURSE PRACTITIONER

## 2023-07-05 PROCEDURE — 99999 PR PBB SHADOW E&M-EST. PATIENT-LVL IV: ICD-10-PCS | Mod: PBBFAC,HCNC,, | Performed by: NURSE PRACTITIONER

## 2023-07-05 PROCEDURE — 99214 OFFICE O/P EST MOD 30 MIN: CPT | Mod: HCNC,S$GLB,, | Performed by: NURSE PRACTITIONER

## 2023-07-05 PROCEDURE — 1101F PT FALLS ASSESS-DOCD LE1/YR: CPT | Mod: HCNC,CPTII,S$GLB, | Performed by: NURSE PRACTITIONER

## 2023-07-05 PROCEDURE — 3288F FALL RISK ASSESSMENT DOCD: CPT | Mod: HCNC,CPTII,S$GLB, | Performed by: NURSE PRACTITIONER

## 2023-07-05 PROCEDURE — 3074F SYST BP LT 130 MM HG: CPT | Mod: HCNC,CPTII,S$GLB, | Performed by: NURSE PRACTITIONER

## 2023-07-05 PROCEDURE — 99214 PR OFFICE/OUTPT VISIT, EST, LEVL IV, 30-39 MIN: ICD-10-PCS | Mod: HCNC,S$GLB,, | Performed by: NURSE PRACTITIONER

## 2023-07-05 PROCEDURE — 1160F RVW MEDS BY RX/DR IN RCRD: CPT | Mod: HCNC,CPTII,S$GLB, | Performed by: NURSE PRACTITIONER

## 2023-07-05 PROCEDURE — 1159F PR MEDICATION LIST DOCUMENTED IN MEDICAL RECORD: ICD-10-PCS | Mod: HCNC,CPTII,S$GLB, | Performed by: NURSE PRACTITIONER

## 2023-07-05 PROCEDURE — 3078F PR MOST RECENT DIASTOLIC BLOOD PRESSURE < 80 MM HG: ICD-10-PCS | Mod: HCNC,CPTII,S$GLB, | Performed by: NURSE PRACTITIONER

## 2023-07-05 RX ORDER — BACLOFEN 10 MG/1
10 TABLET ORAL 2 TIMES DAILY
COMMUNITY
Start: 2023-06-26 | End: 2023-08-30

## 2023-07-05 NOTE — PROGRESS NOTES
"Subjective:       Patient ID: Kailey Stokes is a 73 y.o. female.    Chief Complaint: Back Pain and Flank Pain    Patient here for pain to left lower back radiating to left groin  Started around her bday last week  Admits to doing house work  Denies trauma, over use  No rash    Bp controlled  Linzess helping with constipation  Sees nephro for ckd    Back Pain  Pertinent negatives include no chest pain or fever.   Flank Pain  Pertinent negatives include no chest pain or fever.     /68   Pulse 91   Temp 98.4 °F (36.9 °C)   Ht 5' 9" (1.753 m)   Wt 84.6 kg (186 lb 8.2 oz)   SpO2 97%   BMI 27.54 kg/m²     Review of Systems   Constitutional:  Positive for activity change. Negative for appetite change, chills, diaphoresis, fatigue, fever and unexpected weight change.   HENT: Negative.     Respiratory:  Negative for cough and shortness of breath.    Cardiovascular:  Negative for chest pain, palpitations and leg swelling.   Gastrointestinal: Negative.    Genitourinary:  Positive for flank pain.   Musculoskeletal:  Positive for back pain.   Skin:  Negative for color change, pallor, rash and wound.   Allergic/Immunologic: Negative for immunocompromised state.   Neurological: Negative.  Negative for dizziness and facial asymmetry.   Hematological:  Negative for adenopathy. Does not bruise/bleed easily.   Psychiatric/Behavioral:  Negative for agitation, behavioral problems and confusion.      Objective:      Physical Exam  Vitals and nursing note reviewed.   Constitutional:       General: She is not in acute distress.     Appearance: Normal appearance. She is well-developed. She is not diaphoretic.   HENT:      Head: Normocephalic and atraumatic.   Cardiovascular:      Rate and Rhythm: Normal rate and regular rhythm.      Heart sounds: Normal heart sounds. No murmur heard.  Pulmonary:      Effort: Pulmonary effort is normal. No respiratory distress.      Breath sounds: Normal breath sounds.   Musculoskeletal:         " General: Normal range of motion.        Back:       Comments: TTP to area of pain per drawing.  No swelling, erythema or rash.  No numbness or tingling.  Negative straight leg raise bilaterally.     Skin:     General: Skin is warm and dry.      Findings: No rash.   Neurological:      Mental Status: She is alert.   Psychiatric:         Mood and Affect: Mood normal.         Behavior: Behavior normal. Behavior is cooperative.         Thought Content: Thought content normal.         Judgment: Judgment normal.       Assessment:       1. Acute left-sided low back pain without sciatica    2. Stage 3b chronic kidney disease    3. Chronic constipation        Plan:       Kailey was seen today for back pain and flank pain.    Diagnoses and all orders for this visit:    Acute left-sided low back pain without sciatica  -     Urinalysis, Reflex to Urine Culture Urine, Clean Catch    Stage 3b chronic kidney disease  Stable avoid nsaids    Chronic constipation  Stable on linzess    Patient Instructions   Ok for baclofen 1/2 tab as needed  Tylenol as needed  Biofreeze as needed  Heating pad

## 2023-07-06 ENCOUNTER — PATIENT MESSAGE (OUTPATIENT)
Dept: INTERNAL MEDICINE | Facility: CLINIC | Age: 73
End: 2023-07-06
Payer: MEDICARE

## 2023-07-07 LAB — BACTERIA UR CULT: NORMAL

## 2023-07-11 ENCOUNTER — OFFICE VISIT (OUTPATIENT)
Dept: INTERNAL MEDICINE | Facility: CLINIC | Age: 73
End: 2023-07-11
Payer: MEDICARE

## 2023-07-11 VITALS
WEIGHT: 184.31 LBS | BODY MASS INDEX: 27.22 KG/M2 | DIASTOLIC BLOOD PRESSURE: 70 MMHG | TEMPERATURE: 98 F | HEART RATE: 79 BPM | SYSTOLIC BLOOD PRESSURE: 104 MMHG

## 2023-07-11 DIAGNOSIS — R80.9 PROTEINURIA, UNSPECIFIED TYPE: ICD-10-CM

## 2023-07-11 DIAGNOSIS — F41.1 GAD (GENERALIZED ANXIETY DISORDER): Primary | ICD-10-CM

## 2023-07-11 DIAGNOSIS — R53.1 WEAKNESS: ICD-10-CM

## 2023-07-11 PROCEDURE — 99214 PR OFFICE/OUTPT VISIT, EST, LEVL IV, 30-39 MIN: ICD-10-PCS | Mod: HCNC,S$GLB,, | Performed by: FAMILY MEDICINE

## 2023-07-11 PROCEDURE — 3078F DIAST BP <80 MM HG: CPT | Mod: HCNC,CPTII,S$GLB, | Performed by: FAMILY MEDICINE

## 2023-07-11 PROCEDURE — 1126F PR PAIN SEVERITY QUANTIFIED, NO PAIN PRESENT: ICD-10-PCS | Mod: HCNC,CPTII,S$GLB, | Performed by: FAMILY MEDICINE

## 2023-07-11 PROCEDURE — 1126F AMNT PAIN NOTED NONE PRSNT: CPT | Mod: HCNC,CPTII,S$GLB, | Performed by: FAMILY MEDICINE

## 2023-07-11 PROCEDURE — 3078F PR MOST RECENT DIASTOLIC BLOOD PRESSURE < 80 MM HG: ICD-10-PCS | Mod: HCNC,CPTII,S$GLB, | Performed by: FAMILY MEDICINE

## 2023-07-11 PROCEDURE — 3074F PR MOST RECENT SYSTOLIC BLOOD PRESSURE < 130 MM HG: ICD-10-PCS | Mod: HCNC,CPTII,S$GLB, | Performed by: FAMILY MEDICINE

## 2023-07-11 PROCEDURE — 3074F SYST BP LT 130 MM HG: CPT | Mod: HCNC,CPTII,S$GLB, | Performed by: FAMILY MEDICINE

## 2023-07-11 PROCEDURE — 1159F MED LIST DOCD IN RCRD: CPT | Mod: HCNC,CPTII,S$GLB, | Performed by: FAMILY MEDICINE

## 2023-07-11 PROCEDURE — 99999 PR PBB SHADOW E&M-EST. PATIENT-LVL III: ICD-10-PCS | Mod: PBBFAC,HCNC,, | Performed by: FAMILY MEDICINE

## 2023-07-11 PROCEDURE — 3288F FALL RISK ASSESSMENT DOCD: CPT | Mod: HCNC,CPTII,S$GLB, | Performed by: FAMILY MEDICINE

## 2023-07-11 PROCEDURE — 3008F PR BODY MASS INDEX (BMI) DOCUMENTED: ICD-10-PCS | Mod: HCNC,CPTII,S$GLB, | Performed by: FAMILY MEDICINE

## 2023-07-11 PROCEDURE — 99999 PR PBB SHADOW E&M-EST. PATIENT-LVL III: CPT | Mod: PBBFAC,HCNC,, | Performed by: FAMILY MEDICINE

## 2023-07-11 PROCEDURE — 1159F PR MEDICATION LIST DOCUMENTED IN MEDICAL RECORD: ICD-10-PCS | Mod: HCNC,CPTII,S$GLB, | Performed by: FAMILY MEDICINE

## 2023-07-11 PROCEDURE — 3008F BODY MASS INDEX DOCD: CPT | Mod: HCNC,CPTII,S$GLB, | Performed by: FAMILY MEDICINE

## 2023-07-11 PROCEDURE — 1160F RVW MEDS BY RX/DR IN RCRD: CPT | Mod: HCNC,CPTII,S$GLB, | Performed by: FAMILY MEDICINE

## 2023-07-11 PROCEDURE — 1160F PR REVIEW ALL MEDS BY PRESCRIBER/CLIN PHARMACIST DOCUMENTED: ICD-10-PCS | Mod: HCNC,CPTII,S$GLB, | Performed by: FAMILY MEDICINE

## 2023-07-11 PROCEDURE — 1101F PT FALLS ASSESS-DOCD LE1/YR: CPT | Mod: HCNC,CPTII,S$GLB, | Performed by: FAMILY MEDICINE

## 2023-07-11 PROCEDURE — 1101F PR PT FALLS ASSESS DOC 0-1 FALLS W/OUT INJ PAST YR: ICD-10-PCS | Mod: HCNC,CPTII,S$GLB, | Performed by: FAMILY MEDICINE

## 2023-07-11 PROCEDURE — 99214 OFFICE O/P EST MOD 30 MIN: CPT | Mod: HCNC,S$GLB,, | Performed by: FAMILY MEDICINE

## 2023-07-11 PROCEDURE — 3288F PR FALLS RISK ASSESSMENT DOCUMENTED: ICD-10-PCS | Mod: HCNC,CPTII,S$GLB, | Performed by: FAMILY MEDICINE

## 2023-07-11 RX ORDER — ESCITALOPRAM OXALATE 10 MG/1
10 TABLET ORAL DAILY
Qty: 30 TABLET | Refills: 3 | Status: SHIPPED | OUTPATIENT
Start: 2023-07-11 | End: 2023-08-30 | Stop reason: SDUPTHER

## 2023-07-11 NOTE — PROGRESS NOTES
Subjective:      Patient ID: Kailey Stokes is a 73 y.o. female.    Chief Complaint: Hypotension    HPI  74 yo here with daughter for f/u.  Went to Er recently//dehydrated.  Given IV fluids.  BP running low at times, 80s/40s.  Off any BP meds.  Not taking SSRI//just klonopin PRN  Anxiety seems to be an issue  Worried about her CKD//saw nephro    Past Medical History:   Diagnosis Date    Abnormal Pap smear     Anxiety     Dr. Bose    Breast disorder     Pain in left breast    Family history of colonic polyps 10/18/2018    Her brother and sister have both had colon polyps.    Hyperlipidemia     Hypertension     Nerve damage     legs    Persistent mood (affective) disorder, unspecified 2/14/2023     Family History   Problem Relation Age of Onset    Diabetes Mother     Glaucoma Mother     Hypertension Father     Heart disease Father     Arthritis Father     Diabetes Sister     Glaucoma Sister     Lymphoma Sister     Diabetes Brother     Breast cancer Maternal Aunt     Cancer Sister     COPD Brother     Kidney disease Brother      Past Surgical History:   Procedure Laterality Date    COLONOSCOPY N/A 10/18/2018    Procedure: COLONOSCOPY;  Surgeon: Jayjay Grier MD;  Location: [facility];  Service: Endoscopy;  Laterality: N/A;    COLONOSCOPY N/A 6/1/2023    Procedure: COLONOSCOPY;  Surgeon: Kel Hilliard MD;  Location: [facility] ENDO;  Service: Endoscopy;  Laterality: N/A;    HYSTERECTOMY      KNEE SURGERY      TOTAL ABDOMINAL HYSTERECTOMY W/ BILATERAL SALPINGOOPHORECTOMY       Social History     Tobacco Use    Smoking status: Never    Smokeless tobacco: Never   Substance Use Topics    Alcohol use: Not Currently     Comment: occ use    Drug use: No       /70   Pulse 79   Temp 97.8 °F (36.6 °C) (Tympanic)   Wt 83.6 kg (184 lb 4.9 oz)   BMI 27.22 kg/m²     Review of Systems   Constitutional:  Negative for activity change, appetite change, chills, diaphoresis, fatigue, fever and unexpected weight change.    HENT:  Negative for ear pain, hearing loss, postnasal drip, rhinorrhea and tinnitus.    Eyes:  Negative for visual disturbance.   Respiratory:  Negative for cough, shortness of breath and wheezing.    Cardiovascular:  Negative for chest pain, palpitations and leg swelling.   Gastrointestinal:  Negative for abdominal distention.   Genitourinary:  Negative for dysuria, frequency, hematuria and urgency.   Musculoskeletal:  Negative for back pain and joint swelling.   Neurological:  Negative for weakness and headaches.   Hematological:  Negative for adenopathy.   Psychiatric/Behavioral:  Negative for confusion and decreased concentration. The patient is nervous/anxious.      Objective:     Physical Exam  Vitals and nursing note reviewed.   Constitutional:       General: She is not in acute distress.     Appearance: She is well-developed. She is not diaphoretic.   HENT:      Head: Normocephalic.   Eyes:      Conjunctiva/sclera: Conjunctivae normal.   Cardiovascular:      Rate and Rhythm: Normal rate and regular rhythm.   Pulmonary:      Effort: Pulmonary effort is normal. No tachypnea, accessory muscle usage or respiratory distress.   Musculoskeletal:      Cervical back: Neck supple.   Skin:     Coloration: Skin is not pale.   Neurological:      Mental Status: She is alert and oriented to person, place, and time.   Psychiatric:         Mood and Affect: Mood normal.         Behavior: Behavior normal.         Thought Content: Thought content normal.         Judgment: Judgment normal.       Lab Results   Component Value Date    WBC 5.85 08/11/2022    HGB 13.9 08/11/2022    HCT 41.1 08/11/2022     08/11/2022    CHOL 178 08/11/2022    TRIG 167 (H) 08/11/2022    HDL 53 08/11/2022    ALT 41 08/11/2022    AST 37 08/11/2022     08/11/2022    K 4.1 08/11/2022     08/11/2022    CREATININE 1.3 08/11/2022    BUN 11 08/11/2022    CO2 28 08/11/2022    TSH 2.193 08/11/2022    INR 1.0 09/23/2019    HGBA1C 5.7 (H)  08/11/2022       Assessment:     1. KRISTINE (generalized anxiety disorder)    2. Weakness    3. Proteinuria, unspecified type         Plan:     KRISTINE (generalized anxiety disorder)    Weakness    Proteinuria, unspecified type  -     Urinalysis; Future; Expected date: 08/07/2023    Other orders  -     EScitalopram oxalate (LEXAPRO) 10 MG tablet; Take 1 tablet (10 mg total) by mouth once daily.  Dispense: 30 tablet; Refill: 3      Reviewed records  Cont with 64 oz water daily//avoid NSAIDs  Add more salt days where bP drops//IV hydration  Start lexapro 10mg nightly//KRISTINE not controlled  Fu with me in Aug/add urine to recheck

## 2023-08-07 ENCOUNTER — LAB VISIT (OUTPATIENT)
Dept: LAB | Facility: HOSPITAL | Age: 73
End: 2023-08-07
Attending: FAMILY MEDICINE
Payer: MEDICARE

## 2023-08-07 DIAGNOSIS — R80.9 PROTEINURIA, UNSPECIFIED TYPE: ICD-10-CM

## 2023-08-07 LAB
BILIRUB UR QL STRIP: NEGATIVE
CLARITY UR REFRACT.AUTO: CLEAR
COLOR UR AUTO: YELLOW
GLUCOSE UR QL STRIP: NEGATIVE
HGB UR QL STRIP: NEGATIVE
KETONES UR QL STRIP: NEGATIVE
LEUKOCYTE ESTERASE UR QL STRIP: NEGATIVE
NITRITE UR QL STRIP: NEGATIVE
PH UR STRIP: 5 [PH] (ref 5–8)
PROT UR QL STRIP: NEGATIVE
SP GR UR STRIP: 1.01 (ref 1–1.03)
URN SPEC COLLECT METH UR: NORMAL

## 2023-08-07 PROCEDURE — 81003 URINALYSIS AUTO W/O SCOPE: CPT | Mod: HCNC | Performed by: FAMILY MEDICINE

## 2023-08-10 ENCOUNTER — PATIENT MESSAGE (OUTPATIENT)
Dept: ADMINISTRATIVE | Facility: OTHER | Age: 73
End: 2023-08-10
Payer: MEDICARE

## 2023-08-11 ENCOUNTER — TELEPHONE (OUTPATIENT)
Dept: INTERNAL MEDICINE | Facility: CLINIC | Age: 73
End: 2023-08-11
Payer: MEDICARE

## 2023-08-11 NOTE — TELEPHONE ENCOUNTER
----- Message from Irena Varela sent at 8/11/2023 10:42 AM CDT -----  Contact: Kailey Bonner is calling to speak to the nurse regarding a mistake she made through her patient portal, she rescheduled a appointment for the wrong time, she would like to know if she can get the appointment back that she canceled on 08/14 at 9:00am. If possible please schedule through her portal if not she will keep the one she have     Thanks  LJ

## 2023-08-11 NOTE — TELEPHONE ENCOUNTER
Called and spoke to pt. Informed pt that appt on august 14th has been taken. Pt states she will keep new appt time

## 2023-08-30 ENCOUNTER — OFFICE VISIT (OUTPATIENT)
Dept: INTERNAL MEDICINE | Facility: CLINIC | Age: 73
End: 2023-08-30
Payer: MEDICARE

## 2023-08-30 VITALS
HEART RATE: 72 BPM | BODY MASS INDEX: 28.08 KG/M2 | TEMPERATURE: 97 F | SYSTOLIC BLOOD PRESSURE: 126 MMHG | OXYGEN SATURATION: 97 % | DIASTOLIC BLOOD PRESSURE: 60 MMHG | WEIGHT: 189.63 LBS | HEIGHT: 69 IN

## 2023-08-30 DIAGNOSIS — R73.09 ABNORMAL GLUCOSE: ICD-10-CM

## 2023-08-30 DIAGNOSIS — I10 HYPERTENSION, UNSPECIFIED TYPE: ICD-10-CM

## 2023-08-30 DIAGNOSIS — E78.5 HYPERLIPIDEMIA, UNSPECIFIED HYPERLIPIDEMIA TYPE: ICD-10-CM

## 2023-08-30 DIAGNOSIS — Z00.00 ANNUAL PHYSICAL EXAM: Primary | ICD-10-CM

## 2023-08-30 PROCEDURE — 1160F PR REVIEW ALL MEDS BY PRESCRIBER/CLIN PHARMACIST DOCUMENTED: ICD-10-PCS | Mod: HCNC,CPTII,S$GLB, | Performed by: FAMILY MEDICINE

## 2023-08-30 PROCEDURE — 1101F PT FALLS ASSESS-DOCD LE1/YR: CPT | Mod: HCNC,CPTII,S$GLB, | Performed by: FAMILY MEDICINE

## 2023-08-30 PROCEDURE — 1159F PR MEDICATION LIST DOCUMENTED IN MEDICAL RECORD: ICD-10-PCS | Mod: HCNC,CPTII,S$GLB, | Performed by: FAMILY MEDICINE

## 2023-08-30 PROCEDURE — 3044F HG A1C LEVEL LT 7.0%: CPT | Mod: HCNC,CPTII,S$GLB, | Performed by: FAMILY MEDICINE

## 2023-08-30 PROCEDURE — 99999 PR PBB SHADOW E&M-EST. PATIENT-LVL IV: ICD-10-PCS | Mod: PBBFAC,HCNC,, | Performed by: FAMILY MEDICINE

## 2023-08-30 PROCEDURE — 3008F PR BODY MASS INDEX (BMI) DOCUMENTED: ICD-10-PCS | Mod: HCNC,CPTII,S$GLB, | Performed by: FAMILY MEDICINE

## 2023-08-30 PROCEDURE — 3078F PR MOST RECENT DIASTOLIC BLOOD PRESSURE < 80 MM HG: ICD-10-PCS | Mod: HCNC,CPTII,S$GLB, | Performed by: FAMILY MEDICINE

## 2023-08-30 PROCEDURE — 3008F BODY MASS INDEX DOCD: CPT | Mod: HCNC,CPTII,S$GLB, | Performed by: FAMILY MEDICINE

## 2023-08-30 PROCEDURE — 1101F PR PT FALLS ASSESS DOC 0-1 FALLS W/OUT INJ PAST YR: ICD-10-PCS | Mod: HCNC,CPTII,S$GLB, | Performed by: FAMILY MEDICINE

## 2023-08-30 PROCEDURE — 3044F PR MOST RECENT HEMOGLOBIN A1C LEVEL <7.0%: ICD-10-PCS | Mod: HCNC,CPTII,S$GLB, | Performed by: FAMILY MEDICINE

## 2023-08-30 PROCEDURE — 3078F DIAST BP <80 MM HG: CPT | Mod: HCNC,CPTII,S$GLB, | Performed by: FAMILY MEDICINE

## 2023-08-30 PROCEDURE — 3074F SYST BP LT 130 MM HG: CPT | Mod: HCNC,CPTII,S$GLB, | Performed by: FAMILY MEDICINE

## 2023-08-30 PROCEDURE — 1159F MED LIST DOCD IN RCRD: CPT | Mod: HCNC,CPTII,S$GLB, | Performed by: FAMILY MEDICINE

## 2023-08-30 PROCEDURE — 3074F PR MOST RECENT SYSTOLIC BLOOD PRESSURE < 130 MM HG: ICD-10-PCS | Mod: HCNC,CPTII,S$GLB, | Performed by: FAMILY MEDICINE

## 2023-08-30 PROCEDURE — 1160F RVW MEDS BY RX/DR IN RCRD: CPT | Mod: HCNC,CPTII,S$GLB, | Performed by: FAMILY MEDICINE

## 2023-08-30 PROCEDURE — 99999 PR PBB SHADOW E&M-EST. PATIENT-LVL IV: CPT | Mod: PBBFAC,HCNC,, | Performed by: FAMILY MEDICINE

## 2023-08-30 PROCEDURE — 99397 PR PREVENTIVE VISIT,EST,65 & OVER: ICD-10-PCS | Mod: HCNC,S$GLB,, | Performed by: FAMILY MEDICINE

## 2023-08-30 PROCEDURE — 99397 PER PM REEVAL EST PAT 65+ YR: CPT | Mod: HCNC,S$GLB,, | Performed by: FAMILY MEDICINE

## 2023-08-30 PROCEDURE — 1125F PR PAIN SEVERITY QUANTIFIED, PAIN PRESENT: ICD-10-PCS | Mod: HCNC,CPTII,S$GLB, | Performed by: FAMILY MEDICINE

## 2023-08-30 PROCEDURE — 1125F AMNT PAIN NOTED PAIN PRSNT: CPT | Mod: HCNC,CPTII,S$GLB, | Performed by: FAMILY MEDICINE

## 2023-08-30 PROCEDURE — 3288F FALL RISK ASSESSMENT DOCD: CPT | Mod: HCNC,CPTII,S$GLB, | Performed by: FAMILY MEDICINE

## 2023-08-30 PROCEDURE — 3288F PR FALLS RISK ASSESSMENT DOCUMENTED: ICD-10-PCS | Mod: HCNC,CPTII,S$GLB, | Performed by: FAMILY MEDICINE

## 2023-08-30 RX ORDER — ROSUVASTATIN CALCIUM 40 MG/1
40 TABLET, COATED ORAL NIGHTLY
Qty: 90 TABLET | Refills: 3 | Status: SHIPPED | OUTPATIENT
Start: 2023-08-30 | End: 2024-08-29

## 2023-08-30 RX ORDER — PANTOPRAZOLE SODIUM 40 MG/1
40 TABLET, DELAYED RELEASE ORAL DAILY
Qty: 90 TABLET | Refills: 3 | Status: SHIPPED | OUTPATIENT
Start: 2023-08-30 | End: 2024-02-12 | Stop reason: SDUPTHER

## 2023-08-30 RX ORDER — ESCITALOPRAM OXALATE 10 MG/1
10 TABLET ORAL DAILY
Qty: 90 TABLET | Refills: 3 | Status: SHIPPED | OUTPATIENT
Start: 2023-08-30 | End: 2024-08-29

## 2023-08-30 NOTE — PROGRESS NOTES
"  Subjective:      Patient ID: Kailey Stokes is a 73 y.o. female.    Chief Complaint: Annual Exam    HPI  72 yo here for annual.  Stable on meds//trying to be consistent with lexapro daily.  Uses klonopin 0.5mg nightly PRN  Completed concealed weapon course/needs form filled out due to meds she is on  No CP/SOB  Bowels moving well after Cscope    Past Medical History:   Diagnosis Date    Abnormal Pap smear     Anxiety     Dr. Bose    Breast disorder     Pain in left breast    Family history of colonic polyps 10/18/2018    Her brother and sister have both had colon polyps.    Hyperlipidemia     Hypertension     Nerve damage     legs    Persistent mood (affective) disorder, unspecified 2/14/2023     Family History   Problem Relation Age of Onset    Diabetes Mother     Glaucoma Mother     Hypertension Father     Heart disease Father     Arthritis Father     Diabetes Sister     Glaucoma Sister     Lymphoma Sister     Diabetes Brother     Breast cancer Maternal Aunt     Cancer Sister     COPD Brother     Kidney disease Brother      Past Surgical History:   Procedure Laterality Date    COLONOSCOPY N/A 10/18/2018    Procedure: COLONOSCOPY;  Surgeon: Jayjay Grier MD;  Location: [facility];  Service: Endoscopy;  Laterality: N/A;    COLONOSCOPY N/A 6/1/2023    Procedure: COLONOSCOPY;  Surgeon: Kel Hilliard MD;  Location: [facility];  Service: Endoscopy;  Laterality: N/A;    HYSTERECTOMY      KNEE SURGERY      TOTAL ABDOMINAL HYSTERECTOMY W/ BILATERAL SALPINGOOPHORECTOMY       Social History     Tobacco Use    Smoking status: Never    Smokeless tobacco: Never   Substance Use Topics    Alcohol use: Not Currently     Comment: occ use    Drug use: No       /60   Pulse 72   Temp 97 °F (36.1 °C) (Tympanic)   Ht 5' 9" (1.753 m)   Wt 86 kg (189 lb 9.5 oz)   SpO2 97%   BMI 28.00 kg/m²     Review of Systems   Constitutional:  Negative for activity change, appetite change, chills, diaphoresis, fatigue, fever and " unexpected weight change.   HENT:  Negative for ear pain, hearing loss, postnasal drip, rhinorrhea and tinnitus.    Eyes:  Negative for visual disturbance.   Respiratory:  Negative for cough, shortness of breath and wheezing.    Cardiovascular:  Negative for chest pain, palpitations and leg swelling.   Gastrointestinal:  Negative for abdominal distention.   Genitourinary:  Negative for dysuria, frequency, hematuria and urgency.   Musculoskeletal:  Negative for back pain and joint swelling.   Neurological:  Negative for weakness and headaches.   Hematological:  Negative for adenopathy.   Psychiatric/Behavioral:  Negative for confusion and decreased concentration.        Objective:     Physical Exam  Vitals and nursing note reviewed.   Constitutional:       General: She is not in acute distress.     Appearance: She is well-developed.   HENT:      Right Ear: External ear normal.      Left Ear: External ear normal.      Nose: Nose normal.   Eyes:      Conjunctiva/sclera: Conjunctivae normal.      Pupils: Pupils are equal, round, and reactive to light.   Neck:      Thyroid: No thyromegaly.   Cardiovascular:      Rate and Rhythm: Normal rate and regular rhythm.      Heart sounds: Normal heart sounds.   Pulmonary:      Effort: Pulmonary effort is normal. No respiratory distress.      Breath sounds: Normal breath sounds. No wheezing or rales.   Abdominal:      General: Bowel sounds are normal. There is no distension.      Palpations: Abdomen is soft.      Tenderness: There is no abdominal tenderness. There is no guarding.   Musculoskeletal:         General: Normal range of motion.      Cervical back: Normal range of motion and neck supple.   Skin:     General: Skin is warm and dry.      Findings: No rash.   Neurological:      Mental Status: She is alert and oriented to person, place, and time.      Cranial Nerves: No cranial nerve deficit.   Psychiatric:         Behavior: Behavior normal.         Thought Content: Thought  content normal.         Judgment: Judgment normal.         Lab Results   Component Value Date    WBC 5.00 08/07/2023    HGB 12.3 08/07/2023    HCT 35.6 (L) 08/07/2023     08/07/2023    CHOL 166 08/07/2023    TRIG 153 (H) 08/07/2023    HDL 48 08/07/2023    ALT 13 08/07/2023    AST 24 08/07/2023     08/07/2023    K 3.8 08/07/2023     08/07/2023    CREATININE 1.1 08/07/2023    BUN 12 08/07/2023    CO2 26 08/07/2023    TSH 2.391 08/07/2023    INR 1.0 09/23/2019    HGBA1C 5.7 (H) 08/07/2023       Assessment:     1. Annual physical exam    2. Hypertension, unspecified type    3. Hyperlipidemia, unspecified hyperlipidemia type    4. Abnormal glucose         Plan:     Annual physical exam    Hypertension, unspecified type    Hyperlipidemia, unspecified hyperlipidemia type  -     rosuvastatin (CRESTOR) 40 MG Tab; Take 1 tablet (40 mg total) by mouth every evening.  Dispense: 90 tablet; Refill: 3    Abnormal glucose    Other orders  -     pantoprazole (PROTONIX) 40 MG tablet; Take 1 tablet (40 mg total) by mouth once daily.  Dispense: 90 tablet; Refill: 3  -     EScitalopram oxalate (LEXAPRO) 10 MG tablet; Take 1 tablet (10 mg total) by mouth once daily.  Dispense: 90 tablet; Refill: 3      Reviewed labs with pt  Cont meds  HRT per Gyn  Form for concealed weapon filled out due to being on lexapro and klonopin  Fu annually and PRN

## 2023-09-05 RX ORDER — CLONAZEPAM 0.5 MG/1
TABLET ORAL
Qty: 30 TABLET | Refills: 0 | Status: SHIPPED | OUTPATIENT
Start: 2023-09-05 | End: 2023-10-26

## 2023-09-05 NOTE — TELEPHONE ENCOUNTER
No care due was identified.  [facility] Embedded Care Due Messages. Reference number: [id number].   9/05/2023 2:31:02 PM CDT

## 2023-10-26 RX ORDER — CLONAZEPAM 0.5 MG/1
TABLET ORAL
Qty: 30 TABLET | Refills: 1 | Status: SHIPPED | OUTPATIENT
Start: 2023-10-26 | End: 2024-02-12 | Stop reason: SDUPTHER

## 2023-10-26 NOTE — TELEPHONE ENCOUNTER
No care due was identified.  [facility] Embedded Care Due Messages. Reference number: [id number].   10/25/2023 8:34:36 PM CDT

## 2023-12-18 ENCOUNTER — OFFICE VISIT (OUTPATIENT)
Dept: INTERNAL MEDICINE | Facility: CLINIC | Age: 73
End: 2023-12-18
Payer: MEDICARE

## 2023-12-18 VITALS
SYSTOLIC BLOOD PRESSURE: 120 MMHG | DIASTOLIC BLOOD PRESSURE: 64 MMHG | TEMPERATURE: 97 F | WEIGHT: 196.63 LBS | BODY MASS INDEX: 29.04 KG/M2 | HEART RATE: 96 BPM

## 2023-12-18 DIAGNOSIS — L98.9 SKIN LESION: ICD-10-CM

## 2023-12-18 DIAGNOSIS — J30.9 ALLERGIC RHINITIS, UNSPECIFIED SEASONALITY, UNSPECIFIED TRIGGER: Primary | ICD-10-CM

## 2023-12-18 PROCEDURE — 3044F PR MOST RECENT HEMOGLOBIN A1C LEVEL <7.0%: ICD-10-PCS | Mod: HCNC,CPTII,S$GLB, | Performed by: NURSE PRACTITIONER

## 2023-12-18 PROCEDURE — 3008F BODY MASS INDEX DOCD: CPT | Mod: HCNC,CPTII,S$GLB, | Performed by: NURSE PRACTITIONER

## 2023-12-18 PROCEDURE — 3044F HG A1C LEVEL LT 7.0%: CPT | Mod: HCNC,CPTII,S$GLB, | Performed by: NURSE PRACTITIONER

## 2023-12-18 PROCEDURE — 1160F RVW MEDS BY RX/DR IN RCRD: CPT | Mod: HCNC,CPTII,S$GLB, | Performed by: NURSE PRACTITIONER

## 2023-12-18 PROCEDURE — 1160F PR REVIEW ALL MEDS BY PRESCRIBER/CLIN PHARMACIST DOCUMENTED: ICD-10-PCS | Mod: HCNC,CPTII,S$GLB, | Performed by: NURSE PRACTITIONER

## 2023-12-18 PROCEDURE — 3008F PR BODY MASS INDEX (BMI) DOCUMENTED: ICD-10-PCS | Mod: HCNC,CPTII,S$GLB, | Performed by: NURSE PRACTITIONER

## 2023-12-18 PROCEDURE — 1159F PR MEDICATION LIST DOCUMENTED IN MEDICAL RECORD: ICD-10-PCS | Mod: HCNC,CPTII,S$GLB, | Performed by: NURSE PRACTITIONER

## 2023-12-18 PROCEDURE — 99999 PR PBB SHADOW E&M-EST. PATIENT-LVL III: CPT | Mod: PBBFAC,HCNC,, | Performed by: NURSE PRACTITIONER

## 2023-12-18 PROCEDURE — 3078F PR MOST RECENT DIASTOLIC BLOOD PRESSURE < 80 MM HG: ICD-10-PCS | Mod: HCNC,CPTII,S$GLB, | Performed by: NURSE PRACTITIONER

## 2023-12-18 PROCEDURE — 1126F AMNT PAIN NOTED NONE PRSNT: CPT | Mod: HCNC,CPTII,S$GLB, | Performed by: NURSE PRACTITIONER

## 2023-12-18 PROCEDURE — 99999 PR PBB SHADOW E&M-EST. PATIENT-LVL III: ICD-10-PCS | Mod: PBBFAC,HCNC,, | Performed by: NURSE PRACTITIONER

## 2023-12-18 PROCEDURE — 3074F PR MOST RECENT SYSTOLIC BLOOD PRESSURE < 130 MM HG: ICD-10-PCS | Mod: HCNC,CPTII,S$GLB, | Performed by: NURSE PRACTITIONER

## 2023-12-18 PROCEDURE — 1159F MED LIST DOCD IN RCRD: CPT | Mod: HCNC,CPTII,S$GLB, | Performed by: NURSE PRACTITIONER

## 2023-12-18 PROCEDURE — 3078F DIAST BP <80 MM HG: CPT | Mod: HCNC,CPTII,S$GLB, | Performed by: NURSE PRACTITIONER

## 2023-12-18 PROCEDURE — 1126F PR PAIN SEVERITY QUANTIFIED, NO PAIN PRESENT: ICD-10-PCS | Mod: HCNC,CPTII,S$GLB, | Performed by: NURSE PRACTITIONER

## 2023-12-18 PROCEDURE — 99213 PR OFFICE/OUTPT VISIT, EST, LEVL III, 20-29 MIN: ICD-10-PCS | Mod: HCNC,S$GLB,, | Performed by: NURSE PRACTITIONER

## 2023-12-18 PROCEDURE — 99213 OFFICE O/P EST LOW 20 MIN: CPT | Mod: HCNC,S$GLB,, | Performed by: NURSE PRACTITIONER

## 2023-12-18 PROCEDURE — 3074F SYST BP LT 130 MM HG: CPT | Mod: HCNC,CPTII,S$GLB, | Performed by: NURSE PRACTITIONER

## 2023-12-18 RX ORDER — MUPIROCIN 20 MG/G
OINTMENT TOPICAL 3 TIMES DAILY
Qty: 30 G | Refills: 0 | Status: SHIPPED | OUTPATIENT
Start: 2023-12-18 | End: 2023-12-28

## 2023-12-18 RX ORDER — FLUTICASONE PROPIONATE 50 MCG
2 SPRAY, SUSPENSION (ML) NASAL DAILY
Qty: 16 G | Refills: 0 | Status: SHIPPED | OUTPATIENT
Start: 2023-12-18 | End: 2024-01-16

## 2023-12-18 NOTE — PROGRESS NOTES
Subjective:       Patient ID: Kailey Stokes is a 73 y.o. female.    Chief Complaint: Rash    Patient here for lesion to top of left thigh  Had an itch, scratched it and noticed a hard white ball on nail from the thigh  Area continued to itch  Now has a rash around it and is dark    Also needs flonase refill    Rash  Pertinent negatives include no cough, fatigue, fever or shortness of breath.       /64   Pulse 96   Temp 97.3 °F (36.3 °C)   Wt 89.2 kg (196 lb 10.4 oz)   BMI 29.04 kg/m²     Review of Systems   Constitutional:  Negative for activity change, appetite change, chills, diaphoresis, fatigue, fever and unexpected weight change.   HENT: Negative.     Respiratory:  Negative for cough and shortness of breath.    Cardiovascular:  Negative for chest pain, palpitations and leg swelling.   Gastrointestinal: Negative.    Genitourinary: Negative.    Musculoskeletal: Negative.    Skin:  Positive for rash. Negative for color change, pallor and wound.   Allergic/Immunologic: Negative for immunocompromised state.   Neurological: Negative.  Negative for dizziness and facial asymmetry.   Hematological:  Negative for adenopathy. Does not bruise/bleed easily.   Psychiatric/Behavioral:  Negative for agitation, behavioral problems and confusion.        Objective:      Physical Exam  Constitutional:       General: She is not in acute distress.     Appearance: Normal appearance. She is well-developed. She is not diaphoretic.   HENT:      Head: Normocephalic and atraumatic.      Right Ear: External ear normal.      Left Ear: External ear normal.   Eyes:      General: No scleral icterus.        Right eye: No discharge.         Left eye: No discharge.      Conjunctiva/sclera: Conjunctivae normal.   Pulmonary:      Effort: Pulmonary effort is normal. No tachypnea, accessory muscle usage or respiratory distress.      Breath sounds: No stridor.   Musculoskeletal:      Cervical back: Normal range of motion and neck supple.    Skin:     Findings: No rash.          Neurological:      Mental Status: She is alert. She is not disoriented.   Psychiatric:         Attention and Perception: She is attentive.         Mood and Affect: Mood normal. Mood is not anxious or depressed. Affect is not labile, blunt, angry or inappropriate.         Speech: Speech normal.         Behavior: Behavior normal.         Thought Content: Thought content normal.         Judgment: Judgment normal.         Assessment:       1. Allergic rhinitis, unspecified seasonality, unspecified trigger    2. Skin lesion        Plan:       Kailey was seen today for rash.    Diagnoses and all orders for this visit:    Allergic rhinitis, unspecified seasonality, unspecified trigger  -     fluticasone propionate (FLONASE) 50 mcg/actuation nasal spray; 2 sprays (100 mcg total) by Each Nostril route once daily.    Skin lesion  -     mupirocin (BACTROBAN) 2 % ointment; Apply topically 3 (three) times daily. for 10 days  Likely folliculitis  Warm compresses  Bactroban ointment tid  Follow up if not improving/worse

## 2024-01-16 DIAGNOSIS — J30.9 ALLERGIC RHINITIS, UNSPECIFIED SEASONALITY, UNSPECIFIED TRIGGER: ICD-10-CM

## 2024-01-16 RX ORDER — FLUTICASONE PROPIONATE 50 MCG
SPRAY, SUSPENSION (ML) NASAL
Qty: 16 G | Refills: 0 | Status: SHIPPED | OUTPATIENT
Start: 2024-01-16 | End: 2024-02-13

## 2024-02-12 ENCOUNTER — TELEPHONE (OUTPATIENT)
Dept: INTERNAL MEDICINE | Facility: CLINIC | Age: 74
End: 2024-02-12
Payer: MEDICARE

## 2024-02-12 NOTE — TELEPHONE ENCOUNTER
----- Message from Vanessa Myles sent at 2/12/2024 10:40 AM CST -----  Type:  Sooner Appointment Request    Caller is requesting a sooner appointment.  Caller declined first available appointment listed below.  Caller will not accept being placed on the waitlist and is requesting a message be sent to doctor.    Name of Caller:  patient  When is the first available appointment?  June  Symptoms:  med refill- est care  Would the patient rather a call back or a response via e27[facility]? call  Best Call Back Number:  [phone] (home)   Additional Information:

## 2024-02-12 NOTE — TELEPHONE ENCOUNTER
Patient states that she is needing refills before her appointment in April. She was a Dr. Flowers's patient

## 2024-02-13 DIAGNOSIS — J30.9 ALLERGIC RHINITIS, UNSPECIFIED SEASONALITY, UNSPECIFIED TRIGGER: ICD-10-CM

## 2024-02-13 RX ORDER — FLUTICASONE PROPIONATE 50 MCG
SPRAY, SUSPENSION (ML) NASAL
Qty: 48 G | Refills: 0 | Status: SHIPPED | OUTPATIENT
Start: 2024-02-13

## 2024-02-15 RX ORDER — PANTOPRAZOLE SODIUM 40 MG/1
40 TABLET, DELAYED RELEASE ORAL DAILY
Qty: 90 TABLET | Refills: 3 | Status: SHIPPED | OUTPATIENT
Start: 2024-02-15

## 2024-02-15 RX ORDER — CLONAZEPAM 0.5 MG/1
0.5 TABLET ORAL DAILY PRN
Qty: 30 TABLET | Refills: 0 | Status: SHIPPED | OUTPATIENT
Start: 2024-02-15

## 2024-04-23 PROBLEM — N64.4 MASTODYNIA OF LEFT BREAST: Status: ACTIVE | Noted: 2024-04-23

## 2024-04-24 ENCOUNTER — OFFICE VISIT (OUTPATIENT)
Dept: SURGERY | Facility: CLINIC | Age: 74
End: 2024-04-24
Payer: MEDICARE

## 2024-04-24 VITALS — HEIGHT: 69 IN | BODY MASS INDEX: 28.14 KG/M2 | WEIGHT: 190 LBS

## 2024-04-24 DIAGNOSIS — N64.4 MASTODYNIA OF LEFT BREAST: Primary | ICD-10-CM

## 2024-04-24 DIAGNOSIS — N64.4 BREAST PAIN, LEFT: ICD-10-CM

## 2024-04-24 PROCEDURE — 3044F HG A1C LEVEL LT 7.0%: CPT | Mod: CPTII,S$GLB,, | Performed by: NURSE PRACTITIONER

## 2024-04-24 PROCEDURE — 1159F MED LIST DOCD IN RCRD: CPT | Mod: CPTII,S$GLB,, | Performed by: NURSE PRACTITIONER

## 2024-04-24 PROCEDURE — 1160F RVW MEDS BY RX/DR IN RCRD: CPT | Mod: CPTII,S$GLB,, | Performed by: NURSE PRACTITIONER

## 2024-04-24 PROCEDURE — 1126F AMNT PAIN NOTED NONE PRSNT: CPT | Mod: CPTII,S$GLB,, | Performed by: NURSE PRACTITIONER

## 2024-04-24 PROCEDURE — 99999 PR PBB SHADOW E&M-EST. PATIENT-LVL III: CPT | Mod: PBBFAC,,, | Performed by: NURSE PRACTITIONER

## 2024-04-24 PROCEDURE — 3008F BODY MASS INDEX DOCD: CPT | Mod: CPTII,S$GLB,, | Performed by: NURSE PRACTITIONER

## 2024-04-24 PROCEDURE — 99213 OFFICE O/P EST LOW 20 MIN: CPT | Mod: S$GLB,,, | Performed by: NURSE PRACTITIONER

## 2024-04-24 NOTE — PROGRESS NOTES
[facility]  MD Rivas Joe, NP-C    Date of Service: 4/24/2024    Chief Complaint:   Kailey Stokes is a 73 y.o. female presenting today for left breast pain.  Her imaging has been normal.  She reports no interval changes on her self-breast examination.     History of Present Illness:   Mrs. Kailey Stokes  presents on 04/24/2024 due to left breast pain.  Her imaging has been normal.  MD:::Katherin Ames MD    Past Medical History:   Diagnosis Date    Abnormal Pap smear     Anxiety     Dr. Bose    Breast disorder     Pain in left breast    Family history of colonic polyps 10/18/2018    Her brother and sister have both had colon polyps.    Hyperlipidemia     Hypertension     Mastodynia of left breast 04/23/2024    Nerve damage     legs    Persistent mood (affective) disorder, unspecified 02/14/2023    Stage 3 chronic kidney disease, unspecified whether stage 3a or 3b CKD       Past Surgical History:   Procedure Laterality Date    COLONOSCOPY N/A 10/18/2018    Procedure: COLONOSCOPY;  Surgeon: Jayjay Grier MD;  Location: [facility];  Service: Endoscopy;  Laterality: N/A;    COLONOSCOPY N/A 06/01/2023    Procedure: COLONOSCOPY;  Surgeon: Kel Hilliard MD;  Location: [facility];  Service: Endoscopy;  Laterality: N/A;    HYSTERECTOMY      KNEE SURGERY Left     shattered patella repair    TOTAL ABDOMINAL HYSTERECTOMY W/ BILATERAL SALPINGOOPHORECTOMY          Current Outpatient Medications:     acetaminophen (TYLENOL) 500 mg Cap, Take by mouth., Disp: , Rfl:     ascorbic acid, vitamin C, (VITAMIN C) 1000 MG tablet, Take 1,000 mg by mouth., Disp: , Rfl:     aspirin (ECOTRIN) 81 MG EC tablet, Take 81 mg by mouth once daily., Disp: , Rfl:     cholecalciferol, vitamin D3, 125 mcg (5,000 unit) capsule, Take 5,000 Units by mouth., Disp: , Rfl:     clonazePAM (KLONOPIN) 0.5 MG tablet, Take 1 tablet (0.5 mg total) by mouth daily as needed for Anxiety., Disp:  30 tablet, Rfl: 0    estradioL (ESTRACE) 0.5 MG tablet, Take 1 tablet (0.5 mg total) by mouth once daily., Disp: 30 tablet, Rfl: 4    fluticasone propionate (FLONASE) 50 mcg/actuation nasal spray, SHAKE LIQUID AND USE 2 SPRAYS(100 MCG) IN EACH NOSTRIL EVERY DAY, Disp: 48 g, Rfl: 0    Lactobac no.41/Bifidobact no.7 (PROBIOTIC-10 ORAL), Take by mouth. [place] of life, Disp: , Rfl:     mometasone (ELOCON) 0.1 % solution, Apply to scalp once daily, Disp: 60 mL, Rfl: 3    pantoprazole (PROTONIX) 40 MG tablet, Take 1 tablet (40 mg total) by mouth once daily., Disp: 90 tablet, Rfl: 3    rosuvastatin (CRESTOR) 40 MG Tab, Take 1 tablet (40 mg total) by mouth every evening., Disp: 90 tablet, Rfl: 3    thiamine HCl (VITAMIN B-1) 500 MG tablet, Take 500 mg by mouth once daily., Disp: , Rfl:     tolterodine (DETROL LA) 2 MG Cp24, Take 1 capsule (2 mg total) by mouth once daily., Disp: 30 capsule, Rfl: 11    EScitalopram oxalate (LEXAPRO) 10 MG tablet, Take 1 tablet (10 mg total) by mouth once daily., Disp: 90 tablet, Rfl: 3    fluocinolone (DERMA-SMOOTHE) 0.01 % external oil, Apply oil to scalp once a day, Disp: 1 Bottle, Rfl: 5   Review of patient's allergies indicates:   Allergen Reactions    Bromocriptine      Other reaction(s): nightmares  Other reaction(s): anixety  Other reaction(s): nightmares  Other reaction(s): anixety  Other reaction(s): nightmares  Other reaction(s): anixety    Codeine      Other reaction(s): Headache    Nitroglycerin Rash      Social History     Tobacco Use    Smoking status: Never    Smokeless tobacco: Never   Substance Use Topics    Alcohol use: Not Currently     Comment: occ use      Family History   Problem Relation Name Age of Onset    Diabetes Mother Ada Ariadna     Glaucoma Mother Ada Ariadna     Hypertension Father Crispin Ariadna     Heart disease Father Crispin Ariadna     Arthritis Father Crispin Ariadna     Diabetes Sister Jeanne ariadna     Glaucoma Sister Jeanne ariadna     Lymphoma  Sister Jeanne rosenthal     Cancer Sister Karlene Rosenthal     Diabetes Brother Neel Rosenthal     COPD Brother Andrea Rosenthal     Kidney disease Brother Naeem Rosenthal     Breast cancer Maternal Aunt Juvenal Sifuentes 60 - 69    Ovarian cancer Neg Hx          Review of Systems   Integumentary:  Positive for breast tenderness. Negative for color change, rash, mole/lesion, breast mass and breast discharge.   Breast: Positive for tenderness.Negative for mass       Physical Exam   Constitutional: She appears well-developed. She is cooperative.   HENT:   Head: Normocephalic.   Cardiovascular:  Normal rate and regular rhythm.            Pulmonary/Chest: She exhibits no tenderness and no bony tenderness. Right breast exhibits no mass, no nipple discharge, no skin change and no tenderness. Left breast exhibits no mass, no nipple discharge, no skin change and no tenderness.       Abdominal: Soft. Normal appearance.   Musculoskeletal: Lymphadenopathy:      Upper Body:      Right upper body: No supraclavicular or axillary adenopathy.      Left upper body: No supraclavicular or axillary adenopathy.     Neurological: She is alert.   Skin: No rash noted.        Mammogram: Screening 2/16/2024- The breasts are heterogeneously dense, which may obscure small masses. There is no evidence of suspicious masses, microcalcifications or architectural distortion. No mammographic evidence of malignancy.     Assessment and Plan:    1. Mastodynia of left breast  Assessment & Plan:  We discussed our fibrocystic mastopathy protocol in detail. She knows that if she follows this protocol - that her symptoms should improve.  We discussed how breast pain is usually not associated with breast cancer, however, pain can be the presenting symptom with some cancers (but this could be coincidental). Still, if her pain does not improve in 8-12 weeks she should call us back for additional recommendations.        2. Breast pain, left  -     Ambulatory  referral/consult to Breast Surgery         Medical Decision Making:  It is my impression that this patient suffers all conditions contained in this medical document.  Each of these conditions did affect our plan of care and my medical decision making today.  It is my opinion that the medical decision making concerning this patient was of moderate difficulty based on the aforementioned conditions.  Any further recommendations will be communicated to the patient by me.  I have reviewed and verified her allergies, list of medications, medical and surgical histories, social history, and a pertinent review of symptoms.      Follow up:  6 months and prn    For:  Physical Examination

## 2024-05-14 ENCOUNTER — PATIENT MESSAGE (OUTPATIENT)
Dept: ADMINISTRATIVE | Facility: CLINIC | Age: 74
End: 2024-05-14
Payer: MEDICARE

## 2024-05-15 ENCOUNTER — TELEPHONE (OUTPATIENT)
Dept: ADMINISTRATIVE | Facility: CLINIC | Age: 74
End: 2024-05-15
Payer: MEDICARE

## 2024-05-15 NOTE — TELEPHONE ENCOUNTER
Called pt; informed pt I was calling to confirm her virtual EAWV on 5/17/24 at 7:00am and to see if she needed any help; pt stated she did not need any help and would complete e-pre check later today; pt informed to login 10 minutes prior to appt time

## 2024-05-17 ENCOUNTER — TELEPHONE (OUTPATIENT)
Dept: FAMILY MEDICINE | Facility: CLINIC | Age: 74
End: 2024-05-17

## 2024-05-17 ENCOUNTER — PATIENT MESSAGE (OUTPATIENT)
Dept: FAMILY MEDICINE | Facility: CLINIC | Age: 74
End: 2024-05-17

## 2024-05-17 ENCOUNTER — OFFICE VISIT (OUTPATIENT)
Dept: FAMILY MEDICINE | Facility: CLINIC | Age: 74
End: 2024-05-17
Payer: MEDICARE

## 2024-05-17 VITALS
WEIGHT: 190.06 LBS | DIASTOLIC BLOOD PRESSURE: 80 MMHG | SYSTOLIC BLOOD PRESSURE: 112 MMHG | HEIGHT: 69 IN | BODY MASS INDEX: 28.15 KG/M2

## 2024-05-17 DIAGNOSIS — F34.9 PERSISTENT MOOD (AFFECTIVE) DISORDER, UNSPECIFIED: ICD-10-CM

## 2024-05-17 DIAGNOSIS — N18.32 STAGE 3B CHRONIC KIDNEY DISEASE: ICD-10-CM

## 2024-05-17 DIAGNOSIS — Z00.00 ENCOUNTER FOR PREVENTIVE HEALTH EXAMINATION: Primary | ICD-10-CM

## 2024-05-17 DIAGNOSIS — I70.0 AORTIC CALCIFICATION: ICD-10-CM

## 2024-05-17 DIAGNOSIS — H91.90 DECREASED HEARING, UNSPECIFIED LATERALITY: ICD-10-CM

## 2024-05-17 DIAGNOSIS — E51.9 THIAMINE DEFICIENCY: ICD-10-CM

## 2024-05-17 DIAGNOSIS — I10 HYPERTENSION, UNSPECIFIED TYPE: ICD-10-CM

## 2024-05-17 DIAGNOSIS — E89.41 SYMPTOMATIC STATES ASSOCIATED WITH ARTIFICIAL MENOPAUSE: ICD-10-CM

## 2024-05-17 DIAGNOSIS — E78.5 HYPERLIPIDEMIA, UNSPECIFIED HYPERLIPIDEMIA TYPE: ICD-10-CM

## 2024-05-17 DIAGNOSIS — K21.9 GASTROESOPHAGEAL REFLUX DISEASE, UNSPECIFIED WHETHER ESOPHAGITIS PRESENT: ICD-10-CM

## 2024-05-17 DIAGNOSIS — R26.9 ABNORMALITY OF GAIT AND MOBILITY: ICD-10-CM

## 2024-05-17 PROCEDURE — 1158F ADVNC CARE PLAN TLK DOCD: CPT | Mod: CPTII,95,, | Performed by: NURSE PRACTITIONER

## 2024-05-17 PROCEDURE — 1125F AMNT PAIN NOTED PAIN PRSNT: CPT | Mod: CPTII,95,, | Performed by: NURSE PRACTITIONER

## 2024-05-17 PROCEDURE — 1170F FXNL STATUS ASSESSED: CPT | Mod: CPTII,95,, | Performed by: NURSE PRACTITIONER

## 2024-05-17 PROCEDURE — 1101F PT FALLS ASSESS-DOCD LE1/YR: CPT | Mod: CPTII,95,, | Performed by: NURSE PRACTITIONER

## 2024-05-17 PROCEDURE — G0439 PPPS, SUBSEQ VISIT: HCPCS | Mod: 95,,, | Performed by: NURSE PRACTITIONER

## 2024-05-17 PROCEDURE — 1160F RVW MEDS BY RX/DR IN RCRD: CPT | Mod: CPTII,95,, | Performed by: NURSE PRACTITIONER

## 2024-05-17 PROCEDURE — 3288F FALL RISK ASSESSMENT DOCD: CPT | Mod: CPTII,95,, | Performed by: NURSE PRACTITIONER

## 2024-05-17 PROCEDURE — 3044F HG A1C LEVEL LT 7.0%: CPT | Mod: CPTII,95,, | Performed by: NURSE PRACTITIONER

## 2024-05-17 PROCEDURE — 3079F DIAST BP 80-89 MM HG: CPT | Mod: CPTII,95,, | Performed by: NURSE PRACTITIONER

## 2024-05-17 PROCEDURE — 1159F MED LIST DOCD IN RCRD: CPT | Mod: CPTII,95,, | Performed by: NURSE PRACTITIONER

## 2024-05-17 PROCEDURE — 3074F SYST BP LT 130 MM HG: CPT | Mod: CPTII,95,, | Performed by: NURSE PRACTITIONER

## 2024-05-17 RX ORDER — DICLOFENAC SODIUM 10 MG/G
GEL TOPICAL
COMMUNITY
Start: 2024-05-10

## 2024-05-17 NOTE — PROGRESS NOTES
"The patient location is: [place]  The chief complaint leading to consultation is:  Medicare AWV    Visit type: audiovisual    Face to Face time with patient:  Medicare AWV  30 minutes of total time spent on the encounter, which includes face to face time and non-face to face time preparing to see the patient (eg, review of tests), Obtaining and/or reviewing separately obtained history, Documenting clinical information in the electronic or other health record, Independently interpreting results (not separately reported) and communicating results to the patient/family/caregiver, or Care coordination (not separately reported).         Each patient to whom he or she provides medical services by telemedicine is:  (1) informed of the relationship between the physician and patient and the respective role of any other health care provider with respect to management of the patient; and (2) notified that he or she may decline to receive medical services by telemedicine and may withdraw from such care at any time.    Notes:       Kailey Stokes presented for a  Medicare AWV and comprehensive Health Risk Assessment today. The following components were reviewed and updated:    Medical history  Family History  Social history  Allergies and Current Medications  Health Risk Assessment  Health Maintenance  Care Team         ** See Completed Assessments for Annual Wellness Visit within the encounter summary.**         The following assessments were completed:  Living Situation  CAGE  Depression Screening  Fall Risk Assessment (MACH 10)  Hearing Assessment(HHI)  Cognitive Function Screening  Nutrition Screening  ADL Screening  PAQ Screening    Opioid documentation:      Patient does not have a current opioid prescription.        Vitals:    05/17/24 0702   BP: 112/80   Weight: 86.2 kg (190 lb 0.6 oz)   Height: 5' 9" (1.753 m)     Body mass index is 28.06 kg/m².  Physical Exam  Constitutional:       General: She is not in acute " distress.     Appearance: Normal appearance. She is not ill-appearing, toxic-appearing or diaphoretic.   Pulmonary:      Effort: Pulmonary effort is normal.   Neurological:      Mental Status: She is alert and oriented to person, place, and time.   Psychiatric:         Mood and Affect: Mood normal.         Behavior: Behavior normal.               Diagnoses and health risks identified today and associated recommendations/orders:    1. Encounter for preventive health examination  Screenings performed, as noted above.  Personal preventative testing needs reviewed.      2. Stage 3b chronic kidney disease  Monitored, stable, following up with nephrology, off of BP meds at this time    3. Abnormality of gait and mobility  Monitored, stable, no recent falls, stay active    4. Aortic calcification  Monitored, stable, on a statin, cont tx    5. Hyperlipidemia, unspecified hyperlipidemia type  Monitored, stable, on a statin, cont tx    6. Hypertension, unspecified type  Monitored, stable, not on meds at this time    7. Persistent mood (affective) disorder, unspecified  Treated with Klonopin prn, stable, cont tx     8. Thiamine deficiency  Treated with supplements, stable, cont tx    9. Gastroesophageal reflux disease, unspecified whether esophagitis present  Treated with protinix, stable, cont tx    10. Symptomatic states associated with artificial menopause  Treated with estrogen, stable, cont tx    11. Decreased hearing, unspecified laterality  Assessed, unstable, will have her hearing checked    Discussed vaccines      Provided Kailey with a 5-10 year written screening schedule and personal prevention plan. Recommendations were developed using the USPSTF age appropriate recommendations. Education, counseling, and referrals were provided as needed. After Visit Summary printed and given to patient which includes a list of additional screenings\tests needed.    No follow-ups on file.    Amilcar Mott NP  I offered to  discuss advanced care planning, including how to pick a person who would make decisions for you if you were unable to make them for yourself, called a health care power of , and what kind of decisions you might make such as use of life sustaining treatments such as ventilators and tube feeding when faced with a life limiting illness recorded on a living will that they will need to know. (How you want to be cared for as you near the end of your natural life)     X Patient is interested in learning more about how to make advanced directives.  I provided them paperwork and offered to discuss this with them.

## 2024-05-17 NOTE — PATIENT INSTRUCTIONS
Counseling and Referral of Other Preventative  (Italic type indicates deductible and co-insurance are waived)    Patient Name: Kailey Stokes  Today's Date: 5/17/2024    Health Maintenance       Date Due Completion Date    RSV Vaccine (Age 60+ and Pregnant patients) (1 - 1-dose 60+ series) Never done ---    Annual UACr 02/23/2023 2/23/2022    COVID-19 Vaccine (12 - 2023-24 season) 09/01/2023 11/9/2022    Lipid Panel 08/07/2024 8/7/2023    Mammogram 02/16/2025 2/16/2024    High Dose Statin 04/24/2025 4/24/2024    TETANUS VACCINE 01/24/2027 1/24/2017    Colorectal Cancer Screening 06/01/2028 6/1/2023    DEXA Scan 02/16/2029 2/16/2024        No orders of the defined types were placed in this encounter.    The following information is provided to all patients.  This information is to help you find resources for any of the problems found today that may be affecting your health:                  Living healthy guide: www.[facility].[place].gov      Understanding Diabetes: www.diabetes.org      Eating healthy: www.cdc.gov/healthyweight      CDC home safety checklist: www.cdc.gov/steadi/patient.html      Agency on Aging: www.goea.[place].gov      Alcoholics anonymous (AA): www.aa.org      Physical Activity: www.katy.nih.gov/cs0mhel      Tobacco use: www.quitwithusla.org

## 2024-05-17 NOTE — TELEPHONE ENCOUNTER
----- Message from Amilcar Mott NP sent at 5/17/2024  7:31 AM CDT -----  Regarding: appt  Please schedule an appt with audiology to have her hearing checked  she lives in [place]   send through her portal.  thanks

## 2025-03-12 ENCOUNTER — TELEPHONE (OUTPATIENT)
Dept: ADMINISTRATIVE | Facility: CLINIC | Age: 75
End: 2025-03-12
Payer: MEDICARE

## 2025-03-12 ENCOUNTER — OFFICE VISIT (OUTPATIENT)
Dept: INTERNAL MEDICINE | Facility: CLINIC | Age: 75
End: 2025-03-12
Payer: MEDICARE

## 2025-03-12 VITALS
SYSTOLIC BLOOD PRESSURE: 117 MMHG | HEIGHT: 69 IN | BODY MASS INDEX: 28.08 KG/M2 | WEIGHT: 189.63 LBS | DIASTOLIC BLOOD PRESSURE: 80 MMHG

## 2025-03-12 DIAGNOSIS — H91.90 HOH (HARD OF HEARING): ICD-10-CM

## 2025-03-12 DIAGNOSIS — K21.9 GASTROESOPHAGEAL REFLUX DISEASE, UNSPECIFIED WHETHER ESOPHAGITIS PRESENT: ICD-10-CM

## 2025-03-12 DIAGNOSIS — E78.5 HYPERLIPIDEMIA, UNSPECIFIED HYPERLIPIDEMIA TYPE: ICD-10-CM

## 2025-03-12 DIAGNOSIS — E89.41 SYMPTOMATIC STATES ASSOCIATED WITH ARTIFICIAL MENOPAUSE: ICD-10-CM

## 2025-03-12 DIAGNOSIS — F41.9 ANXIETY: ICD-10-CM

## 2025-03-12 DIAGNOSIS — F34.9 PERSISTENT MOOD (AFFECTIVE) DISORDER, UNSPECIFIED: ICD-10-CM

## 2025-03-12 DIAGNOSIS — I10 HYPERTENSION, UNSPECIFIED TYPE: ICD-10-CM

## 2025-03-12 DIAGNOSIS — I70.0 AORTIC CALCIFICATION: ICD-10-CM

## 2025-03-12 DIAGNOSIS — Z00.00 ENCOUNTER FOR PREVENTIVE HEALTH EXAMINATION: Primary | ICD-10-CM

## 2025-03-12 DIAGNOSIS — E51.9 THIAMINE DEFICIENCY: ICD-10-CM

## 2025-03-12 DIAGNOSIS — R26.9 ABNORMALITY OF GAIT AND MOBILITY: ICD-10-CM

## 2025-03-12 PROBLEM — N18.32 STAGE 3B CHRONIC KIDNEY DISEASE: Status: RESOLVED | Noted: 2022-11-02 | Resolved: 2025-03-12

## 2025-03-12 NOTE — PATIENT INSTRUCTIONS
Counseling and Referral of Other Preventative  (Italic type indicates deductible and co-insurance are waived)    Patient Name: Kailey Stokes  Today's Date: 3/12/2025    Health Maintenance       Date Due Completion Date    High Dose Statin Never done ---    RSV Vaccine (Age 60+ and Pregnant patients) (1 - Risk 60-74 years 1-dose series) Never done ---    Annual UACr 02/23/2023 2/23/2022    COVID-19 Vaccine (12 - 2024-25 season) 09/01/2024 11/9/2022    Mammogram 02/16/2025 2/16/2024    Lipid Panel 08/02/2025 8/2/2024    TETANUS VACCINE 01/24/2027 1/24/2017    Colorectal Cancer Screening 06/01/2028 6/1/2023    DEXA Scan 02/16/2029 2/16/2024        No orders of the defined types were placed in this encounter.    The following information is provided to all patients.  This information is to help you find resources for any of the problems found today that may be affecting your health:                  Living healthy guide: www.[facility].[place].gov      Understanding Diabetes: www.diabetes.org      Eating healthy: www.cdc.gov/healthyweight      CDC home safety checklist: www.cdc.gov/steadi/patient.html      Agency on Aging: www.goea.[place].gov      Alcoholics anonymous (AA): www.aa.org      Physical Activity: www.katy.nih.gov/jq1jbrm      Tobacco use: www.quitwithusla.org

## 2025-03-12 NOTE — TELEPHONE ENCOUNTER
Called pt; informed pt I was just making a reminder call for pt's virtual visit today at 8:00am and to see if pt needed any help; pt stated didn't need any help and would complete the e-pre check soon; pt informed to login 10 minutes prior to appt time

## 2025-03-12 NOTE — PROGRESS NOTES
The patient location is: [place]  The chief complaint leading to consultation is:  Medicare AWV    Visit type: audiovisual    Face to Face time with patient:  25 min  55  minutes of total time spent on the encounter, which includes face to face time and non-face to face time preparing to see the patient (eg, review of tests), Obtaining and/or reviewing separately obtained history, Documenting clinical information in the electronic or other health record, Independently interpreting results (not separately reported) and communicating results to the patient/family/caregiver, or Care coordination (not separately reported).         Each patient to whom he or she provides medical services by telemedicine is:  (1) informed of the relationship between the physician and patient and the respective role of any other health care provider with respect to management of the patient; and (2) notified that he or she may decline to receive medical services by telemedicine and may withdraw from such care at any time.    Notes:                         Kailey Stokes presented for a  Medicare AWV and comprehensive Health Risk Assessment today. The following components were reviewed and updated:    Medical history  Family History  Social history  Allergies and Current Medications  Health Risk Assessment  Health Maintenance  Care Team         ** See Completed Assessments for Annual Wellness Visit within the encounter summary.**         The following assessments were completed:  Living Situation  CAGE  Depression Screening  Fall Risk Assessment (MACH 10)  Hearing Assessment(HHI)  Cognitive Function Screening  Nutrition Screening  ADL Screening  PAQ Screening  Has urine leakage ever interrupted your daily activites or sleep? No  Do you think you could use some help to better manage urine leakage?No     Opioid documentation:      Patient does not have a current opioid prescription.        Vitals:    03/12/25 0753   BP: 117/80   Weight: 86 kg  "(189 lb 9.5 oz)   Height: 5' 9" (1.753 m)     Body mass index is 28 kg/m².  Physical Exam  Constitutional:       General: She is not in acute distress.     Appearance: Normal appearance. She is not ill-appearing, toxic-appearing or diaphoretic.   Pulmonary:      Effort: Pulmonary effort is normal.   Neurological:      Mental Status: She is alert and oriented to person, place, and time.   Psychiatric:         Mood and Affect: Mood normal.         Behavior: Behavior normal.               Diagnoses and health risks identified today and associated recommendations/orders:    1. Encounter for preventive health examination  Screenings performed, as noted above.  Personal preventative testing needs reviewed.      2. Abnormality of gait and mobility  Monitored, stable, hx of fx knee, has pain at times, takes Tylenol prn, not exercising due to pain, discussed aquatic exercising    3. Thiamine deficiency  Treated with supplements, stable, cont tx    4. Gastroesophageal reflux disease, unspecified whether esophagitis present  Treated with protonix, stable, cont tx    5. Symptomatic states associated with artificial menopause  Treated with estrogen, stable, cont tx    6. Aortic calcification  Monitored, stable, on a statin, cont tx    7. Hyperlipidemia, unspecified hyperlipidemia type  Treated with crestor, stable, cont tx    8. Hypertension, unspecified type  Monitored, stable, sees nephrology    9. Anxiety  Treated with Klonipin prn, stable, cont tx    10. [place] (hard of hearing)  Assessed, states is scheduled for a hearing test soon    States she is having mammogram at [facility], drinking lots of fluids and seeing nephrology, no NSAIDs      Provided Kailey with a 5-10 year written screening schedule and personal prevention plan. Recommendations were developed using the USPSTF age appropriate recommendations. Education, counseling, and referrals were provided as needed. After Visit Summary printed and given to patient which " includes a list of additional screenings\tests needed.    No follow-ups on file.    Amilcar Mott, NP  I offered to discuss advanced care planning, including how to pick a person who would make decisions for you if you were unable to make them for yourself, called a health care power of , and what kind of decisions you might make such as use of life sustaining treatments such as ventilators and tube feeding when faced with a life limiting illness recorded on a living will that they will need to know. (How you want to be cared for as you near the end of your natural life)     X Patient is interested in learning more about how to make advanced directives.  I provided them paperwork and offered to discuss this with them.

## 2025-04-02 ENCOUNTER — TELEPHONE (OUTPATIENT)
Dept: OPHTHALMOLOGY | Facility: CLINIC | Age: 75
End: 2025-04-02
Payer: MEDICARE

## 2025-04-02 NOTE — TELEPHONE ENCOUNTER
----- Message from Kadie sent at 4/2/2025 10:49 AM CDT -----  Type:  Sooner Apoointment RequestCaller is requesting a sooner appointment.  Caller declined first available appointment listed below.  Caller will not accept being placed on the waitlist and is requesting a message be sent to doctor.Name of Caller:Nataliia is the first available appointment?n/aSymptoms:Routine/ left eye problems seeingWould the patient rather a call back or a response via [facility]? callbackBe Call Back Number:[phone] Information: Need to schedule appt. Please call patient she has questions concerning location

## 2025-05-13 ENCOUNTER — OFFICE VISIT (OUTPATIENT)
Dept: OPHTHALMOLOGY | Facility: CLINIC | Age: 75
End: 2025-05-13
Payer: MEDICARE

## 2025-05-13 DIAGNOSIS — H52.03 HYPEROPIA WITH PRESBYOPIA OF BOTH EYES: ICD-10-CM

## 2025-05-13 DIAGNOSIS — H52.4 HYPEROPIA WITH PRESBYOPIA OF BOTH EYES: ICD-10-CM

## 2025-05-13 DIAGNOSIS — H25.813 COMBINED FORM OF AGE-RELATED CATARACT, BOTH EYES: Primary | ICD-10-CM

## 2025-05-13 DIAGNOSIS — H43.813 POSTERIOR VITREOUS DETACHMENT OF BOTH EYES: ICD-10-CM

## 2025-05-13 PROCEDURE — 1159F MED LIST DOCD IN RCRD: CPT | Mod: CPTII,S$GLB,, | Performed by: OPTOMETRIST

## 2025-05-13 PROCEDURE — 92014 COMPRE OPH EXAM EST PT 1/>: CPT | Mod: S$GLB,,, | Performed by: OPTOMETRIST

## 2025-05-13 PROCEDURE — 99999 PR PBB SHADOW E&M-EST. PATIENT-LVL I: CPT | Mod: PBBFAC,,, | Performed by: OPTOMETRIST

## 2025-05-13 PROCEDURE — 1160F RVW MEDS BY RX/DR IN RCRD: CPT | Mod: CPTII,S$GLB,, | Performed by: OPTOMETRIST

## 2025-05-13 PROCEDURE — 3044F HG A1C LEVEL LT 7.0%: CPT | Mod: CPTII,S$GLB,, | Performed by: OPTOMETRIST

## 2025-05-13 PROCEDURE — 92015 DETERMINE REFRACTIVE STATE: CPT | Mod: S$GLB,,, | Performed by: OPTOMETRIST

## 2025-05-13 NOTE — PROGRESS NOTES
HPI     Annual Exam            Comments: Pt reports for routine eye exam.  Pt states that her VA has been stable since her last eye exam but would   like have her cataracts examined again this year. Pt is wearing PAL full   time and is using OTC Pataday eyedrops OU PRN for allergies.   Pt denies any eye pain and or irritation.           Last edited by Robbin Bates, OD on 5/13/2025  1:36 PM.            Assessment /Plan     For exam results, see Encounter Report.    1. Combined form of age-related cataract, both eyes  Cataracts are not visually significant and not affecting activities of daily living. Annual observation is recommended at this time. Patient to call or return to clinic with any significant change in vision prior to next visit.    2. Posterior vitreous detachment of both eyes  The nature of posterior vitreous detachment was discussed with the patient.  Signs and symptoms of retinal detachment were discussed with patient.   No holes or tears were noted upon careful retinal examination after dilation today.   Return to clinic as soon as possible (same day) if you notice any new floaters, flashes of light, curtain/veil over your vision from any direction, or any change in vision.    3. Hyperopia with presbyopia of both eyes  Eyeglass Final Rx       Eyeglass Final Rx         Sphere Cylinder Axis Add    Right +1.00 DS  +2.50    Left +0.50 +0.75 049 +2.50      Type: PAL    Expiration Date: 5/13/2026                      RTC 1 yr for dilated eye exam or sooner if any changes to vision.   Discussed above and answered questions.